# Patient Record
Sex: FEMALE | Race: WHITE | NOT HISPANIC OR LATINO | Employment: OTHER | URBAN - NONMETROPOLITAN AREA
[De-identification: names, ages, dates, MRNs, and addresses within clinical notes are randomized per-mention and may not be internally consistent; named-entity substitution may affect disease eponyms.]

---

## 2018-02-15 ENCOUNTER — OFFICE VISIT CONVERTED (OUTPATIENT)
Dept: FAMILY MEDICINE CLINIC | Facility: CLINIC | Age: 58
End: 2018-02-15
Attending: NURSE PRACTITIONER

## 2018-03-19 ENCOUNTER — OFFICE VISIT CONVERTED (OUTPATIENT)
Dept: ORTHOPEDIC SURGERY | Facility: CLINIC | Age: 58
End: 2018-03-19
Attending: ORTHOPAEDIC SURGERY

## 2018-04-02 ENCOUNTER — CONVERSION ENCOUNTER (OUTPATIENT)
Dept: GENERAL RADIOLOGY | Facility: HOSPITAL | Age: 58
End: 2018-04-02

## 2019-03-04 ENCOUNTER — HOSPITAL ENCOUNTER (OUTPATIENT)
Dept: ULTRASOUND IMAGING | Facility: HOSPITAL | Age: 59
Discharge: HOME | End: 2019-03-04
Attending: SURGERY

## 2019-03-11 ENCOUNTER — OFFICE VISIT CONVERTED (OUTPATIENT)
Dept: FAMILY MEDICINE CLINIC | Facility: CLINIC | Age: 59
End: 2019-03-11
Attending: FAMILY MEDICINE

## 2019-03-26 ENCOUNTER — OFFICE VISIT CONVERTED (OUTPATIENT)
Dept: FAMILY MEDICINE CLINIC | Facility: CLINIC | Age: 59
End: 2019-03-26
Attending: FAMILY MEDICINE

## 2019-03-26 ENCOUNTER — CONVERSION ENCOUNTER (OUTPATIENT)
Dept: FAMILY MEDICINE CLINIC | Facility: CLINIC | Age: 59
End: 2019-03-26

## 2019-04-15 ENCOUNTER — OFFICE VISIT CONVERTED (OUTPATIENT)
Dept: FAMILY MEDICINE CLINIC | Facility: CLINIC | Age: 59
End: 2019-04-15
Attending: FAMILY MEDICINE

## 2019-05-09 ENCOUNTER — CONVERSION ENCOUNTER (OUTPATIENT)
Dept: FAMILY MEDICINE CLINIC | Facility: CLINIC | Age: 59
End: 2019-05-09

## 2019-05-09 ENCOUNTER — OFFICE VISIT CONVERTED (OUTPATIENT)
Dept: FAMILY MEDICINE CLINIC | Facility: CLINIC | Age: 59
End: 2019-05-09
Attending: FAMILY MEDICINE

## 2019-06-04 ENCOUNTER — OFFICE VISIT CONVERTED (OUTPATIENT)
Dept: FAMILY MEDICINE CLINIC | Facility: CLINIC | Age: 59
End: 2019-06-04
Attending: FAMILY MEDICINE

## 2019-06-06 ENCOUNTER — HOSPITAL ENCOUNTER (OUTPATIENT)
Dept: URGENT CARE | Facility: CLINIC | Age: 59
Discharge: HOME | End: 2019-06-06
Attending: PHYSICIAN ASSISTANT

## 2019-06-10 ENCOUNTER — OFFICE VISIT CONVERTED (OUTPATIENT)
Dept: FAMILY MEDICINE CLINIC | Facility: CLINIC | Age: 59
End: 2019-06-10
Attending: FAMILY MEDICINE

## 2019-07-02 ENCOUNTER — OFFICE VISIT CONVERTED (OUTPATIENT)
Dept: FAMILY MEDICINE CLINIC | Facility: CLINIC | Age: 59
End: 2019-07-02
Attending: FAMILY MEDICINE

## 2019-08-05 ENCOUNTER — OFFICE VISIT CONVERTED (OUTPATIENT)
Dept: FAMILY MEDICINE CLINIC | Facility: CLINIC | Age: 59
End: 2019-08-05
Attending: FAMILY MEDICINE

## 2020-02-21 ENCOUNTER — OFFICE VISIT CONVERTED (OUTPATIENT)
Dept: FAMILY MEDICINE CLINIC | Facility: CLINIC | Age: 60
End: 2020-02-21
Attending: FAMILY MEDICINE

## 2020-04-23 ENCOUNTER — TELEMEDICINE CONVERTED (OUTPATIENT)
Dept: FAMILY MEDICINE CLINIC | Facility: CLINIC | Age: 60
End: 2020-04-23
Attending: FAMILY MEDICINE

## 2020-07-25 ENCOUNTER — HOSPITAL ENCOUNTER (OUTPATIENT)
Dept: URGENT CARE | Facility: CLINIC | Age: 60
Discharge: HOME | End: 2020-07-25

## 2020-07-27 LAB — BACTERIA UR CULT: NORMAL

## 2020-11-02 ENCOUNTER — TELEPHONE CONVERTED (OUTPATIENT)
Dept: FAMILY MEDICINE CLINIC | Facility: CLINIC | Age: 60
End: 2020-11-02
Attending: FAMILY MEDICINE

## 2020-11-20 ENCOUNTER — TELEPHONE CONVERTED (OUTPATIENT)
Dept: FAMILY MEDICINE CLINIC | Facility: CLINIC | Age: 60
End: 2020-11-20
Attending: FAMILY MEDICINE

## 2021-03-05 ENCOUNTER — OFFICE VISIT CONVERTED (OUTPATIENT)
Dept: FAMILY MEDICINE CLINIC | Facility: CLINIC | Age: 61
End: 2021-03-05
Attending: FAMILY MEDICINE

## 2021-05-12 NOTE — PROGRESS NOTES
Quick Note      Patient Name: Viviana Hodges   Patient ID: [id number]   Sex: Female   YOB: 1960    Primary Care Provider: Delfin Bhandari DO   Referring Provider: Delfin Bhandari DO    Visit Date: April 23, 2020    Provider: Delfin Bhandari DO   Location: [facility]   Location Address: [address] [place] [address] [address] [place] [address]  [id number]   Location Phone: [phone]          History Of Present Illness  Video Conferencing Visit  Viviana Hodges is a 60 year old /White female who is presenting for evaluation via video conferencing. Verbal consent obtained before beginning visit.   The following staff were present during this visit: Delfin Bhandari DO        Patient is seen via video telehealth for medication refills and discussion.  Was last seen on 2/2020, is on bupropion Zyrtec as well as Flexeril as needed preventive Bystolic irbesartan Vytorin.  Blood pressure last visit was 109/64 and relatively in that range with no dizziness or other.  She had lost 27 pounds or so as well and BMI was approaching 30 goal.  She had weight loss surgery and works for [employer] no concerns today and is needs refills.    Mainly is going to get her Wellbutrin refill because the cardiologist who started her on it many years ago has left and who she sees now would prefer her primary physician continue her on this treatment and manage her depression which is stable.  No chest pain palpitations SI or HI    Last labs were 2/2019 she had Cologuard ordered with no result, and labs were actually 4/2018 CBC normal A1c 5.5 CMP normal and cholesterol as well.    Mammogram performed 4/2018 BI-RADS 2, recommend repeating depression has been negative       Physical Examination  · Constitutional  o Appearance  o : no acute distress, well-nourished  · Head and Face  o Head  o :   § Inspection  § : atraumatic, normocephalic  · Eyes  o Eyes  o : extraocular movements intact, no scleral icterus, no  conjunctival injection  · Ears, Nose, Mouth and Throat  o Ears  o :   § External Ears  § : normal  o Nose  o :   § Intranasal Exam  § : nares patent  o Oral Cavity  o :   § Oral Mucosa  § : moist mucous membranes  · Respiratory  o Respiratory Effort  o : breathing comfortably, symmetric chest rise  · Neurologic  o Mental Status Examination  o :   § Orientation  § : grossly oriented to person, place and time  · Psychiatric  o General  o : normal mood and affect          Assessment  · HLD (hyperlipidemia)     272.4/E78.5  · HTN (hypertension)     401.9/I10  · MDD (major depressive disorder)     296.20/F32.9  · OA (osteoarthritis)     715.90/M19.90         MDD  *stable  renew and continue wellubutrin  send to [facility]  stable on same med since 1995     Hx stents, CAD  Obesity   HTN  *STABLE  continue w/ antiplatelets meds and BP  repeat labs this year w/ physical      f/u 6-12 moths for physical and review chronic conditions, order mammogram if agreeable, last 4/2018            Problems Reconciled  Plan  · Medications  o irbesartan 150 mg oral tablet   SIG: take 1 tablet (150 mg) by oral route once daily   DISP: (0) tablet with 0 refills  Discontinued on 04/23/2020     o Medications have been Reconciled  · Disposition  o Call or Return if symptoms worsen or persist.  o Labs at follow up            Electronically Signed by: Delfin Bhandari DO -Author on April 23, 2020 01:45:23 PM

## 2021-05-13 NOTE — PROGRESS NOTES
Progress Note      Patient Name: Viviana Hodges   Patient ID: [id number]   Sex: Female   YOB: 1960    Primary Care Provider: Delfin Bhandari DO   Referring Provider: Delfin Bhandari DO    Visit Date: November 2, 2020    Provider: Delfin Bhandari DO   Location: [facility]   Location Address: [address] [place] [address]  [id number]   Location Phone: [phone]          Chief Complaint  · sciatic pain      History Of Present Illness  TELEHEALTH TELEPHONE VISIT  Viviana Hodges is a 60 year old /White female who is presenting for evaluation via telehealth telephone visit. Verbal consent obtained before beginning visit.   Provider spent 8 minutes with patient during telehealth visit.   The following staff were present during this visit: Delfin Bhandari DO   Past Medical History/Overview of Patient Symptoms  Viviana Hodges is a 60 year old /White female who presents for evaluation and treatment of:        Patient calls complaining of sciatic pain is flared up happens every so often worse now than before.  Has had improvement with her condition when she taken tramadol before and given the severity of it she states will be okay to try, diclofenac and some of those inflammatory medicines hurts her stomach caused gastritis and has been intolerant of these in the past.  Denies any injury numbness tingling       Past Medical History  Disease Name Date Onset Notes   Bursitis of left shoulder 03/26/2018 --    CAD (coronary artery disease) 04/20/2015 --    Chronic allergic rhinitis --  --    DDD (degenerative disc disease), lumbar 02/22/2016 --    GERD without esophagitis 02/22/2016 --    History of coronary artery stent placement --  --    HLD (hyperlipidemia) --  --    HTN (hypertension) --  --    IBS (irritable bowel syndrome) 02/22/2016 --    MDD (major depressive disorder) --  --    OA (osteoarthritis) --  --    Obesity (BMI 30.0-34.9) --  --    Right nondisplaced  fracture of the proximal fibula 08/16/2017 --    Screening for colon cancer 2015 repeat 5-10 years no polyp   Screening Mammogram 2018 repeat 2020    Seasonal allergies --  --          Past Surgical History  Procedure Name Date Notes   cardiac stents --  --    Cesarian Section 1986 --    Colonoscopy --  --    Eye Implant --  yes   heart surgery --  --    Hysterectomy 2009 --    Lap Band Surgery 2008 --    Tonsillectomy --  --          Medication List  Name Date Started Instructions   aspirin 81 mg oral tablet,chewable  chew 1 tablet (81 mg) by oral route once daily   Avalide 300-12.5 mg oral tablet  take 1 tablet by oral route once daily   Brilinta 90 mg oral tablet  take 1 tablet (90 mg) by oral route 2 times per day for 90 days   bupropion HCl 300 mg oral tablet extended release 24 hr 04/23/2020 take 1 tablet (300 mg) by oral route once daily for 90 days   Bystolic 5 mg oral tablet  take 1 tablet (5 mg) by oral route once daily   cetirizine 10 mg oral tablet 05/04/2020 TAKE 1 TABLET DAILY   cyclobenzaprine 10 mg oral tablet 07/26/2020 TAKE 1 TABLET THREE TIMES A DAY FOR BACK PAIN   estradiol 0.1 mg/24 hr transdermal patch semiweekly 04/16/2020 --    ezetimibe-simvastatin 10-40 mg oral tablet 02/07/2020 --    irbesartan-hydrochlorothiazide 300-12.5 mg oral tablet 04/16/2020 --    nitroglycerin 2.5 mg oral capsule, extended release  take 1 capsule (2.5 mg) by oral route every 12 hours   prednisone 20 mg oral tablet 11/02/2020 take 2 tablets (40 mg) by oral route once daily for 7 days   tramadol 50 mg oral tablet 11/02/2020 take 1 tablet (50 mg) by oral route every 6 hours as needed for 3 days   Vytorin 10-40 10-40 mg oral tablet  take 1 tablet by oral route once daily         Allergy List  Allergen Name Date Reaction Notes   PENICILLINS --  --  --    Shell Fish (shrimp, crayfish, lobster, crab) --  --  --        Allergies Reconciled  Family Medical History  Disease Name Relative/Age Notes   Stroke Sister/   Sister    Heart Disease Father/  Mother/   Father; Mother  Father  grandparents   Diabetes, unspecified type Father/  Mother/   Mother; Father  Mother; Father  grandparents   Family history of certain chronic disabling diseases; arthritis Father/  Mother/   Mother; Father   Osteoporosis Mother/   Mother         Reproductive History  Menstrual   Certainty of LMP Date:    Pregnancy Summary   Total Pregnancies: 1 Full Term: 1 Premature: 0   Ab Induced: 0 Ab Spontaneous: 0 Ectopics: 0   Multiples: 0 Livin         Social History  Finding Status Start/Stop Quantity Notes   Active but no formal exercise --  --/-- --  --    Alcohol Use Current some day --/-- --  occasionally drinks, has been drinking for 31 or more years  occasionally drinks   Denies substance abuse --  --/-- --  --    lives with spouse --  --/-- --  --     --  --/-- --  --    Tobacco Former --/-- --  former smoker   Working --  --/-- --  --          Immunizations  NameDate Admin Mfg Trade Name Lot Number Route Inj VIS Given VIS Publication   Hepatitis [id number] NE HAVRIX-ADULT [id number] IM RD 2018 04/10/2017   Comments: NDC# [id number]. Patient tolerated the shot well. No reaction after a 20 min. wait.   Jsoijrioe87/21/2020 [facility] Fluzone Quadrivalent [id number] IM RD 2020    Comments: Patient tolerated injection well. NDC# 23963-457-43         Review of Systems  · Constitutional  o Denies  o : fever, fatigue, weight loss, weight gain  · HENT  o Denies  o : sinus pain, nasal congestion, nasal discharge, postnasal drip  · Cardiovascular  o Denies  o : lower extremity edema, claudication, chest pressure, palpitations  · Respiratory  o Denies  o : shortness of breath, wheezing, cough, hemoptysis, dyspnea on exertion  · Gastrointestinal  o Denies  o : nausea, vomiting, diarrhea, constipation, abdominal pain  · Musculoskeletal  o Admits  o : muscle pain  o Denies  o : joint pain, joint swelling          Assessment  · Sciatic  pain     724.3/M54.30         Leonardo reviewed  Tramadol and steroid to treat  If no improvement consider physical therapy and further evaluation  Precautions given       Plan  · Orders  o LEONARDO Report (KASPR) - 724.3/M54.30 - 11/02/2020  o ACO-39: Current medications updated and reviewed (1159F, ) - - 11/02/2020  o Physican Telephone evaluation, 5-10 min (24283) - 724.3/M54.30 - 11/02/2020  · Medications  o Medications have been Reconciled  · Instructions  o Chronic conditions reviewed and taken into consideration for today's treatment plan.  o Patient instructed to seek medical attention urgently for new or worsening symptoms.  o Trusted Web sites were provided.  o Bring all medicines with their bottles to each office visit.  o Electronically Identified Patient Education Materials Provided Electronically  · Disposition  o Call or Return if symptoms worsen or persist.            Electronically Signed by: Delfin Bhandari DO -Author on November 2, 2020 12:45:28 PM

## 2021-05-13 NOTE — PROGRESS NOTES
Progress Note      Patient Name: Viviana Hodges   Patient ID: [id number]   Sex: Female   YOB: 1960    Primary Care Provider: Delfin Bhandari DO   Referring Provider: Delfin Bhandari DO    Visit Date: November 20, 2020    Provider: Delfin Bhandari DO   Location: [facility]   Location Address: [address] [place] [address]  [id number]   Location Phone: [phone]          Chief Complaint  · not feeling well      History Of Present Illness  TELEHEALTH TELEPHONE VISIT  Viviana Hodges is a 60 year old /White female who is presenting for evaluation via telehealth telephone visit. Verbal consent obtained before beginning visit.   Provider spent 8 minutes with patient during telehealth visit.   The following staff were present during this visit: Delfin Bhandari DO   Past Medical History/Overview of Patient Symptoms  Viviana Hodges is a 60 year old /White female who presents for evaluation and treatment of:          Patient calls complaining of some runny nose and having symptoms yesterday of getting hot and feeling nauseated while out shopping, has maybe had some chest tightness this morning but overall feels better than yesterday as far as her sinus drainage goes.    No fever no change in smell or taste  has similar runny nose symptoms and sick contacts or anything along those lines.  Symptoms were yesterday no other risk factors recent testing or other    She has PMH significant for depression chronic pain CAD HTN HLD       Past Medical History  Disease Name Date Onset Notes   Bursitis of left shoulder 03/26/2018 --    CAD (coronary artery disease) 04/20/2015 --    Chronic allergic rhinitis --  --    DDD (degenerative disc disease), lumbar 02/22/2016 --    GERD without esophagitis 02/22/2016 --    History of coronary artery stent placement --  --    HLD (hyperlipidemia) --  --    HTN (hypertension) --  --    IBS (irritable bowel syndrome) 02/22/2016 --     MDD (major depressive disorder) --  --    OA (osteoarthritis) --  --    Obesity (BMI 30.0-34.9) --  --    Right nondisplaced fracture of the proximal fibula 08/16/2017 --    Screening for colon cancer 2015 repeat 5-10 years no polyp   Screening Mammogram 2018 repeat 2020    Seasonal allergies --  --          Past Surgical History  Procedure Name Date Notes   cardiac stents --  --    Cesarian Section 1986 --    Colonoscopy --  --    Eye Implant --  yes   heart surgery --  --    Hysterectomy 2009 --    Lap Band Surgery 2008 --    Tonsillectomy --  --          Medication List  Name Date Started Instructions   aspirin 81 mg oral tablet,chewable  chew 1 tablet (81 mg) by oral route once daily   Avalide 300-12.5 mg oral tablet  take 1 tablet by oral route once daily   Brilinta 90 mg oral tablet  take 1 tablet (90 mg) by oral route 2 times per day for 90 days   bupropion HCl 300 mg oral tablet extended release 24 hr 04/23/2020 take 1 tablet (300 mg) by oral route once daily for 90 days   Bystolic 5 mg oral tablet  take 1 tablet (5 mg) by oral route once daily   cetirizine 10 mg oral tablet 05/04/2020 TAKE 1 TABLET DAILY   cyclobenzaprine 10 mg oral tablet 07/26/2020 TAKE 1 TABLET THREE TIMES A DAY FOR BACK PAIN   estradiol 0.1 mg/24 hr transdermal patch semiweekly 04/16/2020 --    ezetimibe-simvastatin 10-40 mg oral tablet 02/07/2020 --    irbesartan-hydrochlorothiazide 300-12.5 mg oral tablet 04/16/2020 --    nitroglycerin 2.5 mg oral capsule, extended release  take 1 capsule (2.5 mg) by oral route every 12 hours   prednisone 20 mg oral tablet 11/02/2020 take 2 tablets (40 mg) by oral route once daily for 7 days   tramadol 50 mg oral tablet 11/02/2020 take 1 tablet (50 mg) by oral route every 6 hours as needed for 3 days   Vytorin 10-40 10-40 mg oral tablet  take 1 tablet by oral route once daily         Allergy List  Allergen Name Date Reaction Notes   PENICILLINS --  --  --    Shell Fish (shrimp, crayfish, lobster,  crab) --  --  --        Allergies Reconciled  Family Medical History  Disease Name Relative/Age Notes   Stroke Sister/   Sister   Heart Disease Father/  Mother/   Father; Mother  Father  grandparents   Diabetes, unspecified type Father/  Mother/   Mother; Father  Mother; Father  grandparents   Family history of certain chronic disabling diseases; arthritis Father/  Mother/   Mother; Father   Osteoporosis Mother/   Mother         Reproductive History  Menstrual   Certainty of LMP Date:    Pregnancy Summary   Total Pregnancies: 1 Full Term: 1 Premature: 0   Ab Induced: 0 Ab Spontaneous: 0 Ectopics: 0   Multiples: 0 Livin         Social History  Finding Status Start/Stop Quantity Notes   Active but no formal exercise --  --/-- --  --    Alcohol Use Current some day --/-- --  occasionally drinks, has been drinking for 31 or more years  occasionally drinks   Denies substance abuse --  --/-- --  --    lives with spouse --  --/-- --  --     --  --/-- --  --    Tobacco Former --/-- --  former smoker   Working --  --/-- --  --          Immunizations  NameDate Admin Mfg Trade Name Lot Number Route Inj VIS Given VIS Publication   Hepatitis [id number] NE HAVRIX-ADULT [id number] IM RD 2018 04/10/2017   Comments: NDC# [id number]. Patient tolerated the shot well. No reaction after a 20 min. wait.   Dgdmxclfj08/21/2020 [facility] Fluzone Quadrivalent [id number] IM RD 2020    Comments: Patient tolerated injection well. NDC# 37529-832-50         Review of Systems  · Constitutional  o Denies  o : fever, fatigue, weight loss, weight gain  · HENT  o Admits  o : nasal congestion, postnasal drip  · Cardiovascular  o Denies  o : lower extremity edema, claudication, chest pressure, palpitations  · Respiratory  o Denies  o : shortness of breath, wheezing, cough, hemoptysis, dyspnea on exertion  · Gastrointestinal  o Denies  o : nausea, vomiting, diarrhea, constipation, abdominal pain  · Integument  o Denies  o : rash,  itching  · Psychiatric  o Denies  o : anxiety, depression          Assessment  · Allergic rhinitis     477.9/J30.9  · URI (upper respiratory infection)     465.9/J06.9         Reviewed symptoms and history of illness with patient and overall low risk as patient states she wears her mask where she goes washes her hands and does distances from others has not been around anybody new no sick contacts  has similar symptoms no loss of smell or taste fatigue fever    Symptoms started yesterday and was hot nauseated when shopping    Advised to have URI or cold we will treat with over-the-counter medicine still maintain distancing factors wash hands wear mask and stay away from family members until well or better if things change or develop such as worsening shortness of breath fatigue or other would recommend coming in and testing       Plan  · Orders  o ACO-39: Current medications updated and reviewed (, 1159F) - 477.9/J30.9, 465.9/J06.9 - 11/20/2020  o Physican Telephone evaluation, 5-10 min (87560) - 477.9/J30.9, 465.9/J06.9 - 11/20/2020  · Medications  o Medications have been Reconciled  · Instructions  o Chronic conditions reviewed and taken into consideration for today's treatment plan.  o Patient instructed to seek medical attention urgently for new or worsening symptoms.  o Trusted Web sites were provided.  o Bring all medicines with their bottles to each office visit.  o Electronically Identified Patient Education Materials Provided Electronically  · Disposition  o Call or Return if symptoms worsen or persist.  o as scheduled            Electronically Signed by: Delfin Bhandari, DO -Author on November 20, 2020 11:36:45 AM

## 2021-05-14 VITALS
SYSTOLIC BLOOD PRESSURE: 110 MMHG | OXYGEN SATURATION: 96 % | HEIGHT: 67 IN | RESPIRATION RATE: 18 BRPM | DIASTOLIC BLOOD PRESSURE: 68 MMHG | TEMPERATURE: 96.2 F | WEIGHT: 198.5 LBS | HEART RATE: 65 BPM | BODY MASS INDEX: 31.16 KG/M2

## 2021-05-14 NOTE — PROGRESS NOTES
Progress Note      Patient Name: Viviana Hodges   Patient ID: [id number]   Sex: Female   YOB: 1960    Primary Care Provider: Delfin Bhandari DO   Referring Provider: Delfin Bhandari DO    Visit Date: March 5, 2021    Provider: Delfin Bhandari DO   Location: [facility]   Location Address: [address] [place] [address]  [id number]   Location Phone: [phone]          Chief Complaint  · Discuss FMLA paperwork   · Requesting rx of Flonase, Cetirizine, and Cyclobenzaprine to [facility].       History Of Present Illness  Viviana Hodges is a 61 year old /White female who presents for evaluation and treatment of:        Patient presents with her  complaining about needing a renewal of her work has been a significant for allergies chronic pain arthritis.  She gets FMLA for her flareups of arthritis that she says can be so bad she can barely get to work due to her long drive.  She takes some medicines for relief it seems to flareup sometimes whether she does everything right or not and unpredicable       Past Medical History  Disease Name Date Onset Notes   Bursitis of left shoulder 03/26/2018 --    CAD (coronary artery disease) 04/20/2015 --    Chronic allergic rhinitis --  --    DDD (degenerative disc disease), lumbar 02/22/2016 --    GERD without esophagitis 02/22/2016 --    History of coronary artery stent placement --  --    HLD (hyperlipidemia) --  --    HTN (hypertension) --  --    IBS (irritable bowel syndrome) 02/22/2016 --    MDD (major depressive disorder) --  --    OA (osteoarthritis) --  --    Obesity (BMI 30.0-34.9) --  --    Right nondisplaced fracture of the proximal fibula 08/16/2017 --    Screening for colon cancer 2015 repeat 5-10 years no polyp   Screening Mammogram 2018 repeat 2020    Seasonal allergies --  --          Past Surgical History  Procedure Name Date Notes   cardiac stents --  --    Cesarian Section 1986 --    Colonoscopy --   --    Eye Implant --  yes   heart surgery --  --    Hysterectomy 2009 --    Lap Band Surgery 2008 --    Tonsillectomy --  --          Medication List  Name Date Started Instructions   aspirin 81 mg oral tablet,chewable  chew 1 tablet (81 mg) by oral route once daily   Avalide 300-12.5 mg oral tablet  take 1 tablet by oral route once daily   Brilinta 90 mg oral tablet  take 1 tablet (90 mg) by oral route 2 times per day for 90 days   bupropion HCl 300 mg oral tablet extended release 24 hr 04/23/2020 take 1 tablet (300 mg) by oral route once daily for 90 days   Bystolic 5 mg oral tablet  take 1 tablet (5 mg) by oral route once daily   cetirizine 10 mg oral tablet 03/05/2021 TAKE 1 TABLET DAILY   cyclobenzaprine 10 mg oral tablet 03/05/2021 TAKE 1 TABLET THREE TIMES A DAY FOR BACK PAIN   estradiol 0.1 mg/24 hr transdermal patch semiweekly 04/16/2020 --    ezetimibe-simvastatin 10-40 mg oral tablet 02/07/2020 --    Flonase Allergy Relief 50 mcg/actuation nasal spray,suspension 03/05/2021 spray 2 sprays (100 mcg) in each nostril by intranasal route once daily as needed   irbesartan-hydrochlorothiazide 300-12.5 mg oral tablet 04/16/2020 --    nitroglycerin 2.5 mg oral capsule, extended release  take 1 capsule (2.5 mg) by oral route every 12 hours   Vytorin 10-40 10-40 mg oral tablet  take 1 tablet by oral route once daily         Allergy List  Allergen Name Date Reaction Notes   PENICILLINS --  --  --    Shell Fish (shrimp, crayfish, lobster, crab) --  --  --          Family Medical History  Disease Name Relative/Age Notes   Stroke Sister/   Sister   Heart Disease Father/  Mother/   Father; Mother  Father  grandparents   Diabetes, unspecified type Father/  Mother/   Mother; Father  Mother; Father  grandparents   Family history of certain chronic disabling diseases; arthritis Father/  Mother/   Mother; Father   Osteoporosis Mother/   Mother         Reproductive History  Menstrual   Certainty of LMP Date: 2009  "  Pregnancy Summary   Total Pregnancies: 1 Full Term: 1 Premature: 0   Ab Induced: 0 Ab Spontaneous: 0 Ectopics: 0   Multiples: 0 Livin         Social History  Finding Status Start/Stop Quantity Notes   Active but no formal exercise --  --/-- --  --    Alcohol Use Current some day --/-- --  occasionally drinks, has been drinking for 31 or more years  occasionally drinks   Denies substance abuse --  --/-- --  --    lives with spouse --  --/-- --  --     --  --/-- --  --    Tobacco Former --/-- --  former smoker   Working --  --/-- --  --          Immunizations  NameDate Admin Mfg Trade Name Lot Number Route Inj VIS Given VIS Publication   Hepatitis [id number] NE HAVRIX-ADULT [id number] IM RD 2018 04/10/2017   Comments: NDC# [id number]. Patient tolerated the shot well. No reaction after a 20 min. wait.   Aytkdeyqk96/21/2020 [facility] Fluzone Quadrivalent [id number] IM RD 2020    Comments: Patient tolerated injection well. NDC# 40217-494-08         Review of Systems  · Constitutional  o Denies  o : fever, fatigue, weight loss, weight gain  · HENT  o Denies  o : sinus pain, nasal congestion, nasal discharge, postnasal drip  · Cardiovascular  o Denies  o : lower extremity edema, claudication, chest pressure, palpitations  · Respiratory  o Denies  o : shortness of breath, wheezing, cough, hemoptysis, dyspnea on exertion  · Gastrointestinal  o Denies  o : nausea, vomiting, diarrhea, constipation, abdominal pain  · Integument  o Denies  o : rash, itching  · Musculoskeletal  o Denies  o : joint pain, joint swelling  · Psychiatric  o Denies  o : anxiety, depression      Vitals  Date Time BP Position Site L\R Cuff Size HR RR TEMP (F) WT  HT  BMI kg/m2 BSA m2 O2 Sat FR L/min FiO2        2021 01:55 /68 Sitting    65 - R 18 96.2 198lbs 8oz 5'  7\" 31.09 2.06 96 %  21%          Physical Examination  · Constitutional  o Appearance  o : no acute distress, well-nourished  · Head and Face  o Head  o : "   § Inspection  § : atraumatic, normocephalic  · Ears, Nose, Mouth and Throat  o Ears  o :   § External Ears  § : normal  o Nose  o :   § Intranasal Exam  § : nares patent  o Oral Cavity  o :   § Oral Mucosa  § : moist mucous membranes  · Respiratory  o Respiratory Effort  o : breathing comfortably, symmetric chest rise  o Auscultation of Lungs  o : clear to asculatation bilaterally, no wheezes, rales, or rhonchii  · Cardiovascular  o Heart  o :   § Auscultation of Heart  § : regular rate and rhythm, no murmurs, rubs, or gallops  o Peripheral Vascular System  o :   § Extremities  § : no edema  · Neurologic  o Mental Status Examination  o :   § Orientation  § : grossly oriented to person, place and time  o Gait and Station  o :   § Gait Screening  § : normal gait  · Psychiatric  o General  o : normal mood and affect          Assessment  · CAD (coronary artery disease)     414.00/I25.10  · DDD (degenerative disc disease), lumbar     722.52/M51.36  · GERD without esophagitis     530.81  · History of coronary artery stent placement     V45.82/Z95.5  · HLD (hyperlipidemia)     272.4/E78.5  · HTN (hypertension)     401.9/I10         History of CAD GERD with a stent placement HLD and HTN  *Stable  Blood pressure today well controlled 110/68 continue with medicines as prescribed Bystolic irbesartan HCTZ Brilinta    Osteoarthritis  Can continue to take Flexeril as needed and then any anti-inflammatories as appropriate for flareups, renew them fill out her paperwork at next today for relief as had trouble with this last year    Recommend annual wellness visit at least once a year or a preventative health to review if she needs a low-dose CT scan done mammogram or colonoscopy     Problems Reconciled  Plan  · Orders  o Physical, Primary Care Panel (CBC, CMP, Lipid, TSH) [facility] (31406, 95526, 18012, 75095) - V45.82/Z95.5, 530.81, 401.9/I10, 272.4/E78.5 - 03/05/2021 - - (Standing Order 1 of 1 - occurring every 0 days)  o ACO-39:  Current medications updated and reviewed (, 1159F) - 722.52/M51.36, V45.82/Z95.5, 530.81, 401.9/I10 - 03/05/2021  · Medications  o Medications have been Reconciled  o Transition of Care or Provider Policy  · Instructions  o Patient was educated/instructed on their diagnosis, treatment and medications prior to discharge from the clinic today.  o Patient instructed to seek medical attention urgently for new or worsening symptoms.  o Trusted Web sites were provided.  o Call the office with any concerns or questions.  o Bring all medicines with their bottles to each office visit.  o Risks, benefits, and alternatives were discussed with the patient. The patient is aware of risks associated with:  o Chronic conditions reviewed and taken into consideration for today's treatment plan.  · Disposition  o Call or Return if symptoms worsen or persist.  o Labs before follow up ordered  o Reviewed chart labs and imaging prior to and during encounter, updated  o Return Visit Request in/on 6 months +/- 7 days ([id number]).            Electronically Signed by: Delfin Bhandari DO -Author on March 14, 2021 04:25:34 PM

## 2021-05-15 VITALS
RESPIRATION RATE: 20 BRPM | WEIGHT: 204.5 LBS | DIASTOLIC BLOOD PRESSURE: 64 MMHG | HEART RATE: 66 BPM | SYSTOLIC BLOOD PRESSURE: 109 MMHG | BODY MASS INDEX: 32.1 KG/M2 | TEMPERATURE: 97 F | HEIGHT: 67 IN | OXYGEN SATURATION: 97 %

## 2021-05-15 VITALS
WEIGHT: 233.31 LBS | BODY MASS INDEX: 36.62 KG/M2 | TEMPERATURE: 98.7 F | DIASTOLIC BLOOD PRESSURE: 77 MMHG | SYSTOLIC BLOOD PRESSURE: 125 MMHG | HEIGHT: 67 IN | RESPIRATION RATE: 20 BRPM | HEART RATE: 80 BPM | OXYGEN SATURATION: 98 %

## 2021-05-15 VITALS
HEART RATE: 74 BPM | HEIGHT: 67 IN | OXYGEN SATURATION: 98 % | TEMPERATURE: 98 F | RESPIRATION RATE: 20 BRPM | BODY MASS INDEX: 36.31 KG/M2 | SYSTOLIC BLOOD PRESSURE: 131 MMHG | DIASTOLIC BLOOD PRESSURE: 81 MMHG | WEIGHT: 231.37 LBS

## 2021-05-15 VITALS
HEART RATE: 80 BPM | SYSTOLIC BLOOD PRESSURE: 125 MMHG | HEIGHT: 67 IN | OXYGEN SATURATION: 98 % | TEMPERATURE: 98.3 F | BODY MASS INDEX: 36.44 KG/M2 | DIASTOLIC BLOOD PRESSURE: 77 MMHG | WEIGHT: 232.19 LBS | RESPIRATION RATE: 20 BRPM

## 2021-05-15 VITALS
RESPIRATION RATE: 20 BRPM | OXYGEN SATURATION: 97 % | HEART RATE: 64 BPM | DIASTOLIC BLOOD PRESSURE: 77 MMHG | BODY MASS INDEX: 36.73 KG/M2 | SYSTOLIC BLOOD PRESSURE: 146 MMHG | HEIGHT: 67 IN | TEMPERATURE: 97.5 F | WEIGHT: 234 LBS

## 2021-05-15 VITALS
HEART RATE: 74 BPM | RESPIRATION RATE: 20 BRPM | DIASTOLIC BLOOD PRESSURE: 84 MMHG | TEMPERATURE: 97.8 F | HEIGHT: 67 IN | OXYGEN SATURATION: 98 % | SYSTOLIC BLOOD PRESSURE: 126 MMHG | WEIGHT: 237.12 LBS | BODY MASS INDEX: 37.22 KG/M2

## 2021-05-15 VITALS
BODY MASS INDEX: 35.24 KG/M2 | HEART RATE: 76 BPM | WEIGHT: 232.5 LBS | TEMPERATURE: 98 F | HEIGHT: 68 IN | OXYGEN SATURATION: 98 % | SYSTOLIC BLOOD PRESSURE: 119 MMHG | RESPIRATION RATE: 20 BRPM | DIASTOLIC BLOOD PRESSURE: 76 MMHG

## 2021-05-15 VITALS
WEIGHT: 236.25 LBS | SYSTOLIC BLOOD PRESSURE: 142 MMHG | TEMPERATURE: 98 F | DIASTOLIC BLOOD PRESSURE: 81 MMHG | HEIGHT: 67 IN | BODY MASS INDEX: 37.08 KG/M2 | RESPIRATION RATE: 20 BRPM | HEART RATE: 80 BPM | OXYGEN SATURATION: 98 %

## 2021-05-15 VITALS
WEIGHT: 234.12 LBS | HEIGHT: 67 IN | DIASTOLIC BLOOD PRESSURE: 90 MMHG | TEMPERATURE: 97.6 F | RESPIRATION RATE: 20 BRPM | OXYGEN SATURATION: 97 % | HEART RATE: 66 BPM | BODY MASS INDEX: 36.74 KG/M2 | SYSTOLIC BLOOD PRESSURE: 138 MMHG

## 2021-05-16 VITALS
HEIGHT: 68 IN | SYSTOLIC BLOOD PRESSURE: 136 MMHG | DIASTOLIC BLOOD PRESSURE: 79 MMHG | WEIGHT: 219 LBS | BODY MASS INDEX: 33.19 KG/M2 | TEMPERATURE: 97.3 F | HEART RATE: 77 BPM | OXYGEN SATURATION: 97 % | RESPIRATION RATE: 20 BRPM

## 2021-05-16 VITALS — WEIGHT: 216.12 LBS | OXYGEN SATURATION: 98 % | HEART RATE: 67 BPM | BODY MASS INDEX: 32.75 KG/M2 | HEIGHT: 68 IN

## 2021-07-31 PROCEDURE — U0003 INFECTIOUS AGENT DETECTION BY NUCLEIC ACID (DNA OR RNA); SEVERE ACUTE RESPIRATORY SYNDROME CORONAVIRUS 2 (SARS-COV-2) (CORONAVIRUS DISEASE [COVID-19]), AMPLIFIED PROBE TECHNIQUE, MAKING USE OF HIGH THROUGHPUT TECHNOLOGIES AS DESCRIBED BY CMS-2020-01-R: HCPCS | Performed by: FAMILY MEDICINE

## 2021-08-01 ENCOUNTER — TELEPHONE (OUTPATIENT)
Dept: URGENT CARE | Facility: CLINIC | Age: 61
End: 2021-08-01

## 2021-08-01 NOTE — TELEPHONE ENCOUNTER
Left VM for patient to call back for result.  Patient has Positive Covid.  Please have provider speak with her when she calls back to answer any any questions.

## 2021-08-01 NOTE — TELEPHONE ENCOUNTER
Reviewed positive Covid result and recommended quarantine length and monitoring for Covid symptoms.

## 2021-08-03 ENCOUNTER — TELEPHONE (OUTPATIENT)
Dept: FAMILY MEDICINE CLINIC | Facility: CLINIC | Age: 61
End: 2021-08-03

## 2021-08-03 NOTE — TELEPHONE ENCOUNTER
Caller: Viviana Hodges    Relationship: Self    Best call back number: [phone]    What form or medical record are you requesting: COPY OF COVID TEST    Who is requesting this form or medical record from you: [employer]     How would you like to receive the form or medical records (pick-up, mail, fax): MAIL  If mail, what is the address: [address] [place] [id number]      Timeframe paperwork needed: AS SOON AS POSSIBLE

## 2021-08-05 ENCOUNTER — TELEPHONE (OUTPATIENT)
Dept: FAMILY MEDICINE CLINIC | Facility: CLINIC | Age: 61
End: 2021-08-05

## 2021-08-05 NOTE — TELEPHONE ENCOUNTER
LM FOR PT INFORMING WE DID NOT PERFORM TEST URGENT CARE DID THEREFORE THEY WOULD NEED TO RELEASE RESULTS.

## 2021-08-05 NOTE — TELEPHONE ENCOUNTER
Caller: Viviana Hodges    Relationship: Self    Best call back number: [phone]     What was the call regarding: PATIENT WAS RETURNING MISSED CALL TO [facility]. WARM TRANSFER TO THE OFFICE FAILED.     Do you require a callback: YES

## 2021-08-12 ENCOUNTER — OFFICE VISIT (OUTPATIENT)
Dept: FAMILY MEDICINE CLINIC | Facility: CLINIC | Age: 61
End: 2021-08-12

## 2021-08-12 DIAGNOSIS — U07.1 PNEUMONIA DUE TO COVID-19 VIRUS: ICD-10-CM

## 2021-08-12 DIAGNOSIS — U07.1 COVID-19 VIRUS INFECTION: Primary | ICD-10-CM

## 2021-08-12 DIAGNOSIS — J12.82 PNEUMONIA DUE TO COVID-19 VIRUS: ICD-10-CM

## 2021-08-12 PROCEDURE — 99213 OFFICE O/P EST LOW 20 MIN: CPT | Performed by: FAMILY MEDICINE

## 2021-08-12 RX ORDER — DEXAMETHASONE 4 MG/1
4 TABLET ORAL 2 TIMES DAILY WITH MEALS
Qty: 14 TABLET | Refills: 0 | Status: SHIPPED | OUTPATIENT
Start: 2021-08-12 | End: 2022-02-28

## 2021-08-12 RX ORDER — ISOSORBIDE MONONITRATE 30 MG/1
30 TABLET, EXTENDED RELEASE ORAL DAILY
COMMUNITY
Start: 2021-08-09

## 2021-08-12 RX ORDER — AZITHROMYCIN 250 MG/1
TABLET, FILM COATED ORAL
Qty: 6 TABLET | Refills: 0 | Status: SHIPPED | OUTPATIENT
Start: 2021-08-12 | End: 2022-02-28

## 2021-08-12 NOTE — PROGRESS NOTES
Chief Complaint  No chief complaint on file.    Subjective          Viviana Hodges presents to [facility]  History of Present Illness     Patient presents complaining of shortness of breath chest congestion cough has been going on in the last few days diagnosed with Covid on 7/30, no fever    Objective   Vital Signs:   There were no vitals taken for this visit.    Physical Exam  Vitals reviewed.   Constitutional:       Appearance: Normal appearance. She is well-developed.   HENT:      Head: Normocephalic and atraumatic.      Right Ear: External ear normal.      Left Ear: External ear normal.      Mouth/Throat:      Pharynx: No oropharyngeal exudate.   Eyes:      Conjunctiva/sclera: Conjunctivae normal.      Pupils: Pupils are equal, round, and reactive to light.   Cardiovascular:      Rate and Rhythm: Normal rate and regular rhythm.      Heart sounds: No murmur heard.   No friction rub. No gallop.    Pulmonary:      Effort: Pulmonary effort is normal.      Breath sounds: Normal breath sounds. No wheezing or rhonchi.   Abdominal:      General: Bowel sounds are normal. There is no distension.      Palpations: Abdomen is soft.      Tenderness: There is no abdominal tenderness.   Skin:     General: Skin is warm and dry.   Neurological:      Mental Status: She is alert and oriented to person, place, and time.      Cranial Nerves: No cranial nerve deficit.   Psychiatric:         Mood and Affect: Mood and affect normal.         Behavior: Behavior normal.         Thought Content: Thought content normal.         Judgment: Judgment normal.        Result Review :   The following data was reviewed by: Delfin Bhandari DO on 08/12/2021:                  Assessment and Plan    Diagnoses and all orders for this visit:    1. COVID-19 virus infection (Primary)  -     dexamethasone (DECADRON) 4 MG tablet; Take 1 tablet by mouth 2 (Two) Times a Day With Meals.  Dispense: 14 tablet; Refill: 0  -      azithromycin (Zithromax Z-Randall) 250 MG tablet; Take 2 tablets by mouth on day 1, then 1 tablet daily on days 2-5  Dispense: 6 tablet; Refill: 0    2. Pneumonia due to COVID-19 virus  -     dexamethasone (DECADRON) 4 MG tablet; Take 1 tablet by mouth 2 (Two) Times a Day With Meals.  Dispense: 14 tablet; Refill: 0  -     azithromycin (Zithromax Z-Randall) 250 MG tablet; Take 2 tablets by mouth on day 1, then 1 tablet daily on days 2-5  Dispense: 6 tablet; Refill: 0    We will treat above dexamethasone azithromycin follow-up with no improvement precautions given monitor oxygen at home        Follow Up   Return if symptoms worsen or fail to improve.  Patient was given instructions and counseling regarding her condition or for health maintenance advice. Please see specific information pulled into the AVS if appropriate.       Answers for HPI/ROS submitted by the patient on 8/12/2021  What is the primary reason for your visit?: Cough

## 2021-11-10 RX ORDER — CYCLOBENZAPRINE HCL 10 MG
TABLET ORAL
Qty: 270 TABLET | Refills: 3 | Status: CANCELLED | OUTPATIENT
Start: 2021-11-10

## 2021-11-11 RX ORDER — CYCLOBENZAPRINE HCL 10 MG
TABLET ORAL
Qty: 270 TABLET | Refills: 3 | Status: SHIPPED | OUTPATIENT
Start: 2021-11-11 | End: 2022-03-18

## 2022-01-03 RX ORDER — CETIRIZINE HYDROCHLORIDE 10 MG/1
TABLET ORAL
Qty: 90 TABLET | Refills: 3 | Status: SHIPPED | OUTPATIENT
Start: 2022-01-03

## 2022-02-14 RX ORDER — FLUTICASONE PROPIONATE 50 MCG
SPRAY, SUSPENSION (ML) NASAL
Qty: 48 G | Refills: 3 | Status: SHIPPED | OUTPATIENT
Start: 2022-02-14 | End: 2022-03-18

## 2022-02-25 RX ORDER — BUPROPION HYDROCHLORIDE 300 MG/1
TABLET ORAL
Qty: 90 TABLET | Refills: 3 | Status: SHIPPED | OUTPATIENT
Start: 2022-02-25 | End: 2022-12-21

## 2022-02-28 ENCOUNTER — HOSPITAL ENCOUNTER (INPATIENT)
Facility: HOSPITAL | Age: 62
LOS: 3 days | Discharge: HOME | End: 2022-03-03
Attending: EMERGENCY MEDICINE | Admitting: INTERNAL MEDICINE

## 2022-02-28 ENCOUNTER — APPOINTMENT (OUTPATIENT)
Dept: CT IMAGING | Facility: HOSPITAL | Age: 62
End: 2022-02-28

## 2022-02-28 DIAGNOSIS — K92.2 GASTROINTESTINAL HEMORRHAGE, UNSPECIFIED GASTROINTESTINAL HEMORRHAGE TYPE: Primary | ICD-10-CM

## 2022-02-28 LAB
ABO GROUP BLD: NORMAL
ABO GROUP BLD: NORMAL
ALBUMIN SERPL-MCNC: 3.7 G/DL (ref 3.5–5.2)
ALBUMIN/GLOB SERPL: 2.2 G/DL
ALP SERPL-CCNC: 63 U/L (ref 39–117)
ALT SERPL W P-5'-P-CCNC: 22 U/L (ref 1–33)
ANION GAP SERPL CALCULATED.3IONS-SCNC: 9.1 MMOL/L (ref 5–15)
AST SERPL-CCNC: 24 U/L (ref 1–32)
BASOPHILS # BLD AUTO: 0.03 10*3/MM3 (ref 0–0.2)
BASOPHILS # BLD AUTO: 0.04 10*3/MM3 (ref 0–0.2)
BASOPHILS # BLD AUTO: 0.04 10*3/MM3 (ref 0–0.2)
BASOPHILS NFR BLD AUTO: 0.2 % (ref 0–1.5)
BASOPHILS NFR BLD AUTO: 0.3 % (ref 0–1.5)
BASOPHILS NFR BLD AUTO: 0.4 % (ref 0–1.5)
BILIRUB SERPL-MCNC: 0.2 MG/DL (ref 0–1.2)
BILIRUB UR QL STRIP: NEGATIVE
BLD GP AB SCN SERPL QL: NEGATIVE
BUN SERPL-MCNC: 27 MG/DL (ref 8–23)
BUN/CREAT SERPL: 40.3 (ref 7–25)
CALCIUM SPEC-SCNC: 8.2 MG/DL (ref 8.6–10.5)
CHLORIDE SERPL-SCNC: 106 MMOL/L (ref 98–107)
CLARITY UR: CLEAR
CO2 SERPL-SCNC: 23.9 MMOL/L (ref 22–29)
COLOR UR: YELLOW
CREAT SERPL-MCNC: 0.67 MG/DL (ref 0.57–1)
DEPRECATED RDW RBC AUTO: 43.9 FL (ref 37–54)
DEPRECATED RDW RBC AUTO: 44.2 FL (ref 37–54)
DEPRECATED RDW RBC AUTO: 44.7 FL (ref 37–54)
EGFRCR SERPLBLD CKD-EPI 2021: 100.2 ML/MIN/1.73
EOSINOPHIL # BLD AUTO: 0.02 10*3/MM3 (ref 0–0.4)
EOSINOPHIL # BLD AUTO: 0.03 10*3/MM3 (ref 0–0.4)
EOSINOPHIL # BLD AUTO: 0.15 10*3/MM3 (ref 0–0.4)
EOSINOPHIL NFR BLD AUTO: 0.2 % (ref 0.3–6.2)
EOSINOPHIL NFR BLD AUTO: 0.3 % (ref 0.3–6.2)
EOSINOPHIL NFR BLD AUTO: 1.6 % (ref 0.3–6.2)
ERYTHROCYTE [DISTWIDTH] IN BLOOD BY AUTOMATED COUNT: 12.8 % (ref 12.3–15.4)
ERYTHROCYTE [DISTWIDTH] IN BLOOD BY AUTOMATED COUNT: 12.9 % (ref 12.3–15.4)
ERYTHROCYTE [DISTWIDTH] IN BLOOD BY AUTOMATED COUNT: 13.2 % (ref 12.3–15.4)
GLOBULIN UR ELPH-MCNC: 1.7 GM/DL
GLUCOSE SERPL-MCNC: 137 MG/DL (ref 65–99)
GLUCOSE UR STRIP-MCNC: NEGATIVE MG/DL
HCT VFR BLD AUTO: 19.2 % (ref 34–46.6)
HCT VFR BLD AUTO: 20.6 % (ref 34–46.6)
HCT VFR BLD AUTO: 23.1 % (ref 34–46.6)
HEMOCCULT STL QL IA: POSITIVE
HGB BLD-MCNC: 6.3 G/DL (ref 12–15.9)
HGB BLD-MCNC: 6.7 G/DL (ref 12–15.9)
HGB BLD-MCNC: 7.5 G/DL (ref 12–15.9)
HGB UR QL STRIP.AUTO: NEGATIVE
HOLD SPECIMEN: NORMAL
HOLD SPECIMEN: NORMAL
IMM GRANULOCYTES # BLD AUTO: 0.04 10*3/MM3 (ref 0–0.05)
IMM GRANULOCYTES # BLD AUTO: 0.05 10*3/MM3 (ref 0–0.05)
IMM GRANULOCYTES # BLD AUTO: 0.05 10*3/MM3 (ref 0–0.05)
IMM GRANULOCYTES NFR BLD AUTO: 0.3 % (ref 0–0.5)
IMM GRANULOCYTES NFR BLD AUTO: 0.4 % (ref 0–0.5)
IMM GRANULOCYTES NFR BLD AUTO: 0.5 % (ref 0–0.5)
KETONES UR QL STRIP: NEGATIVE
LEUKOCYTE ESTERASE UR QL STRIP.AUTO: NEGATIVE
LYMPHOCYTES # BLD AUTO: 1.93 10*3/MM3 (ref 0.7–3.1)
LYMPHOCYTES # BLD AUTO: 2.84 10*3/MM3 (ref 0.7–3.1)
LYMPHOCYTES # BLD AUTO: 3.15 10*3/MM3 (ref 0.7–3.1)
LYMPHOCYTES NFR BLD AUTO: 15.4 % (ref 19.6–45.3)
LYMPHOCYTES NFR BLD AUTO: 27.2 % (ref 19.6–45.3)
LYMPHOCYTES NFR BLD AUTO: 30.9 % (ref 19.6–45.3)
MCH RBC QN AUTO: 30.6 PG (ref 26.6–33)
MCH RBC QN AUTO: 30.7 PG (ref 26.6–33)
MCH RBC QN AUTO: 31 PG (ref 26.6–33)
MCHC RBC AUTO-ENTMCNC: 32.5 G/DL (ref 31.5–35.7)
MCHC RBC AUTO-ENTMCNC: 32.5 G/DL (ref 31.5–35.7)
MCHC RBC AUTO-ENTMCNC: 32.8 G/DL (ref 31.5–35.7)
MCV RBC AUTO: 94.1 FL (ref 79–97)
MCV RBC AUTO: 94.6 FL (ref 79–97)
MCV RBC AUTO: 94.7 FL (ref 79–97)
MONOCYTES # BLD AUTO: 0.43 10*3/MM3 (ref 0.1–0.9)
MONOCYTES # BLD AUTO: 0.56 10*3/MM3 (ref 0.1–0.9)
MONOCYTES # BLD AUTO: 0.99 10*3/MM3 (ref 0.1–0.9)
MONOCYTES NFR BLD AUTO: 3.4 % (ref 5–12)
MONOCYTES NFR BLD AUTO: 6.1 % (ref 5–12)
MONOCYTES NFR BLD AUTO: 8.6 % (ref 5–12)
NEUTROPHILS NFR BLD AUTO: 10.05 10*3/MM3 (ref 1.7–7)
NEUTROPHILS NFR BLD AUTO: 5.54 10*3/MM3 (ref 1.7–7)
NEUTROPHILS NFR BLD AUTO: 60.5 % (ref 42.7–76)
NEUTROPHILS NFR BLD AUTO: 63.2 % (ref 42.7–76)
NEUTROPHILS NFR BLD AUTO: 7.31 10*3/MM3 (ref 1.7–7)
NEUTROPHILS NFR BLD AUTO: 80.5 % (ref 42.7–76)
NITRITE UR QL STRIP: NEGATIVE
NRBC BLD AUTO-RTO: 0 /100 WBC (ref 0–0.2)
PH UR STRIP.AUTO: 6.5 [PH] (ref 5–8)
PLATELET # BLD AUTO: 238 10*3/MM3 (ref 140–450)
PLATELET # BLD AUTO: 240 10*3/MM3 (ref 140–450)
PLATELET # BLD AUTO: 267 10*3/MM3 (ref 140–450)
PMV BLD AUTO: 10.2 FL (ref 6–12)
PMV BLD AUTO: 10.3 FL (ref 6–12)
PMV BLD AUTO: 10.6 FL (ref 6–12)
POTASSIUM SERPL-SCNC: 4.3 MMOL/L (ref 3.5–5.2)
PROT SERPL-MCNC: 5.4 G/DL (ref 6–8.5)
PROT UR QL STRIP: NEGATIVE
RBC # BLD AUTO: 2.03 10*6/MM3 (ref 3.77–5.28)
RBC # BLD AUTO: 2.19 10*6/MM3 (ref 3.77–5.28)
RBC # BLD AUTO: 2.44 10*6/MM3 (ref 3.77–5.28)
RH BLD: POSITIVE
RH BLD: POSITIVE
SODIUM SERPL-SCNC: 139 MMOL/L (ref 136–145)
SP GR UR STRIP: >1.03 (ref 1–1.03)
T&S EXPIRATION DATE: NORMAL
UROBILINOGEN UR QL STRIP: ABNORMAL
WBC NRBC COR # BLD: 11.57 10*3/MM3 (ref 3.4–10.8)
WBC NRBC COR # BLD: 12.5 10*3/MM3 (ref 3.4–10.8)
WBC NRBC COR # BLD: 9.18 10*3/MM3 (ref 3.4–10.8)
WHOLE BLOOD HOLD SPECIMEN: NORMAL
WHOLE BLOOD HOLD SPECIMEN: NORMAL

## 2022-02-28 PROCEDURE — 81003 URINALYSIS AUTO W/O SCOPE: CPT | Performed by: EMERGENCY MEDICINE

## 2022-02-28 PROCEDURE — 99223 1ST HOSP IP/OBS HIGH 75: CPT | Performed by: INTERNAL MEDICINE

## 2022-02-28 PROCEDURE — 86901 BLOOD TYPING SEROLOGIC RH(D): CPT

## 2022-02-28 PROCEDURE — 80053 COMPREHEN METABOLIC PANEL: CPT | Performed by: EMERGENCY MEDICINE

## 2022-02-28 PROCEDURE — 86900 BLOOD TYPING SEROLOGIC ABO: CPT

## 2022-02-28 PROCEDURE — 86923 COMPATIBILITY TEST ELECTRIC: CPT

## 2022-02-28 PROCEDURE — 74177 CT ABD & PELVIS W/CONTRAST: CPT

## 2022-02-28 PROCEDURE — 86901 BLOOD TYPING SEROLOGIC RH(D): CPT | Performed by: EMERGENCY MEDICINE

## 2022-02-28 PROCEDURE — 99283 EMERGENCY DEPT VISIT LOW MDM: CPT

## 2022-02-28 PROCEDURE — 86850 RBC ANTIBODY SCREEN: CPT | Performed by: EMERGENCY MEDICINE

## 2022-02-28 PROCEDURE — 85025 COMPLETE CBC W/AUTO DIFF WBC: CPT | Performed by: INTERNAL MEDICINE

## 2022-02-28 PROCEDURE — 36415 COLL VENOUS BLD VENIPUNCTURE: CPT

## 2022-02-28 PROCEDURE — 82274 ASSAY TEST FOR BLOOD FECAL: CPT | Performed by: EMERGENCY MEDICINE

## 2022-02-28 PROCEDURE — 85025 COMPLETE CBC W/AUTO DIFF WBC: CPT

## 2022-02-28 PROCEDURE — 86900 BLOOD TYPING SEROLOGIC ABO: CPT | Performed by: EMERGENCY MEDICINE

## 2022-02-28 PROCEDURE — 0 IOPAMIDOL PER 1 ML: Performed by: EMERGENCY MEDICINE

## 2022-02-28 PROCEDURE — 36430 TRANSFUSION BLD/BLD COMPNT: CPT

## 2022-02-28 PROCEDURE — P9016 RBC LEUKOCYTES REDUCED: HCPCS

## 2022-02-28 RX ORDER — PANTOPRAZOLE SODIUM 40 MG/10ML
40 INJECTION, POWDER, LYOPHILIZED, FOR SOLUTION INTRAVENOUS
Status: DISCONTINUED | OUTPATIENT
Start: 2022-03-01 | End: 2022-02-28 | Stop reason: ALTCHOICE

## 2022-02-28 RX ORDER — ACETAMINOPHEN 325 MG/1
650 TABLET ORAL EVERY 4 HOURS PRN
Status: DISCONTINUED | OUTPATIENT
Start: 2022-02-28 | End: 2022-03-03 | Stop reason: HOSPADM

## 2022-02-28 RX ORDER — SODIUM CHLORIDE 0.9 % (FLUSH) 0.9 %
10 SYRINGE (ML) INJECTION EVERY 12 HOURS SCHEDULED
Status: DISCONTINUED | OUTPATIENT
Start: 2022-02-28 | End: 2022-03-01

## 2022-02-28 RX ORDER — ESTRADIOL 0.1 MG/D
1 FILM, EXTENDED RELEASE TRANSDERMAL 2 TIMES WEEKLY
COMMUNITY

## 2022-02-28 RX ORDER — SODIUM CHLORIDE 9 MG/ML
100 INJECTION, SOLUTION INTRAVENOUS CONTINUOUS
Status: DISCONTINUED | OUTPATIENT
Start: 2022-02-28 | End: 2022-03-01

## 2022-02-28 RX ORDER — NITROGLYCERIN 0.4 MG/1
0.4 TABLET SUBLINGUAL
COMMUNITY

## 2022-02-28 RX ORDER — SODIUM CHLORIDE 0.9 % (FLUSH) 0.9 %
10 SYRINGE (ML) INJECTION AS NEEDED
Status: DISCONTINUED | OUTPATIENT
Start: 2022-02-28 | End: 2022-03-03 | Stop reason: HOSPADM

## 2022-02-28 RX ADMIN — PANTOPRAZOLE SODIUM 8 MG/HR: 40 INJECTION, POWDER, LYOPHILIZED, FOR SOLUTION INTRAVENOUS at 21:55

## 2022-02-28 RX ADMIN — PANTOPRAZOLE SODIUM 8 MG/HR: 40 INJECTION, POWDER, LYOPHILIZED, FOR SOLUTION INTRAVENOUS at 16:53

## 2022-02-28 RX ADMIN — SODIUM CHLORIDE 100 ML/HR: 9 INJECTION, SOLUTION INTRAVENOUS at 16:34

## 2022-02-28 RX ADMIN — Medication 10 ML: at 21:55

## 2022-02-28 RX ADMIN — IOPAMIDOL 100 ML: 755 INJECTION, SOLUTION INTRAVENOUS at 12:19

## 2022-03-01 ENCOUNTER — ANESTHESIA EVENT (OUTPATIENT)
Dept: GASTROENTEROLOGY | Facility: HOSPITAL | Age: 62
End: 2022-03-01

## 2022-03-01 ENCOUNTER — ANESTHESIA (OUTPATIENT)
Dept: GASTROENTEROLOGY | Facility: HOSPITAL | Age: 62
End: 2022-03-01

## 2022-03-01 LAB
ALBUMIN SERPL-MCNC: 3 G/DL (ref 3.5–5.2)
ALBUMIN/GLOB SERPL: 1.9 G/DL
ALP SERPL-CCNC: 51 U/L (ref 39–117)
ALT SERPL W P-5'-P-CCNC: 17 U/L (ref 1–33)
ANION GAP SERPL CALCULATED.3IONS-SCNC: 7.2 MMOL/L (ref 5–15)
AST SERPL-CCNC: 18 U/L (ref 1–32)
B-HCG UR QL: NEGATIVE
BASOPHILS # BLD AUTO: 0.02 10*3/MM3 (ref 0–0.2)
BASOPHILS # BLD AUTO: 0.03 10*3/MM3 (ref 0–0.2)
BASOPHILS # BLD AUTO: 0.03 10*3/MM3 (ref 0–0.2)
BASOPHILS # BLD AUTO: 0.04 10*3/MM3 (ref 0–0.2)
BASOPHILS NFR BLD AUTO: 0.3 % (ref 0–1.5)
BASOPHILS NFR BLD AUTO: 0.4 % (ref 0–1.5)
BILIRUB SERPL-MCNC: 0.2 MG/DL (ref 0–1.2)
BUN SERPL-MCNC: 14 MG/DL (ref 8–23)
BUN/CREAT SERPL: 25 (ref 7–25)
CALCIUM SPEC-SCNC: 7.9 MG/DL (ref 8.6–10.5)
CHLORIDE SERPL-SCNC: 111 MMOL/L (ref 98–107)
CO2 SERPL-SCNC: 21.8 MMOL/L (ref 22–29)
CREAT SERPL-MCNC: 0.56 MG/DL (ref 0.57–1)
DEPRECATED RDW RBC AUTO: 45.4 FL (ref 37–54)
DEPRECATED RDW RBC AUTO: 46.2 FL (ref 37–54)
DEPRECATED RDW RBC AUTO: 47.6 FL (ref 37–54)
DEPRECATED RDW RBC AUTO: 48.2 FL (ref 37–54)
EGFRCR SERPLBLD CKD-EPI 2021: 104.6 ML/MIN/1.73
EOSINOPHIL # BLD AUTO: 0.11 10*3/MM3 (ref 0–0.4)
EOSINOPHIL # BLD AUTO: 0.13 10*3/MM3 (ref 0–0.4)
EOSINOPHIL # BLD AUTO: 0.14 10*3/MM3 (ref 0–0.4)
EOSINOPHIL # BLD AUTO: 0.23 10*3/MM3 (ref 0–0.4)
EOSINOPHIL NFR BLD AUTO: 1.2 % (ref 0.3–6.2)
EOSINOPHIL NFR BLD AUTO: 2 % (ref 0.3–6.2)
EOSINOPHIL NFR BLD AUTO: 2 % (ref 0.3–6.2)
EOSINOPHIL NFR BLD AUTO: 3 % (ref 0.3–6.2)
ERYTHROCYTE [DISTWIDTH] IN BLOOD BY AUTOMATED COUNT: 13.4 % (ref 12.3–15.4)
ERYTHROCYTE [DISTWIDTH] IN BLOOD BY AUTOMATED COUNT: 13.9 % (ref 12.3–15.4)
ERYTHROCYTE [DISTWIDTH] IN BLOOD BY AUTOMATED COUNT: 14.1 % (ref 12.3–15.4)
ERYTHROCYTE [DISTWIDTH] IN BLOOD BY AUTOMATED COUNT: 14.3 % (ref 12.3–15.4)
GLOBULIN UR ELPH-MCNC: 1.6 GM/DL
GLUCOSE SERPL-MCNC: 100 MG/DL (ref 65–99)
HCT VFR BLD AUTO: 21.1 % (ref 34–46.6)
HCT VFR BLD AUTO: 21.7 % (ref 34–46.6)
HCT VFR BLD AUTO: 21.8 % (ref 34–46.6)
HCT VFR BLD AUTO: 22.1 % (ref 34–46.6)
HGB BLD-MCNC: 7 G/DL (ref 12–15.9)
HGB BLD-MCNC: 7.2 G/DL (ref 12–15.9)
HGB BLD-MCNC: 7.3 G/DL (ref 12–15.9)
HGB BLD-MCNC: 7.4 G/DL (ref 12–15.9)
IMM GRANULOCYTES # BLD AUTO: 0.02 10*3/MM3 (ref 0–0.05)
IMM GRANULOCYTES # BLD AUTO: 0.03 10*3/MM3 (ref 0–0.05)
IMM GRANULOCYTES NFR BLD AUTO: 0.3 % (ref 0–0.5)
IMM GRANULOCYTES NFR BLD AUTO: 0.3 % (ref 0–0.5)
IMM GRANULOCYTES NFR BLD AUTO: 0.4 % (ref 0–0.5)
IMM GRANULOCYTES NFR BLD AUTO: 0.5 % (ref 0–0.5)
LYMPHOCYTES # BLD AUTO: 2.32 10*3/MM3 (ref 0.7–3.1)
LYMPHOCYTES # BLD AUTO: 2.46 10*3/MM3 (ref 0.7–3.1)
LYMPHOCYTES # BLD AUTO: 2.94 10*3/MM3 (ref 0.7–3.1)
LYMPHOCYTES # BLD AUTO: 3.19 10*3/MM3 (ref 0.7–3.1)
LYMPHOCYTES NFR BLD AUTO: 35.1 % (ref 19.6–45.3)
LYMPHOCYTES NFR BLD AUTO: 35.1 % (ref 19.6–45.3)
LYMPHOCYTES NFR BLD AUTO: 35.3 % (ref 19.6–45.3)
LYMPHOCYTES NFR BLD AUTO: 38.4 % (ref 19.6–45.3)
MAGNESIUM SERPL-MCNC: 2 MG/DL (ref 1.6–2.4)
MCH RBC QN AUTO: 31 PG (ref 26.6–33)
MCH RBC QN AUTO: 31.3 PG (ref 26.6–33)
MCH RBC QN AUTO: 31.7 PG (ref 26.6–33)
MCH RBC QN AUTO: 31.8 PG (ref 26.6–33)
MCHC RBC AUTO-ENTMCNC: 33 G/DL (ref 31.5–35.7)
MCHC RBC AUTO-ENTMCNC: 33.2 G/DL (ref 31.5–35.7)
MCHC RBC AUTO-ENTMCNC: 33.5 G/DL (ref 31.5–35.7)
MCHC RBC AUTO-ENTMCNC: 33.6 G/DL (ref 31.5–35.7)
MCV RBC AUTO: 93.4 FL (ref 79–97)
MCV RBC AUTO: 94.3 FL (ref 79–97)
MCV RBC AUTO: 94.8 FL (ref 79–97)
MCV RBC AUTO: 94.8 FL (ref 79–97)
MONOCYTES # BLD AUTO: 0.25 10*3/MM3 (ref 0.1–0.9)
MONOCYTES # BLD AUTO: 0.49 10*3/MM3 (ref 0.1–0.9)
MONOCYTES # BLD AUTO: 0.54 10*3/MM3 (ref 0.1–0.9)
MONOCYTES # BLD AUTO: 0.6 10*3/MM3 (ref 0.1–0.9)
MONOCYTES NFR BLD AUTO: 3.6 % (ref 5–12)
MONOCYTES NFR BLD AUTO: 6.6 % (ref 5–12)
MONOCYTES NFR BLD AUTO: 7 % (ref 5–12)
MONOCYTES NFR BLD AUTO: 7.4 % (ref 5–12)
NEUTROPHILS NFR BLD AUTO: 3.62 10*3/MM3 (ref 1.7–7)
NEUTROPHILS NFR BLD AUTO: 3.89 10*3/MM3 (ref 1.7–7)
NEUTROPHILS NFR BLD AUTO: 4.11 10*3/MM3 (ref 1.7–7)
NEUTROPHILS NFR BLD AUTO: 5.07 10*3/MM3 (ref 1.7–7)
NEUTROPHILS NFR BLD AUTO: 50.8 % (ref 42.7–76)
NEUTROPHILS NFR BLD AUTO: 54.7 % (ref 42.7–76)
NEUTROPHILS NFR BLD AUTO: 56.2 % (ref 42.7–76)
NEUTROPHILS NFR BLD AUTO: 58.6 % (ref 42.7–76)
NRBC BLD AUTO-RTO: 0 /100 WBC (ref 0–0.2)
PLATELET # BLD AUTO: 179 10*3/MM3 (ref 140–450)
PLATELET # BLD AUTO: 189 10*3/MM3 (ref 140–450)
PLATELET # BLD AUTO: 203 10*3/MM3 (ref 140–450)
PLATELET # BLD AUTO: 214 10*3/MM3 (ref 140–450)
PMV BLD AUTO: 10.1 FL (ref 6–12)
PMV BLD AUTO: 10.3 FL (ref 6–12)
PMV BLD AUTO: 10.7 FL (ref 6–12)
PMV BLD AUTO: 10.9 FL (ref 6–12)
POTASSIUM SERPL-SCNC: 4 MMOL/L (ref 3.5–5.2)
PROT SERPL-MCNC: 4.6 G/DL (ref 6–8.5)
RBC # BLD AUTO: 2.26 10*6/MM3 (ref 3.77–5.28)
RBC # BLD AUTO: 2.3 10*6/MM3 (ref 3.77–5.28)
RBC # BLD AUTO: 2.3 10*6/MM3 (ref 3.77–5.28)
RBC # BLD AUTO: 2.33 10*6/MM3 (ref 3.77–5.28)
SODIUM SERPL-SCNC: 140 MMOL/L (ref 136–145)
WBC NRBC COR # BLD: 6.61 10*3/MM3 (ref 3.4–10.8)
WBC NRBC COR # BLD: 7.01 10*3/MM3 (ref 3.4–10.8)
WBC NRBC COR # BLD: 7.66 10*3/MM3 (ref 3.4–10.8)
WBC NRBC COR # BLD: 9.04 10*3/MM3 (ref 3.4–10.8)

## 2022-03-01 PROCEDURE — 25010000002 PROPOFOL 10 MG/ML EMULSION: Performed by: NURSE ANESTHETIST, CERTIFIED REGISTERED

## 2022-03-01 PROCEDURE — 80053 COMPREHEN METABOLIC PANEL: CPT | Performed by: INTERNAL MEDICINE

## 2022-03-01 PROCEDURE — 85025 COMPLETE CBC W/AUTO DIFF WBC: CPT | Performed by: INTERNAL MEDICINE

## 2022-03-01 PROCEDURE — 81025 URINE PREGNANCY TEST: CPT | Performed by: INTERNAL MEDICINE

## 2022-03-01 PROCEDURE — 88305 TISSUE EXAM BY PATHOLOGIST: CPT | Performed by: INTERNAL MEDICINE

## 2022-03-01 PROCEDURE — 0DB68ZX EXCISION OF STOMACH, VIA NATURAL OR ARTIFICIAL OPENING ENDOSCOPIC, DIAGNOSTIC: ICD-10-PCS | Performed by: INTERNAL MEDICINE

## 2022-03-01 PROCEDURE — 99233 SBSQ HOSP IP/OBS HIGH 50: CPT | Performed by: INTERNAL MEDICINE

## 2022-03-01 PROCEDURE — 43239 EGD BIOPSY SINGLE/MULTIPLE: CPT | Performed by: INTERNAL MEDICINE

## 2022-03-01 PROCEDURE — 83735 ASSAY OF MAGNESIUM: CPT | Performed by: INTERNAL MEDICINE

## 2022-03-01 PROCEDURE — 88342 IMHCHEM/IMCYTCHM 1ST ANTB: CPT | Performed by: INTERNAL MEDICINE

## 2022-03-01 RX ORDER — PETROLATUM,WHITE
1 OINTMENT IN PACKET (GRAM) TOPICAL AS NEEDED
Status: DISCONTINUED | OUTPATIENT
Start: 2022-03-01 | End: 2022-03-03 | Stop reason: HOSPADM

## 2022-03-01 RX ORDER — CYCLOBENZAPRINE HCL 10 MG
10 TABLET ORAL 3 TIMES DAILY PRN
Status: DISCONTINUED | OUTPATIENT
Start: 2022-03-01 | End: 2022-03-03 | Stop reason: HOSPADM

## 2022-03-01 RX ORDER — PROPOFOL 10 MG/ML
VIAL (ML) INTRAVENOUS AS NEEDED
Status: DISCONTINUED | OUTPATIENT
Start: 2022-03-01 | End: 2022-03-01 | Stop reason: SURG

## 2022-03-01 RX ORDER — MAGNESIUM CARB/ALUMINUM HYDROX 105-160MG
296 TABLET,CHEWABLE ORAL ONCE
Status: COMPLETED | OUTPATIENT
Start: 2022-03-02 | End: 2022-03-02

## 2022-03-01 RX ORDER — FLUTICASONE PROPIONATE 50 MCG
2 SPRAY, SUSPENSION (ML) NASAL DAILY
Status: DISCONTINUED | OUTPATIENT
Start: 2022-03-01 | End: 2022-03-01

## 2022-03-01 RX ORDER — CETIRIZINE HYDROCHLORIDE 10 MG/1
10 TABLET ORAL DAILY
Status: DISCONTINUED | OUTPATIENT
Start: 2022-03-01 | End: 2022-03-01

## 2022-03-01 RX ORDER — FLUTICASONE PROPIONATE 50 MCG
2 SPRAY, SUSPENSION (ML) NASAL DAILY PRN
Status: DISCONTINUED | OUTPATIENT
Start: 2022-03-01 | End: 2022-03-03 | Stop reason: HOSPADM

## 2022-03-01 RX ORDER — LIDOCAINE HYDROCHLORIDE 20 MG/ML
INJECTION, SOLUTION INFILTRATION; PERINEURAL AS NEEDED
Status: DISCONTINUED | OUTPATIENT
Start: 2022-03-01 | End: 2022-03-01 | Stop reason: SURG

## 2022-03-01 RX ORDER — BUPROPION HYDROCHLORIDE 150 MG/1
300 TABLET ORAL DAILY
Status: DISCONTINUED | OUTPATIENT
Start: 2022-03-01 | End: 2022-03-03 | Stop reason: HOSPADM

## 2022-03-01 RX ORDER — PHENYLEPHRINE HCL IN 0.9% NACL 1 MG/10 ML
SYRINGE (ML) INTRAVENOUS AS NEEDED
Status: DISCONTINUED | OUTPATIENT
Start: 2022-03-01 | End: 2022-03-01 | Stop reason: SURG

## 2022-03-01 RX ORDER — CETIRIZINE HYDROCHLORIDE 10 MG/1
10 TABLET ORAL DAILY PRN
Status: DISCONTINUED | OUTPATIENT
Start: 2022-03-01 | End: 2022-03-03 | Stop reason: HOSPADM

## 2022-03-01 RX ORDER — SODIUM CHLORIDE, SODIUM LACTATE, POTASSIUM CHLORIDE, CALCIUM CHLORIDE 600; 310; 30; 20 MG/100ML; MG/100ML; MG/100ML; MG/100ML
30 INJECTION, SOLUTION INTRAVENOUS CONTINUOUS
Status: DISCONTINUED | OUTPATIENT
Start: 2022-03-01 | End: 2022-03-01

## 2022-03-01 RX ORDER — MAGNESIUM CARB/ALUMINUM HYDROX 105-160MG
296 TABLET,CHEWABLE ORAL ONCE
Status: COMPLETED | OUTPATIENT
Start: 2022-03-01 | End: 2022-03-01

## 2022-03-01 RX ADMIN — SODIUM CHLORIDE, POTASSIUM CHLORIDE, SODIUM LACTATE AND CALCIUM CHLORIDE: 600; 310; 30; 20 INJECTION, SOLUTION INTRAVENOUS at 10:18

## 2022-03-01 RX ADMIN — BUPROPION HYDROCHLORIDE 300 MG: 150 TABLET, EXTENDED RELEASE ORAL at 12:02

## 2022-03-01 RX ADMIN — PANTOPRAZOLE SODIUM 8 MG/HR: 40 INJECTION, POWDER, LYOPHILIZED, FOR SOLUTION INTRAVENOUS at 19:47

## 2022-03-01 RX ADMIN — Medication 100 MCG: at 10:30

## 2022-03-01 RX ADMIN — PANTOPRAZOLE SODIUM 8 MG/HR: 40 INJECTION, POWDER, LYOPHILIZED, FOR SOLUTION INTRAVENOUS at 12:02

## 2022-03-01 RX ADMIN — PROPOFOL 50 MG: 10 INJECTION, EMULSION INTRAVENOUS at 10:21

## 2022-03-01 RX ADMIN — PROPOFOL 200 MCG/KG/MIN: 10 INJECTION, EMULSION INTRAVENOUS at 10:21

## 2022-03-01 RX ADMIN — PANTOPRAZOLE SODIUM 8 MG/HR: 40 INJECTION, POWDER, LYOPHILIZED, FOR SOLUTION INTRAVENOUS at 02:54

## 2022-03-01 RX ADMIN — MAGNESIUM CITRATE 296 ML: 1.75 LIQUID ORAL at 20:58

## 2022-03-01 RX ADMIN — SODIUM CHLORIDE 100 ML/HR: 9 INJECTION, SOLUTION INTRAVENOUS at 12:02

## 2022-03-01 RX ADMIN — LIDOCAINE HYDROCHLORIDE 100 MG: 20 INJECTION, SOLUTION INFILTRATION; PERINEURAL at 10:21

## 2022-03-01 RX ADMIN — SODIUM CHLORIDE 100 ML/HR: 9 INJECTION, SOLUTION INTRAVENOUS at 01:43

## 2022-03-01 NOTE — ANESTHESIA POSTPROCEDURE EVALUATION
Patient: Viviana Hodges    Procedure Summary     Date: 03/01/22 Room / Location: [facility] ENDOSCOPY 4 / [facility] ENDOSCOPY    Anesthesia Start: 1018 Anesthesia Stop: 1037    Procedure: ESOPHAGOGASTRODUODENOSCOPY WITH BIOPSY (N/A ) Diagnosis:       Gastrointestinal hemorrhage, unspecified gastrointestinal hemorrhage type      (Gastrointestinal hemorrhage, unspecified gastrointestinal hemorrhage type [K92.2])    Surgeons: Santana Cabrera MD Provider: Mesfin Aparicio MD    Anesthesia Type: general ASA Status: 3          Anesthesia Type: general    Vitals  Vitals Value Taken Time   BP 97/49 03/01/22 1047   Temp 36.5 °C (97.7 °F) 03/01/22 1037   Pulse 69 03/01/22 1055   Resp 17 03/01/22 1050   SpO2 100 % 03/01/22 1055   Vitals shown include unvalidated device data.        Post Anesthesia Care and Evaluation    Patient location during evaluation: bedside  Patient participation: complete - patient participated  Level of consciousness: awake  Pain score: 0  Pain management: adequate  Airway patency: patent  Anesthetic complications: No anesthetic complications  PONV Status: none  Cardiovascular status: acceptable and stable  Respiratory status: acceptable and room air  Hydration status: acceptable    Comments: An Anesthesiologist personally participated in the most demanding procedures (including induction and emergence if applicable) in the anesthesia plan, monitored the course of anesthesia administration at frequent intervals and remained physically present and available for immediate diagnosis and treatment of emergencies.

## 2022-03-01 NOTE — CASE MANAGEMENT/SOCIAL WORK
Discharge Planning Assessment   Magdiel     Patient Name: Viviana Hodges  MRN: [id number]  Today's Date: 3/1/2022    Admit Date: 2/28/2022     Discharge Needs Assessment     Row Name 03/01/22 1331       Living Environment    Lives With spouse    Name(s) of Who Lives With Patient Jason    Current Living Arrangements home/apartment/condo    Primary Care Provided by self    Provides Primary Care For no one    Family Caregiver if Needed spouse    Family Caregiver Names Jason    Quality of Family Relationships helpful; involved; supportive    Able to Return to Prior Arrangements yes       Resource/Environmental Concerns    Resource/Environmental Concerns none       Transition Planning    Patient/Family Anticipates Transition to home with family    Patient/Family Anticipated Services at Transition none    Transportation Anticipated family or friend will provide       Discharge Needs Assessment    Readmission Within the Last 30 Days no previous admission in last 30 days    Equipment Currently Used at Home none    Concerns to be Addressed denies needs/concerns at this time    Anticipated Changes Related to Illness none    Equipment Needed After Discharge none               Discharge Plan     Row Name 03/01/22 5072       Plan    Plan Patient sitting in bed with spouse at bedside.  Reports works fulltime and provides own IADL's.  Good family support.  Verified demographics, phone, PCP and pharmacy.  Patient plans to return home with no needs at this time    Patient/Family in Agreement with Plan yes              Continued Care and Services - Admitted Since 2/28/2022    Coordination has not been started for this encounter.          Demographic Summary     Row Name 03/01/22 1328       General Information    Admission Type inpatient    Arrived From emergency department    Preferred Language English     Used During This Interaction no       Contact Information    Permission Granted to Share Info With  family/designee    Contact Information Comments Jason, spouse               Functional Status     Row Name 03/01/22 4346       Functional Status    Usual Activity Tolerance excellent    Current Activity Tolerance good       Functional Status, IADL    Medications independent    Meal Preparation independent    Housekeeping independent    Laundry independent    Shopping independent       Mental Status    General Appearance WDL WDL       Mental Status Summary    Recent Changes in Mental Status/Cognitive Functioning no changes       Employment/    Employment Status employed full-time    Current or Previous Occupation desk job               Psychosocial    No documentation.                Abuse/Neglect    No documentation.                Legal    No documentation.                Substance Abuse    No documentation.                Patient Forms    No documentation.                   Jennifer Gillette RN

## 2022-03-01 NOTE — PLAN OF CARE
Goal Outcome Evaluation PATIENT VITALS STABLE, TRANSFUSED ONE UNIT OF PRBC  PER ORDER ,NO REACTION NOTED . PT HAD X1 TARRY MODERATE STOOL,DENIES SHORTNESS OF AIR /DIZZINESS. CONTINUE PLAN OF CARE.

## 2022-03-01 NOTE — PROGRESS NOTES
[facility]   Hospitalist Progress Note  Date: 3/1/2022  Patient Name: Viviana Hodges  : 1960  MRN: [id number]  Date of admission: 2022      Subjective   Subjective     Chief Complaint: Melena    Summary: 62 y.o. female past medical history of coronary artery disease of stenting, GERD, dyslipidemia, and obesity status post gastric bypass who presents with 4 to 5 days of melena and weakness     Patient had gastric bypass about 4 years ago she states.  She is done relatively well since that time.  Notes that she is feeling kind of poor about 8 days ago with some diarrhea.  Then on Friday of last week the patient developed melena and some extreme weakness.  Continued to have melena over the last several days and as result came to the ER for further evaluation.  She does have occasional chest pain with activity.  Not sure what her baseline hemoglobin is.  Had no fevers.  No chills.  No bright red blood per rectum.  Result of all she came to the ER for further evaluation.     In the emergency department the patient's vital signs are as follows: Her temperature is 98, heart rate is 104, blood pressure 100/66, 98% on room air.  CMP shows a creatinine 1.67 and a glucose of 137.  CBC shows a white blood cell count 9.8 and a Hemoglobin of 7.5 with no baseline.  CT the abdomen pelvis showed no cause of bleeding.  Gastroenterology saw the patient in the ER and stated that they would do an EGD tomorrow to assess for melena.  Patient will be admitted for acute blood loss anemia.     Interval Followup: No acute events overnight.  She did not have any further melena that she noticed since admission.  She did require PRBC transfusion after hemoglobin dropped on admission.  She states she does feel little better after the blood transfusion.     Review of Systems   All systems reviewed and negative unless otherwise stated under interval follow-up    Objective   Objective     Vitals:   Temp:  [97.7 °F (36.5  °C)-98.4 °F (36.9 °C)] 98.1 °F (36.7 °C)  Heart Rate:  [56-84] 64  Resp:  [13-20] 16  BP: ()/() 95/54  Physical Exam    Constitutional: Awake, alert, no acute distress   Eyes: Pupils equal, sclerae anicteric, no conjunctival injection   HENT: NCAT, mucous membranes moist   Neck: Supple, no thyromegaly, no lymphadenopathy, trachea midline   Respiratory: Clear to auscultation bilaterally, nonlabored respirations    Cardiovascular: RRR, no murmurs, rubs, or gallops, palpable pedal pulses bilaterally   Gastrointestinal: Positive bowel sounds, soft, nontender, nondistended   Musculoskeletal: No bilateral ankle edema, no clubbing or cyanosis to extremities   Psychiatric: Appropriate affect, cooperative   Neurologic: Oriented x 3, strength symmetric in all extremities, Cranial Nerves grossly intact to confrontation, speech clear   Skin: No rashes     Result Review    Result Review:  I have personally reviewed the results from the time of this admission to 3/1/2022 17:41 EST and agree with these findings:  [x]  Laboratory sodium 140, potassium 4.0, creatinine 0.56, calcium 7.9, WBC 7, hemoglobin 7.4, platelets 214  []  Microbiology  []  Radiology  [x]  EKG/Telemetry telemetry reviewed showed normal sinus rhythm  []  Cardiology/Vascular   []  Pathology  []  Old records  []  Other:    Assessment/Plan   Assessment / Plan     Assessment/Plan:  Melena concern for upper GI bleed  Acute blood loss anemia with hemoglobin of 7.5  Coronary artery disease with previous stenting on Brilinta  Hypertension  Dyslipidemia  GERD     Continue to monitor in the hospital for management of the above  Gastroenterology consulted, appreciate assistance  Underwent EGD without any evidence of active bleeding  Continue Protonix drip  Plan for colonoscopy tomorrow  Bowel prep tonight, n.p.o. after midnight  Hold Brilinta and aspirin until deemed safe per GI  DC IV fluids  Continue appropriate home medications  Trend renal function and  electrolytes with a.m. BMP  Trend Hgb and WBC with a.m. CBC     Discussed plan with RN, gastroenterology    DVT prophylaxis:  Mechanical DVT prophylaxis orders are present.    CODE STATUS:   Level Of Support Discussed With: Patient  Code Status (Patient has no pulse and is not breathing): CPR (Attempt to Resuscitate)  Medical Interventions (Patient has pulse or is breathing): Full Support        Electronically signed by Sandeep Ponce MD, 03/01/22, 5:41 PM EST.

## 2022-03-01 NOTE — DISCHARGE INSTRUCTIONS
Monitored Anesthesia Care  Anesthesia refers to techniques, procedures, and medicines that help a person stay safe and comfortable during a medical or dental procedure. Monitored anesthesia care, or sedation, is one type of anesthesia. Your anesthesia specialist may recommend sedation if you will be having a procedure that does not require you to be unconscious. You may have this procedure for:  · Cataract surgery.  · A dental procedure.  · A biopsy.  · A colonoscopy.  During the procedure, you may receive a medicine to help you relax (sedative). There are three levels of sedation:  · Mild sedation. At this level, you may feel awake and relaxed. You will be able to follow directions.  · Moderate sedation. At this level, you will be sleepy. You may not remember the procedure.  · Deep sedation. At this level, you will be asleep. You will not remember the procedure.  The more medicine you are given, the deeper your level of sedation will be. Depending on how you respond to the procedure, the anesthesia specialist may change your level of sedation or the type of anesthesia to fit your needs. An anesthesia specialist will monitor you closely during the procedure.  Tell a health care provider about:  · Any allergies you have.  · All medicines you are taking, including vitamins, herbs, eye drops, creams, and over-the-counter medicines.  · Any problems you or family members have had with anesthetic medicines.  · Any blood disorders you have.  · Any surgeries you have had.  · Any medical conditions you have, such as sleep apnea.  · Whether you are pregnant or may be pregnant.  · Whether you use cigarettes, alcohol, or drugs.  · Any use of steroids, whether by mouth or as a cream.  What are the risks?  Generally, this is a safe procedure. However, problems may occur, including:  · Getting too much medicine (oversedation).  · Nausea.  · Allergic reaction to medicines.  · Trouble breathing. If this happens, a breathing tube may  be used to help with breathing. It will be removed when you are awake and breathing on your own.  · Heart trouble.  · Lung trouble.  · Confusion that gets better with time (emergence delirium).  What happens before the procedure?  Staying hydrated  Follow instructions from your health care provider about hydration, which may include:  · Up to 2 hours before the procedure - you may continue to drink clear liquids, such as water, clear fruit juice, black coffee, and plain tea.  Eating and drinking restrictions  Follow instructions from your health care provider about eating and drinking, which may include:  · 8 hours before the procedure - stop eating heavy meals or foods, such as meat, fried foods, or fatty foods.  · 6 hours before the procedure - stop eating light meals or foods, such as toast or cereal.  · 6 hours before the procedure - stop drinking milk or drinks that contain milk.  · 2 hours before the procedure - stop drinking clear liquids.  Medicines  Ask your health care provider about:  · Changing or stopping your regular medicines. This is especially important if you are taking diabetes medicines or blood thinners.  · Taking medicines such as aspirin and ibuprofen. These medicines can thin your blood. Do not take these medicines unless your health care provider tells you to take them.  · Taking over-the-counter medicines, vitamins, herbs, and supplements.  Tests and exams  · You will have a physical exam.  · You may have blood tests done to show:  ? How well your kidneys and liver are working.  ? How well your blood can clot.  General instructions  · Plan to have a responsible adult take you home from the hospital or clinic.  · If you will be going home right after the procedure, plan to have a responsible adult care for you for the time you are told. This is important.  What happens during the procedure?    · Your blood pressure, heart rate, breathing, level of pain, and overall condition will be  monitored.  · An IV will be inserted into one of your veins.  · You will be given medicines as needed to keep you comfortable during the procedure. This may mean changing the level of sedation.  ? Depending on your age or the procedure, the sedative may be given:  § As a pill that you will swallow or as a pill that is inserted into the rectum.  § As an injection into the vein or muscle.  § As a spray through the nose.  · The procedure will be performed.  · Your breathing, heart rate, and blood pressure will be monitored during the procedure.  · When the procedure is over, the medicine will be stopped.  The procedure may vary among health care providers and hospitals.  What happens after the procedure?  · Your blood pressure, heart rate, breathing rate, and blood oxygen level will be monitored until you leave the hospital or clinic.  · You may feel sleepy, clumsy, or nauseous.  · You may feel forgetful about what happened after the procedure.  · You may vomit.  · You may continue to get IV fluids.  · Do not drive or operate machinery until your health care provider says that it is safe.  Summary  · Monitored anesthesia care is used to keep a patient comfortable during short procedures.  · Tell your health care provider about any allergies or health conditions you have and about all the medicines you are taking.  · Before the procedure, follow instructions about when to stop eating and drinking and about changing or stopping any medicines.  · Your blood pressure, heart rate, breathing rate, and blood oxygen level will be monitored until you leave the hospital or clinic.  · Plan to have a responsible adult take you home from the hospital or clinic.  This information is not intended to replace advice given to you by your health care provider. Make sure you discuss any questions you have with your health care provider.  Document Revised: 04/23/2021 Document Reviewed: 11/19/2020  Elsevier Patient Education © 2021 [employer]  Upper Endoscopy, Adult, Care After  This sheet gives you information about how to care for yourself after your procedure. Your health care provider may also give you more specific instructions. If you have problems or questions, contact your health care provider.  What can I expect after the procedure?  After the procedure, it is common to have:  · A sore throat.  · Mild stomach pain or discomfort.  · Bloating.  · Nausea.  Follow these instructions at home:    · Follow instructions from your health care provider about what to eat or drink after your procedure.  · Return to your normal activities as told by your health care provider. Ask your health care provider what activities are safe for you.  · Take over-the-counter and prescription medicines only as told by your health care provider.  · If you were given a sedative during the procedure, it can affect you for several hours. Do not drive or operate machinery until your health care provider says that it is safe.  · Keep all follow-up visits as told by your health care provider. This is important.  Contact a health care provider if you have:  · A sore throat that lasts longer than one day.  · Trouble swallowing.  Get help right away if:  · You vomit blood or your vomit looks like coffee grounds.  · You have:  ? A fever.  ? Bloody, black, or tarry stools.  ? A severe sore throat or you cannot swallow.  ? Difficulty breathing.  ? Severe pain in your chest or abdomen.  Summary  · After the procedure, it is common to have a sore throat, mild stomach discomfort, bloating, and nausea.  · If you were given a sedative during the procedure, it can affect you for several hours. Do not drive or operate machinery until your health care provider says that it is safe.  · Follow instructions from your health care provider about what to eat or drink after your procedure.  · Return to your normal activities as told by your health care provider.  This information is not  intended to replace advice given to you by your health care provider. Make sure you discuss any questions you have with your health care provider.  Document Revised: 12/15/2020 Document Reviewed: 05/20/2019  Elsevier Patient Education © 2021 [employer]

## 2022-03-02 ENCOUNTER — ANESTHESIA EVENT (OUTPATIENT)
Dept: GASTROENTEROLOGY | Facility: HOSPITAL | Age: 62
End: 2022-03-02

## 2022-03-02 ENCOUNTER — ANESTHESIA (OUTPATIENT)
Dept: GASTROENTEROLOGY | Facility: HOSPITAL | Age: 62
End: 2022-03-02

## 2022-03-02 LAB
BASOPHILS # BLD AUTO: 0.03 10*3/MM3 (ref 0–0.2)
BASOPHILS # BLD AUTO: 0.03 10*3/MM3 (ref 0–0.2)
BASOPHILS NFR BLD AUTO: 0.4 % (ref 0–1.5)
BASOPHILS NFR BLD AUTO: 0.4 % (ref 0–1.5)
BH BB BLOOD EXPIRATION DATE: NORMAL
BH BB BLOOD TYPE BARCODE: 5100
BH BB DISPENSE STATUS: NORMAL
BH BB PRODUCT CODE: NORMAL
BH BB UNIT NUMBER: NORMAL
CROSSMATCH INTERPRETATION: NORMAL
DEPRECATED RDW RBC AUTO: 47.2 FL (ref 37–54)
DEPRECATED RDW RBC AUTO: 48.1 FL (ref 37–54)
EOSINOPHIL # BLD AUTO: 0.32 10*3/MM3 (ref 0–0.4)
EOSINOPHIL # BLD AUTO: 0.32 10*3/MM3 (ref 0–0.4)
EOSINOPHIL NFR BLD AUTO: 3.8 % (ref 0.3–6.2)
EOSINOPHIL NFR BLD AUTO: 3.9 % (ref 0.3–6.2)
ERYTHROCYTE [DISTWIDTH] IN BLOOD BY AUTOMATED COUNT: 14.1 % (ref 12.3–15.4)
ERYTHROCYTE [DISTWIDTH] IN BLOOD BY AUTOMATED COUNT: 14.3 % (ref 12.3–15.4)
HCT VFR BLD AUTO: 23.3 % (ref 34–46.6)
HCT VFR BLD AUTO: 24.3 % (ref 34–46.6)
HGB BLD-MCNC: 7.7 G/DL (ref 12–15.9)
HGB BLD-MCNC: 7.9 G/DL (ref 12–15.9)
IMM GRANULOCYTES # BLD AUTO: 0.03 10*3/MM3 (ref 0–0.05)
IMM GRANULOCYTES # BLD AUTO: 0.04 10*3/MM3 (ref 0–0.05)
IMM GRANULOCYTES NFR BLD AUTO: 0.4 % (ref 0–0.5)
IMM GRANULOCYTES NFR BLD AUTO: 0.5 % (ref 0–0.5)
LYMPHOCYTES # BLD AUTO: 2.29 10*3/MM3 (ref 0.7–3.1)
LYMPHOCYTES # BLD AUTO: 2.98 10*3/MM3 (ref 0.7–3.1)
LYMPHOCYTES NFR BLD AUTO: 27.7 % (ref 19.6–45.3)
LYMPHOCYTES NFR BLD AUTO: 35.5 % (ref 19.6–45.3)
MAGNESIUM SERPL-MCNC: 2.5 MG/DL (ref 1.6–2.4)
MCH RBC QN AUTO: 31.1 PG (ref 26.6–33)
MCH RBC QN AUTO: 31.2 PG (ref 26.6–33)
MCHC RBC AUTO-ENTMCNC: 32.5 G/DL (ref 31.5–35.7)
MCHC RBC AUTO-ENTMCNC: 33 G/DL (ref 31.5–35.7)
MCV RBC AUTO: 94.3 FL (ref 79–97)
MCV RBC AUTO: 95.7 FL (ref 79–97)
MONOCYTES # BLD AUTO: 0.55 10*3/MM3 (ref 0.1–0.9)
MONOCYTES # BLD AUTO: 0.72 10*3/MM3 (ref 0.1–0.9)
MONOCYTES NFR BLD AUTO: 6.6 % (ref 5–12)
MONOCYTES NFR BLD AUTO: 8.7 % (ref 5–12)
NEUTROPHILS NFR BLD AUTO: 4.47 10*3/MM3 (ref 1.7–7)
NEUTROPHILS NFR BLD AUTO: 4.88 10*3/MM3 (ref 1.7–7)
NEUTROPHILS NFR BLD AUTO: 53.2 % (ref 42.7–76)
NEUTROPHILS NFR BLD AUTO: 58.9 % (ref 42.7–76)
NRBC BLD AUTO-RTO: 0 /100 WBC (ref 0–0.2)
NRBC BLD AUTO-RTO: 0 /100 WBC (ref 0–0.2)
PHOSPHATE SERPL-MCNC: 3.2 MG/DL (ref 2.5–4.5)
PLATELET # BLD AUTO: 234 10*3/MM3 (ref 140–450)
PLATELET # BLD AUTO: 238 10*3/MM3 (ref 140–450)
PMV BLD AUTO: 10.5 FL (ref 6–12)
PMV BLD AUTO: 9.9 FL (ref 6–12)
RBC # BLD AUTO: 2.47 10*6/MM3 (ref 3.77–5.28)
RBC # BLD AUTO: 2.54 10*6/MM3 (ref 3.77–5.28)
UNIT  ABO: NORMAL
UNIT  RH: NORMAL
WBC NRBC COR # BLD: 8.27 10*3/MM3 (ref 3.4–10.8)
WBC NRBC COR # BLD: 8.39 10*3/MM3 (ref 3.4–10.8)

## 2022-03-02 PROCEDURE — 85025 COMPLETE CBC W/AUTO DIFF WBC: CPT | Performed by: INTERNAL MEDICINE

## 2022-03-02 PROCEDURE — 45378 DIAGNOSTIC COLONOSCOPY: CPT | Performed by: INTERNAL MEDICINE

## 2022-03-02 PROCEDURE — 93005 ELECTROCARDIOGRAM TRACING: CPT | Performed by: STUDENT IN AN ORGANIZED HEALTH CARE EDUCATION/TRAINING PROGRAM

## 2022-03-02 PROCEDURE — 84100 ASSAY OF PHOSPHORUS: CPT | Performed by: INTERNAL MEDICINE

## 2022-03-02 PROCEDURE — 99232 SBSQ HOSP IP/OBS MODERATE 35: CPT | Performed by: INTERNAL MEDICINE

## 2022-03-02 PROCEDURE — 83735 ASSAY OF MAGNESIUM: CPT | Performed by: INTERNAL MEDICINE

## 2022-03-02 PROCEDURE — 0DJD8ZZ INSPECTION OF LOWER INTESTINAL TRACT, VIA NATURAL OR ARTIFICIAL OPENING ENDOSCOPIC: ICD-10-PCS | Performed by: INTERNAL MEDICINE

## 2022-03-02 PROCEDURE — 25010000002 PROPOFOL 10 MG/ML EMULSION

## 2022-03-02 PROCEDURE — 93010 ELECTROCARDIOGRAM REPORT: CPT | Performed by: SPECIALIST

## 2022-03-02 RX ORDER — ASPIRIN 81 MG/1
81 TABLET, CHEWABLE ORAL DAILY
Status: DISCONTINUED | OUTPATIENT
Start: 2022-03-02 | End: 2022-03-03 | Stop reason: HOSPADM

## 2022-03-02 RX ORDER — PROPOFOL 10 MG/ML
VIAL (ML) INTRAVENOUS AS NEEDED
Status: DISCONTINUED | OUTPATIENT
Start: 2022-03-02 | End: 2022-03-02 | Stop reason: SURG

## 2022-03-02 RX ORDER — PANTOPRAZOLE SODIUM 40 MG/1
40 TABLET, DELAYED RELEASE ORAL
Status: DISCONTINUED | OUTPATIENT
Start: 2022-03-03 | End: 2022-03-03 | Stop reason: HOSPADM

## 2022-03-02 RX ORDER — SODIUM CHLORIDE, SODIUM LACTATE, POTASSIUM CHLORIDE, CALCIUM CHLORIDE 600; 310; 30; 20 MG/100ML; MG/100ML; MG/100ML; MG/100ML
30 INJECTION, SOLUTION INTRAVENOUS CONTINUOUS
Status: DISCONTINUED | OUTPATIENT
Start: 2022-03-02 | End: 2022-03-02

## 2022-03-02 RX ORDER — MAGNESIUM CARB/ALUMINUM HYDROX 105-160MG
296 TABLET,CHEWABLE ORAL ONCE
Status: COMPLETED | OUTPATIENT
Start: 2022-03-02 | End: 2022-03-02

## 2022-03-02 RX ORDER — LIDOCAINE HYDROCHLORIDE 20 MG/ML
INJECTION, SOLUTION INFILTRATION; PERINEURAL AS NEEDED
Status: DISCONTINUED | OUTPATIENT
Start: 2022-03-02 | End: 2022-03-02 | Stop reason: SURG

## 2022-03-02 RX ADMIN — LIDOCAINE HYDROCHLORIDE 60 MG: 20 INJECTION, SOLUTION INFILTRATION; PERINEURAL at 14:09

## 2022-03-02 RX ADMIN — ACETAMINOPHEN 650 MG: 325 TABLET ORAL at 05:33

## 2022-03-02 RX ADMIN — TICAGRELOR 90 MG: 90 TABLET ORAL at 20:38

## 2022-03-02 RX ADMIN — MAGNESIUM CITRATE 296 ML: 1.75 LIQUID ORAL at 09:10

## 2022-03-02 RX ADMIN — PANTOPRAZOLE SODIUM 8 MG/HR: 40 INJECTION, POWDER, LYOPHILIZED, FOR SOLUTION INTRAVENOUS at 05:33

## 2022-03-02 RX ADMIN — LIDOCAINE HYDROCHLORIDE 40 MG: 20 INJECTION, SOLUTION INFILTRATION; PERINEURAL at 14:28

## 2022-03-02 RX ADMIN — PANTOPRAZOLE SODIUM 8 MG/HR: 40 INJECTION, POWDER, LYOPHILIZED, FOR SOLUTION INTRAVENOUS at 00:54

## 2022-03-02 RX ADMIN — PROPOFOL 200 MCG/KG/MIN: 10 INJECTION, EMULSION INTRAVENOUS at 14:09

## 2022-03-02 RX ADMIN — ASPIRIN 81 MG CHEWABLE TABLET 81 MG: 81 TABLET CHEWABLE at 17:36

## 2022-03-02 RX ADMIN — PANTOPRAZOLE SODIUM 8 MG/HR: 40 INJECTION, POWDER, LYOPHILIZED, FOR SOLUTION INTRAVENOUS at 10:40

## 2022-03-02 RX ADMIN — PROPOFOL 50 MG: 10 INJECTION, EMULSION INTRAVENOUS at 14:09

## 2022-03-02 RX ADMIN — MAGNESIUM CITRATE 296 ML: 1.75 LIQUID ORAL at 05:33

## 2022-03-02 RX ADMIN — SODIUM CHLORIDE, POTASSIUM CHLORIDE, SODIUM LACTATE AND CALCIUM CHLORIDE: 600; 310; 30; 20 INJECTION, SOLUTION INTRAVENOUS at 14:05

## 2022-03-02 RX ADMIN — WHITE PETROLATUM 1 APPLICATION: 1 JELLY TOPICAL at 02:47

## 2022-03-02 NOTE — PLAN OF CARE
Problem: Fall Injury Risk  Goal: Absence of Fall and Fall-Related Injury  Outcome: Ongoing, Progressing  Intervention: Promote Injury-Free Environment  Recent Flowsheet Documentation  Taken 3/2/2022 0800 by Carley Almazan RN  Safety Promotion/Fall Prevention: safety round/check completed   Goal Outcome Evaluation:              Outcome Summary: Pt completed bowel prep for colonoscopy today, pt back from procedure, awake and alert. Advancing to regular diet per MD.

## 2022-03-02 NOTE — ANESTHESIA POSTPROCEDURE EVALUATION
Patient: Viviana Hodges    Procedure Summary     Date: 03/02/22 Room / Location: [facility] ENDOSCOPY 4 / [facility] ENDOSCOPY    Anesthesia Start: 1405 Anesthesia Stop: 1440    Procedure: COLONOSCOPY (N/A ) Diagnosis:       Gastrointestinal hemorrhage, unspecified gastrointestinal hemorrhage type      (Gastrointestinal hemorrhage, unspecified gastrointestinal hemorrhage type [K92.2])    Surgeons: Santana Cabrera MD Provider: Marti Garces DO    Anesthesia Type: general ASA Status: 3 - Emergent          Anesthesia Type: general    Vitals  Vitals Value Taken Time   /78 03/02/22 1455   Temp     Pulse 66 03/02/22 1455   Resp 18 03/02/22 1455   SpO2 99 % 03/02/22 1455           Post Anesthesia Care and Evaluation    Patient location during evaluation: bedside  Patient participation: complete - patient participated  Level of consciousness: awake  Pain management: adequate  Airway patency: patent  Anesthetic complications: No anesthetic complications  PONV Status: none  Cardiovascular status: acceptable and stable  Respiratory status: acceptable  Hydration status: acceptable    Comments: An Anesthesiologist personally participated in the most demanding procedures (including induction and emergence if applicable) in the anesthesia plan, monitored the course of anesthesia administration at frequent intervals and remained physically present and available for immediate diagnosis and treatment of emergencies.

## 2022-03-02 NOTE — PROGRESS NOTES
[facility]   Hospitalist Progress Note  Date: 3/2/2022  Patient Name: Viviana Hodges  : 1960  MRN: [id number]  Date of admission: 2022      Subjective   Subjective     Chief Complaint: Melena    Summary: 62 y.o. female past medical history of coronary artery disease of stenting, GERD, dyslipidemia, and obesity status post gastric bypass who presents with 4 to 5 days of melena and weakness     Patient had gastric bypass about 4 years ago she states.  She is done relatively well since that time.  Notes that she is feeling kind of poor about 8 days ago with some diarrhea.  Then on Friday of last week the patient developed melena and some extreme weakness.  Continued to have melena over the last several days and as result came to the ER for further evaluation.  She does have occasional chest pain with activity.  Not sure what her baseline hemoglobin is.  Had no fevers.  No chills.  No bright red blood per rectum.  Result of all she came to the ER for further evaluation.     In the emergency department the patient's vital signs are as follows: Her temperature is 98, heart rate is 104, blood pressure 100/66, 98% on room air.  CMP shows a creatinine 1.67 and a glucose of 137.  CBC shows a white blood cell count 9.8 and a Hemoglobin of 7.5 with no baseline.  CT the abdomen pelvis showed no cause of bleeding.  Gastroenterology saw the patient in the ER and stated that they would do an EGD tomorrow to assess for melena.  Patient will be admitted for acute blood loss anemia.     Interval Followup: Patient had multiple dark to black watery bowel movements after her bowel prep.  Denies any abdominal pain or cramping.  States her energy levels are improved and she denies weakness.  Otherwise no new complaints.    Review of Systems   All systems reviewed and negative unless otherwise stated under interval follow-up    Objective   Objective     Vitals:   Temp:  [97.5 °F (36.4 °C)-98.4 °F (36.9 °C)] 97.5 °F  (36.4 °C)  Heart Rate:  [63-75] 63  Resp:  [15-20] 16  BP: (104-130)/(49-78) 130/59  Physical Exam    Constitutional: Awake, alert, no acute distress, pleasant   Eyes: Pupils equal, sclerae anicteric, no conjunctival injection   HENT: NCAT, mucous membranes moist   Neck: Supple, no thyromegaly, no lymphadenopathy, trachea midline   Respiratory: Clear to auscultation bilaterally, nonlabored respirations    Cardiovascular: RRR, no murmurs, rubs, or gallops   Gastrointestinal: Positive bowel sounds, soft, nontender, nondistended   Musculoskeletal: No bilateral ankle edema, no clubbing or cyanosis to extremities   Psychiatric: Appropriate affect, cooperative   Neurologic: Oriented x 3, strength symmetric in all extremities, Cranial Nerves grossly intact to confrontation, speech clear   Skin: No rashes     Result Review    Result Review:  I have personally reviewed the results from the time of this admission to 3/2/2022 16:02 EST and agree with these findings:  [x]  Laboratory WBC 8, hemoglobin 7.9, platelets 234  []  Microbiology  []  Radiology  [x]  EKG/Telemetry telemetry reviewed showed normal sinus rhythm  []  Cardiology/Vascular   []  Pathology  []  Old records  []  Other:    Assessment/Plan   Assessment / Plan     Assessment/Plan:  Melena concern for upper GI bleed  Acute blood loss anemia with hemoglobin of 7.5  Coronary artery disease with previous stenting on Brilinta  Hypertension  Dyslipidemia  GERD     Continue to monitor in the hospital for management of the above  Gastroenterology consulted, appreciate assistance  Underwent EGD without any evidence of active bleeding  Underwent colonoscopy today that did not show any evidence of active bleeding  Will discontinue Protonix drip and transition to p.o. Protonix daily  Will discuss with GI when DAPT can be restarted safely  DC IV fluids  Continue appropriate home medications  Trend Hgb and WBC with a.m. CBC     Discussed plan with RN, gastroenterology    DVT  prophylaxis:  Mechanical DVT prophylaxis orders are present.    CODE STATUS:   Level Of Support Discussed With: Patient  Code Status (Patient has no pulse and is not breathing): CPR (Attempt to Resuscitate)  Medical Interventions (Patient has pulse or is breathing): Full Support

## 2022-03-02 NOTE — ANESTHESIA PREPROCEDURE EVALUATION
Anesthesia Evaluation     Patient summary reviewed and Nursing notes reviewed   no history of anesthetic complications:  NPO Solid Status: > 8 hours  NPO Liquid Status: > 2 hours           Airway   Mallampati: II  TM distance: >3 FB  No difficulty expected  Dental - normal exam     Pulmonary - normal exam   (+) pneumonia (COVID) resolved ,   Cardiovascular - normal exam    ECG reviewed    (+) hypertension, CAD, cardiac stents hyperlipidemia,       Neuro/Psych  (+) psychiatric history Anxiety and Depression,    GI/Hepatic/Renal/Endo    (+) obesity, morbid obesity, GERD, GI bleeding ,     Musculoskeletal     (+) back pain,   Abdominal  - normal exam   Substance History - negative use     OB/GYN negative ob/gyn ROS         Other   arthritis,      ROS/Med Hx Other: Admitted for likely GI bleed with melena. EGD yesterday did not reveal any source of bleeding. Was on Brilinta. Most recent Hgb 7.9. denies emesis                       Anesthesia Plan    ASA 3 - emergent     general   (Total IV Anesthesia    Patient understands anesthesia not responsible for dental damage.  )  intravenous induction     Anesthetic plan, all risks, benefits, and alternatives have been provided, discussed and informed consent has been obtained with: patient.    Plan discussed with CRNA.        CODE STATUS:    Level Of Support Discussed With: Patient  Code Status (Patient has no pulse and is not breathing): CPR (Attempt to Resuscitate)  Medical Interventions (Patient has pulse or is breathing): Full Support

## 2022-03-02 NOTE — SIGNIFICANT NOTE
03/01/22 1335   Coping/Psychosocial   Observed Emotional State calm; cooperative   Verbalized Emotional State hopelessness   Trust Relationship/Rapport empathic listening provided   Family/Support Persons spouse   Involvement in Care interacting with patient   Additional Documentation Spiritual Care (Group)   Spiritual Care   Use of Spiritual Resources non-Christian use of spiritual care   Spiritual Care Source  initiative   Spiritual Care Follow-Up follow-up, none required as presently assessed   Response to Spiritual Care receptive of support   Spiritual Care Interventions supportive conversation provided   Spiritual Care Visit Type initial   Receptivity to Spiritual Care visit welcomed     
96

## 2022-03-02 NOTE — PAYOR COMM NOTE
"Jean Pierre Hodges (62 y.o. Female)             Date of Birth Social Security Number Address Home Phone MRN    1960  [address] [facility] [phone] [id number]    Holiness Marital Status             Unknown        Admission Date Admission Type Admitting Provider Attending Provider Department, Room/Bed    2/28/22 Emergency Ramiro Bender MD Klimchak, Nicholas, MD [facility] 5TH FLOOR SURGICAL TELEMETRY UNIT, 522/1    Discharge Date Discharge Disposition Discharge Destination                         Attending Provider: Sandeep Sheppard MD    Allergies: Shellfish Allergy, Shellfish-derived Products, Penicillins    Isolation: None   Infection: None   Code Status: CPR   Advance Care Planning Activity    Ht: 172.7 cm (68\")   Wt: 88.3 kg (194 lb 10.7 oz)    Admission Cmt: None   Principal Problem: None                Active Insurance as of 2/28/2022     Primary Coverage     Payor Plan Insurance Group Employer/Plan Group    ANTHEM BLUE CROSS [facility] [employer] BLUE [facility] PPO [id number]     Payor Plan Address Payor Plan Phone Number Payor Plan Fax Number Effective Dates    [address] [phone]  1/1/2018 - None Entered    [place] [address]       Subscriber Name Subscriber Birth Date Member ID       JEAN PIERRE HODGES 1960 [id number]                 Emergency Contacts      (Rel.) Home Phone Work Phone Mobile Phone    VARGAS HODGES (Spouse) -- -- [phone]        icd 10 code-k92.2             Pastora Orlando      Case Management   Payor Comm Note      Signed   Date of Service:  02/28/22 1516   Creation Time:  02/28/22 1516              Signed        Summary: INITIAL CLINICALS         Show:Clear all  [x]Manual[x]Template[]Copied    Added by:  [x]Pastora Orlando      []Alisa for details    Jean Pierre Hodges (62 y.o. Female)                           Date of Birth Social Security Number Address Home Phone MRN     1960 " " [id number] [facility] [address] [phone] [id number]     Jainism Marital Status                    Unknown            Admission Date Admission Type Admitting Provider Attending Provider Department, Room/Bed     22 Emergency Ramiro Bender MD Walton, Ross, MD [facility] EMERGENCY ROOM, 15/15     Discharge Date Discharge Disposition Discharge Destination                                       Attending Provider: Dre Marie MD    Allergies: Shellfish Allergy, Shellfish-derived Products, Penicillins    Isolation: None   Infection: None   Code Status: CPR   Advance Care Planning Activity    Ht: 172.7 cm (68\")   Wt: 88.3 kg (194 lb 10.7 oz)    Admission Cmt: None   Principal Problem: None                       Active Insurance as of 2022              Primary Coverage              Payor Plan Insurance Group Employer/Plan Group      ANTHEM BLUE CROSS [employer] BLUE [employer]OSS [employer] PPO [id number]                Payor Plan Address Payor Plan Phone Number Payor Plan Fax Number Effective Dates      [address] [phone]   2018 - None Entered      [place] [address]                    Subscriber Name Subscriber Birth Date Member ID           JEAN PIERRE DAI 1960 [id number]                               Emergency Contacts       (Rel.) Home Phone Work Phone Mobile Phone     VARGAS DAI (Spouse) -- -- [phone]                Veloz: NPI [id number] Tax ID [id number]          History & Physical           Ramiro Bender MD at 22 [address] [place] [address] HISTORY AND PHYSICAL  Date: 2022   Patient Name: Jean Pierre Dai  : 1960  MRN: [id number]  Primary Care Physician:  Delfin Bhandari DO  Date of admission: 2022     Subjective   Subjective      Chief Complaint: Melena     HPI:     Jean Pierre Dai is a 62 y.o. female past medical history of coronary artery disease of stenting, GERD, " dyslipidemia, and obesity status post gastric bypass who presents with 4 to 5 days of melena and weakness     Patient had gastric bypass about 4 years ago she states.  She is done relatively well since that time.  Notes that she is feeling kind of poor about 8 days ago with some diarrhea.  Then on Friday of last week the patient developed melena and some extreme weakness.  Continued to have melena over the last several days and as result came to the ER for further evaluation.  She does have occasional chest pain with activity.  Not sure what her baseline hemoglobin is.  Had no fevers.  No chills.  No bright red blood per rectum.  Result of all she came to the ER for further evaluation.     In the emergency department the patient's vital signs are as follows: Her temperature is 98, heart rate is 104, blood pressure 100/66, 98% on room air.  CMP shows a creatinine 1.67 and a glucose of 137.  CBC shows a white blood cell count 9.8 and a Hemoglobin of 7.5 with no baseline.  CT the abdomen pelvis showed no cause of bleeding.  Gastroenterology saw the patient in the ER and stated that they would do an EGD tomorrow to assess for melena.  Patient will be admitted for acute blood loss anemia.     All systems reviewed abnormalities noted above     Personal History      Past Medical History:     Anxiety/depression  Coronary artery disease status post stenting  GERD  Hyperlipidemia  Hypertension  Irritable bowel syndrome  Obesity status post gastric bypass     Past Surgical History:   section  Colonoscopy  Left heart cath  Hysterectomy  Laparoscopic gastric banding in   Tonsillectomy     Family History:   Osteoarthritis, diabetes, and heart disease in her family     Social History:   Quit smoking a 15 years ago  Social drinker     Home Medications:  aspirin, buPROPion XL, cetirizine, cyclobenzaprine, estradiol, ezetimibe-simvastatin, fluticasone, irbesartan-hydrochlorothiazide, isosorbide mononitrate, nebivolol,  nitroglycerin, omeprazole, and ticagrelor     Allergies:        Allergies   Allergen Reactions   • Shellfish Allergy Anaphylaxis   • Shellfish-Derived Products Anaphylaxis       Throat swelling   • Penicillins Unknown - High Severity       As baby                   Objective   Objective      Vitals:   Temp:  [98 °F (36.7 °C)] 98 °F (36.7 °C)  Heart Rate:  [104] 104  Resp:  [18] 18  BP: (100)/(66) 100/66     Physical Exam                Constitutional: Awake, alert, no acute distress          Eyes: Pupils equal, sclerae anicteric, no conjunctival injection          HENT: NCAT, mucous membranes moist          Neck: Supple, no thyromegaly, no lymphadenopathy, trachea midline          Respiratory: Clear to auscultation bilaterally, nonlabored respirations           Cardiovascular: RRR, no murmurs, rubs, or gallops, palpable pedal pulses bilaterally          Gastrointestinal: Mild abdominal pain.  No rebounding.  No guarding.          Musculoskeletal: No bilateral ankle edema, no clubbing or cyanosis to extremities          Psychiatric: Appropriate affect, cooperative          Neurologic: Oriented x 3, strength symmetric in all extremities, Cranial Nerves grossly intact to confrontation, speech clear          Skin: No rashes      Result Review    Result Review:  I have personally reviewed the results from the time of this admission to 2/28/2022 13:56 EST and agree with these findings:  Hemoglobin 7.5  CT scan shows no abnormal findings that would contribute to blood loss        Assessment/Plan   Assessment / Plan      Assessment/Plan:   Melena concern for upper GI bleed  Acute blood loss anemia with hemoglobin of 7.5  Coronary artery disease with previous stenting on Brilinta  Hypertension  Dyslipidemia  GERD     Plan:  --Admit to hospital service  --Continue Protonix drip  --Consult gastroenterology  --Hold Brilinta and aspirin (intervention was 4 years ago so this should be safe)  --Every 6 hour hemoglobin  --2 large  IV  --Transfuse for less than 7  --Normal saline 100 mils per hour  --Blood pressure medication  --Clear liquid diet  --N.p.o. at midnight     DVT prophylaxis:  SCDs     CODE STATUS:     Full code     Admission Status:  I believe this patient meets admission status.     Electronically signed by Ramiro Bender MD, 02/28/22, 1:56 PM EST.                 Electronically signed by Ramiro Bender MD at 02/28/22 1443                 Emergency Department Notes           Dre Marie MD at 02/28/22 1135             Subjective   Patient presents complaining of abdominal pain and dark stools for the last 8 days.  She is presently describes the pain is mild she describes it as gradual in onset and without exacerbating or alleviating factors.  Pain is somewhat migratory in nature.  She has had some intermittent vomiting is been no fever.  She does have a history of gastric bypass dating back to 2018.              Review of Systems   Constitutional: Negative for chills and fever.   HENT: Negative for congestion, ear pain and sore throat.    Eyes: Negative for pain.   Respiratory: Negative for cough, chest tightness and shortness of breath.    Cardiovascular: Negative for chest pain.   Gastrointestinal: Positive for abdominal pain, diarrhea and vomiting. Negative for nausea.   Genitourinary: Negative for flank pain and hematuria.   Musculoskeletal: Negative for joint swelling.   Skin: Negative for pallor.   Neurological: Negative for seizures and headaches.   All other systems reviewed and are negative.             Past Medical History:   Diagnosis Date   • Angina at rest (HCC)     • Anxiety     • Bursitis of left shoulder 03/26/2018   • CAD (coronary artery disease) 04/20/2015   • Chronic allergic rhinitis     • DDD (degenerative disc disease), lumbar 02/22/2016   • GERD (gastroesophageal reflux disease) 02/22/2016   • History of coronary artery stent placement     • HLD (hyperlipidemia)     • HTN (hypertension)     • IBS (irritable  bowel syndrome) 2016   • MDD (major depressive disorder)     • Nondisplaced fracture of proximal end of right fibula 2017   • OA (osteoarthritis)     • Obesity (BMI 30-39.9)     • Seasonal allergies                 Allergies   Allergen Reactions   • Shellfish Allergy Anaphylaxis   • Shellfish-Derived Products Anaphylaxis       Throat swelling   • Penicillins Unknown - High Severity       As baby                   Past Surgical History:   Procedure Laterality Date   • CARDIAC SURGERY       •  SECTION      • COLONOSCOPY       • CORONARY ANGIOPLASTY WITH STENT PLACEMENT       • EYE SURGERY         Implant    • HYSTERECTOMY      • LAPAROSCOPIC GASTRIC BANDING      • TONSILLECTOMY                   Family History   Problem Relation Age of Onset   • Heart disease Mother     • Diabetes Mother           Unspecified   • Arthritis Mother     • Osteoporosis Mother     • Heart disease Father     • Diabetes Father           Unspecified   • Arthritis Father     • Stroke Sister     • Heart disease Other     • Diabetes Other           Social History            Socioeconomic History   • Marital status:    Tobacco Use   • Smoking status: Former Smoker   • Smokeless tobacco: Never Used   • Tobacco comment: QUIT 15 YEARS AGO   Vaping Use   • Vaping Use: Never used   Substance and Sexual Activity   • Alcohol use: Yes       Comment: Occ; has been drinking for 31 or more years   • Drug use: Never               Objective   Physical Exam  Constitutional:       Appearance: Normal appearance.   HENT:      Head: Normocephalic and atraumatic.      Nose: Nose normal.      Mouth/Throat:      Mouth: Mucous membranes are moist.   Eyes:      Extraocular Movements: Extraocular movements intact.      Conjunctiva/sclera: Conjunctivae normal.      Pupils: Pupils are equal, round, and reactive to light.   Cardiovascular:      Rate and Rhythm: Normal rate and regular rhythm.      Pulses: Normal pulses.      Heart  sounds: Normal heart sounds.   Pulmonary:      Effort: Pulmonary effort is normal.      Breath sounds: Normal breath sounds. No wheezing.   Abdominal:      Palpations: Abdomen is soft.      Tenderness: There is no abdominal tenderness.   Musculoskeletal:         General: Normal range of motion.      Cervical back: Normal range of motion and neck supple.      Right lower leg: No edema.      Left lower leg: No edema.   Skin:     General: Skin is warm and dry.      Capillary Refill: Capillary refill takes less than 2 seconds.      Findings: No rash.   Neurological:      General: No focal deficit present.      Mental Status: She is alert and oriented to person, place, and time. Mental status is at baseline.      Cranial Nerves: No cranial nerve deficit.      Sensory: No sensory deficit.      Motor: No weakness.   Psychiatric:         Mood and Affect: Mood normal.         Behavior: Behavior normal.            Procedures              ED Course                                                       MDM  Number of Diagnoses or Management Options  Diagnosis management comments: Patient presents complaining of abdominal discomfort, weakness and dark stools.  Old records reviewed she said prior visits related to cardiac issues.  Differential considerations today include GI bleed from diverticulosis or gastric bleeding.  Hemoglobin 7.5 chemistries unremarkable except for a BUN of 27.  CT scan of the abdomen pelvis is read by radiology and reviewed by me does not demonstrate obvious source of bleeding.  ED course GI consultation is obtained Protonix is ordered patient is admitted to medicine service following medicine consultation.  She is hemodynamically stable with type and screen ordered.        Amount and/or Complexity of Data Reviewed  Clinical lab tests: reviewed  Tests in the radiology section of CPT®: reviewed     Risk of Complications, Morbidity, and/or Mortality  Presenting problems: high  Management options:  low     Patient Progress  Patient progress: stable        Final diagnoses:   Gastrointestinal hemorrhage, unspecified gastrointestinal hemorrhage type         ED Disposition         ED Disposition      ED Disposition Condition Comment     Decision to Admit                 No follow-up provider specified.         Medication List       No changes were made to your prescriptions during this visit.            Dre Marie MD  02/28/22 1400        Electronically signed by Dre Marie MD at 02/28/22 1400                      Facility-Administered Medications as of 2/28/2022   Medication Dose Route Frequency Provider Last Rate Last Admin   • acetaminophen (TYLENOL) tablet 650 mg  650 mg Oral Q4H PRN Ramiro Bender MD       • [COMPLETED] iopamidol (ISOVUE-370) 76 % injection 100 mL  100 mL Intravenous Once in imaging Dre aMrie MD   100 mL at 02/28/22 1219   • pantoprazole (PROTONIX) 40 mg in 100mL NS IVPB  8 mg/hr Intravenous Continuous Ramiro Bender MD       • sodium chloride 0.9 % flush 10 mL  10 mL Intravenous PRN Dre Marie MD       • sodium chloride 0.9 % flush 10 mL  10 mL Intravenous Q12H Ramiro Bender MD       • sodium chloride 0.9 % flush 10 mL  10 mL Intravenous PRN Ramiro Bender MD       • sodium chloride 0.9 % infusion  100 mL/hr Intravenous Continuous Ramiro Bender MD          Physician Progress Notes (last 24 hours)  Notes from 02/27/22 1517 through 02/28/22 1517   No notes of this type exist for this encounter.                    Consult Notes (last 24 hours)           Santana Cabrera MD at 02/28/22 1459             Consult Orders      1. Inpatient Gastroenterology Consult [[id number]] ordered by Ramiro Bender MD at 02/28/22 1429                    Chief Complaint  Abdominal Pain (Starting Friday), Rectal Bleeding, and Weakness - Generalized     History of Present Illness  Viviana Hodges is a 62 y.o. female who has history of gastric bypass surgery about 4 years ago.  She also have history of  coronary artery disease degenerative joint disease, GERD, hypertension, depression, osteoarthritis, obesity presents to [facility] EMERGENCY ROOM on referral from No ref. provider found for a gastroenterology evaluation of melena.  She has been having black tarry stools going on for 3 to 4 days she is slightly nauseated but no abdominal pain there is no history of nausea or vomiting.           Labs Result Review Imaging          Past Medical History:   Diagnosis Date   • Angina at rest (HCC)     • Anxiety     • Bursitis of left shoulder 2018   • CAD (coronary artery disease) 2015   • Chronic allergic rhinitis     • DDD (degenerative disc disease), lumbar 2016   • GERD (gastroesophageal reflux disease) 2016   • History of coronary artery stent placement     • HLD (hyperlipidemia)     • HTN (hypertension)     • IBS (irritable bowel syndrome) 2016   • MDD (major depressive disorder)     • Nondisplaced fracture of proximal end of right fibula 2017   • OA (osteoarthritis)     • Obesity (BMI 30-39.9)     • Seasonal allergies                 Past Surgical History:   Procedure Laterality Date   • CARDIAC SURGERY       •  SECTION      • COLONOSCOPY       • CORONARY ANGIOPLASTY WITH STENT PLACEMENT       • EYE SURGERY         Implant    • HYSTERECTOMY      • LAPAROSCOPIC GASTRIC BANDING      • TONSILLECTOMY                Current Facility-Administered Medications:   •  acetaminophen (TYLENOL) tablet 650 mg, 650 mg, Oral, Q4H PRN, Ramiro Bender MD  •  pantoprazole (PROTONIX) 40 mg in 100mL NS IVPB, 8 mg/hr, Intravenous, Continuous, Ramiro Bender MD  •  sodium chloride 0.9 % flush 10 mL, 10 mL, Intravenous, PRN, Dre Marie MD  •  sodium chloride 0.9 % flush 10 mL, 10 mL, Intravenous, Q12H, Ramiro Bender MD  •  sodium chloride 0.9 % flush 10 mL, 10 mL, Intravenous, PRN, Ramiro Bender MD  •  sodium chloride 0.9 % infusion, 100 mL/hr, Intravenous, Continuous, Napoleon  MD Ramiro     Current Outpatient Medications:   •  aspirin 81 MG chewable tablet, Chew 81 mg Daily., Disp: , Rfl:   •  buPROPion XL (WELLBUTRIN XL) 300 MG 24 hr tablet, TAKE 1 TABLET BY MOUTH  DAILY, Disp: 90 tablet, Rfl: 3  •  cetirizine (zyrTEC) 10 MG tablet, TAKE 1 TABLET DAILY, Disp: 90 tablet, Rfl: 3  •  estradiol (VIVELLE-DOT) 0.1 MG/24HR patch, Place 1 patch on the skin as directed by provider 2 (Two) Times a Week., Disp: , Rfl:   •  ezetimibe-simvastatin (VYTORIN) 10-40 MG per tablet, Take 1 tablet by mouth Every Night., Disp: , Rfl:   •  irbesartan-hydrochlorothiazide (AVALIDE) 300-12.5 MG tablet, TAKE 1 TABLET DAILY (NEED APPOINTMENT), Disp: , Rfl:   •  isosorbide mononitrate (IMDUR) 30 MG 24 hr tablet, Take 30 mg by mouth Daily., Disp: , Rfl:   •  nebivolol (Bystolic) 5 MG tablet, Take 5 mg by mouth Daily., Disp: , Rfl:   •  ticagrelor (Brilinta) 90 MG tablet tablet, Take 90 mg by mouth 2 (Two) Times a Day., Disp: , Rfl:   •  cyclobenzaprine (FLEXERIL) 10 MG tablet, TAKE 1 TABLET THREE TIMES A DAY FOR BACK PAIN (Patient taking differently: Take 10 mg by mouth 3 (Three) Times a Day As Needed.), Disp: 270 tablet, Rfl: 3  •  fluticasone (FLONASE) 50 MCG/ACT nasal spray, USE 2 SPRAYS IN EACH NOSTRIL ONCE DAILY AS NEEDED (Patient taking differently: 2 sprays into the nostril(s) as directed by provider Daily.), Disp: 48 g, Rfl: 3  •  nitroglycerin (NITROSTAT) 0.4 MG SL tablet, Place 0.4 mg under the tongue Every 5 (Five) Minutes As Needed for Chest Pain. Take no more than 3 doses in 15 minutes., Disp: , Rfl:             Allergies   Allergen Reactions   • Shellfish Allergy Anaphylaxis   • Shellfish-Derived Products Anaphylaxis       Throat swelling   • Penicillins Unknown - High Severity       As baby                   Family History   Problem Relation Age of Onset   • Heart disease Mother     • Diabetes Mother           Unspecified   • Arthritis Mother     • Osteoporosis Mother     • Heart disease Father     •  "Diabetes Father           Unspecified   • Arthritis Father     • Stroke Sister     • Heart disease Other     • Diabetes Other           Social History          Social History Narrative   • Not on file      Social history neck smoking, alcohol use, drugs  Immunization:       Immunization History   Administered Date(s) Administered   • Hepatitis A 04/27/2018   • Influenza, Unspecified 02/21/2020         Objective      Review of Systems 10 system review is negative except was mentioned HPI     Vital Signs:   /66 (BP Location: Right arm, Patient Position: Sitting)   Pulse 104   Temp 98 °F (36.7 °C) (Oral)   Resp 18   Ht 172.7 cm (68\")   Wt 88.3 kg (194 lb 10.7 oz)   SpO2 98%   BMI 29.60 kg/m²        Physical Exam  Constitutional:       General: She is awake. She is not in acute distress.     Appearance: Normal appearance. She is well-developed and well-groomed.   HENT:      Head: Normocephalic and atraumatic.      Mouth/Throat:      Mouth: Mucous membranes are moist.      Comments: Dorothy dental hygiene is good  Eyes:      General: Lids are normal.      Conjunctiva/sclera: Conjunctivae normal.      Pupils: Pupils are equal, round, and reactive to light.   Neck:      Thyroid: No thyroid mass.      Trachea: Trachea normal.   Cardiovascular:      Rate and Rhythm: Normal rate and regular rhythm.      Heart sounds: Normal heart sounds.   Pulmonary:      Effort: Pulmonary effort is normal.      Breath sounds: Normal breath sounds and air entry.   Abdominal:      General: Abdomen is flat. Bowel sounds are normal. There is no distension.      Palpations: Abdomen is soft. There is no mass.      Tenderness: There is no abdominal tenderness. There is no guarding.   Musculoskeletal:      Cervical back: Neck supple.      Right lower leg: No edema.      Left lower leg: No edema.   Skin:     General: Skin is warm and moist.      Coloration: Skin is not cyanotic, jaundiced or pale.      Findings: No rash.      Nails: There is " no clubbing.   Neurological:      Mental Status: She is alert and oriented to person, place, and time.   Psychiatric:         Attention and Perception: Attention normal.         Mood and Affect: Mood and affect normal.         Speech: Speech normal.            Labs:       Results from last 7 days   Lab Units 22  1104   WBC 10*3/mm3 9.18   HEMOGLOBIN g/dL 7.5*   HEMATOCRIT % 23.1*   PLATELETS 10*3/mm3 267           Results from last 7 days   Lab Units 22  1104   SODIUM mmol/L 139   POTASSIUM mmol/L 4.3   CHLORIDE mmol/L 106   CO2 mmol/L 23.9   BUN mg/dL 27*   CREATININE mg/dL 0.67   CALCIUM mg/dL 8.2*   BILIRUBIN mg/dL 0.2   ALK PHOS U/L 63   ALT (SGPT) U/L 22   AST (SGOT) U/L 24   GLUCOSE mg/dL 137*              Assessment & Plan:  Active Problems:    Gastrointestinal hemorrhage     Anemia secondary to acute GI blood loss     Plan upper endoscopy risk-benefit nature indication ratable procedure been discussed with patient  Patient was given instructions and counseling regarding her condition or for health maintenance advice. Please see specific information pulled into the AVS if appropriate.          Signed:  Edilberto Cabrera MD  22  14:59 EST           Electronically signed by Santana Cabrera MD at 22 1500                                                                      Physician Progress Notes (last 24 hours)      Sandeep Sheppard MD at 22 1741           [facility]  Date: 3/1/2022  Patient Name: Viviana Hodges  : 1960  MRN: [id number]  Date of admission: 2022      Subjective   Subjective     Chief Complaint: Melena    Summary: 62 y.o. female past medical history of coronary artery disease of stenting, GERD, dyslipidemia, and obesity status post gastric bypass who presents with 4 to 5 days of melena and weakness     Patient had gastric bypass about 4 years ago she states.  She is done relatively well since that time.   Notes that she is feeling kind of poor about 8 days ago with some diarrhea.  Then on Friday of last week the patient developed melena and some extreme weakness.  Continued to have melena over the last several days and as result came to the ER for further evaluation.  She does have occasional chest pain with activity.  Not sure what her baseline hemoglobin is.  Had no fevers.  No chills.  No bright red blood per rectum.  Result of all she came to the ER for further evaluation.     In the emergency department the patient's vital signs are as follows: Her temperature is 98, heart rate is 104, blood pressure 100/66, 98% on room air.  CMP shows a creatinine 1.67 and a glucose of 137.  CBC shows a white blood cell count 9.8 and a Hemoglobin of 7.5 with no baseline.  CT the abdomen pelvis showed no cause of bleeding.  Gastroenterology saw the patient in the ER and stated that they would do an EGD tomorrow to assess for melena.  Patient will be admitted for acute blood loss anemia.     Interval Followup: No acute events overnight.  She did not have any further melena that she noticed since admission.  She did require PRBC transfusion after hemoglobin dropped on admission.  She states she does feel little better after the blood transfusion.     Review of Systems   All systems reviewed and negative unless otherwise stated under interval follow-up    Objective   Objective     Vitals:   Temp:  [97.7 °F (36.5 °C)-98.4 °F (36.9 °C)] 98.1 °F (36.7 °C)  Heart Rate:  [56-84] 64  Resp:  [13-20] 16  BP: ()/() 95/54  Physical Exam    Constitutional: Awake, alert, no acute distress   Eyes: Pupils equal, sclerae anicteric, no conjunctival injection   HENT: NCAT, mucous membranes moist   Neck: Supple, no thyromegaly, no lymphadenopathy, trachea midline   Respiratory: Clear to auscultation bilaterally, nonlabored respirations    Cardiovascular: RRR, no murmurs, rubs, or gallops, palpable pedal pulses  bilaterally   Gastrointestinal: Positive bowel sounds, soft, nontender, nondistended   Musculoskeletal: No bilateral ankle edema, no clubbing or cyanosis to extremities   Psychiatric: Appropriate affect, cooperative   Neurologic: Oriented x 3, strength symmetric in all extremities, Cranial Nerves grossly intact to confrontation, speech clear   Skin: No rashes     Result Review    Result Review:  I have personally reviewed the results from the time of this admission to 3/1/2022 17:41 EST and agree with these findings:  [x]  Laboratory sodium 140, potassium 4.0, creatinine 0.56, calcium 7.9, WBC 7, hemoglobin 7.4, platelets 214  []  Microbiology  []  Radiology  [x]  EKG/Telemetry telemetry reviewed showed normal sinus rhythm  []  Cardiology/Vascular   []  Pathology  []  Old records  []  Other:    Assessment/Plan   Assessment / Plan     Assessment/Plan:  Melena concern for upper GI bleed  Acute blood loss anemia with hemoglobin of 7.5  Coronary artery disease with previous stenting on Brilinta  Hypertension  Dyslipidemia  GERD     Continue to monitor in the hospital for management of the above  Gastroenterology consulted, appreciate assistance  Underwent EGD without any evidence of active bleeding  Continue Protonix drip  Plan for colonoscopy tomorrow  Bowel prep tonight, n.p.o. after midnight  Hold Brilinta and aspirin until deemed safe per GI  DC IV fluids  Continue appropriate home medications  Trend renal function and electrolytes with a.m. BMP  Trend Hgb and WBC with a.m. CBC     Discussed plan with RN, gastroenterology    DVT prophylaxis:  Mechanical DVT prophylaxis orders are present.    CODE STATUS:   Level Of Support Discussed With: Patient  Code Status (Patient has no pulse and is not breathing): CPR (Attempt to Resuscitate)  Medical Interventions (Patient has pulse or is breathing): Full Support        Electronically signed by Sandeep Ponce MD, 03/01/22, 5:41 PM EST.             Electronically signed by  Sandeep Sheppard MD at 03/01/22 7786        CONTACT   Tete SANTIAGO  UTILIZATION REVIEW    [facility]  [address] [address]  TAX ID [id number]  NP  [phone]       [phone]   [phone]

## 2022-03-02 NOTE — PLAN OF CARE
Goal Outcome Evaluation:  Plan of Care Reviewed With: patient        Progress: no change  Outcome Summary: Drinking mag cirate for bowel prep for colonoscopy today due to not being able to tolerate golytely. BM's remain dark at this time. Hgb and VS stable.    Kayla Campbell, RN

## 2022-03-03 ENCOUNTER — READMISSION MANAGEMENT (OUTPATIENT)
Dept: CALL CENTER | Facility: HOSPITAL | Age: 62
End: 2022-03-03

## 2022-03-03 VITALS
BODY MASS INDEX: 29.5 KG/M2 | HEART RATE: 66 BPM | RESPIRATION RATE: 16 BRPM | DIASTOLIC BLOOD PRESSURE: 68 MMHG | OXYGEN SATURATION: 98 % | TEMPERATURE: 98.6 F | SYSTOLIC BLOOD PRESSURE: 121 MMHG | WEIGHT: 194.67 LBS | HEIGHT: 68 IN

## 2022-03-03 LAB
BASOPHILS # BLD AUTO: 0.02 10*3/MM3 (ref 0–0.2)
BASOPHILS NFR BLD AUTO: 0.3 % (ref 0–1.5)
CYTO UR: NORMAL
DEPRECATED RDW RBC AUTO: 49.1 FL (ref 37–54)
EOSINOPHIL # BLD AUTO: 0.31 10*3/MM3 (ref 0–0.4)
EOSINOPHIL NFR BLD AUTO: 5.2 % (ref 0.3–6.2)
ERYTHROCYTE [DISTWIDTH] IN BLOOD BY AUTOMATED COUNT: 14.4 % (ref 12.3–15.4)
HCT VFR BLD AUTO: 22.5 % (ref 34–46.6)
HCT VFR BLD AUTO: 23.7 % (ref 34–46.6)
HGB BLD-MCNC: 7.4 G/DL (ref 12–15.9)
HGB BLD-MCNC: 7.8 G/DL (ref 12–15.9)
IMM GRANULOCYTES # BLD AUTO: 0.02 10*3/MM3 (ref 0–0.05)
IMM GRANULOCYTES NFR BLD AUTO: 0.3 % (ref 0–0.5)
LAB AP CASE REPORT: NORMAL
LAB AP CLINICAL INFORMATION: NORMAL
LAB AP SPECIAL STAINS: NORMAL
LYMPHOCYTES # BLD AUTO: 1.76 10*3/MM3 (ref 0.7–3.1)
LYMPHOCYTES NFR BLD AUTO: 29.4 % (ref 19.6–45.3)
MCH RBC QN AUTO: 31.9 PG (ref 26.6–33)
MCHC RBC AUTO-ENTMCNC: 32.9 G/DL (ref 31.5–35.7)
MCV RBC AUTO: 97 FL (ref 79–97)
MONOCYTES # BLD AUTO: 0.52 10*3/MM3 (ref 0.1–0.9)
MONOCYTES NFR BLD AUTO: 8.7 % (ref 5–12)
NEUTROPHILS NFR BLD AUTO: 3.36 10*3/MM3 (ref 1.7–7)
NEUTROPHILS NFR BLD AUTO: 56.1 % (ref 42.7–76)
NRBC BLD AUTO-RTO: 0 /100 WBC (ref 0–0.2)
PATH REPORT.FINAL DX SPEC: NORMAL
PATH REPORT.GROSS SPEC: NORMAL
PLATELET # BLD AUTO: 231 10*3/MM3 (ref 140–450)
PMV BLD AUTO: 10.4 FL (ref 6–12)
RBC # BLD AUTO: 2.32 10*6/MM3 (ref 3.77–5.28)
WBC NRBC COR # BLD: 5.99 10*3/MM3 (ref 3.4–10.8)

## 2022-03-03 PROCEDURE — 85018 HEMOGLOBIN: CPT | Performed by: INTERNAL MEDICINE

## 2022-03-03 PROCEDURE — 99239 HOSP IP/OBS DSCHRG MGMT >30: CPT | Performed by: INTERNAL MEDICINE

## 2022-03-03 PROCEDURE — 85025 COMPLETE CBC W/AUTO DIFF WBC: CPT | Performed by: INTERNAL MEDICINE

## 2022-03-03 PROCEDURE — 85014 HEMATOCRIT: CPT | Performed by: INTERNAL MEDICINE

## 2022-03-03 RX ORDER — PANTOPRAZOLE SODIUM 40 MG/1
40 TABLET, DELAYED RELEASE ORAL
Qty: 30 TABLET | Refills: 0 | Status: SHIPPED | OUTPATIENT
Start: 2022-03-04 | End: 2022-03-07 | Stop reason: DRUGHIGH

## 2022-03-03 RX ADMIN — BUPROPION HYDROCHLORIDE 300 MG: 150 TABLET, EXTENDED RELEASE ORAL at 08:19

## 2022-03-03 RX ADMIN — PANTOPRAZOLE SODIUM 40 MG: 40 TABLET, DELAYED RELEASE ORAL at 06:04

## 2022-03-03 RX ADMIN — TICAGRELOR 90 MG: 90 TABLET ORAL at 08:19

## 2022-03-03 RX ADMIN — ASPIRIN 81 MG CHEWABLE TABLET 81 MG: 81 TABLET CHEWABLE at 08:19

## 2022-03-03 NOTE — DISCHARGE SUMMARY
[facility]         HOSPITALIST  DISCHARGE SUMMARY    Patient Name: Viviana Hodges  : 1960  MRN: [id number]    Date of Admission: 2022  Date of Discharge: 3/3/2022  Primary Care Physician: Delfin Bhandari DO    Consults     Date and Time Order Name Status Description    2022  2:29 PM Inpatient Gastroenterology Consult Completed     2022  1:49 PM Hospitalist (on-call MD unless specified)      2022 12:54 PM Gastroenterology (on-call MD unless specified)            Active and Resolved Hospital Problems:  Active Hospital Problems    Diagnosis POA   • Gastrointestinal hemorrhage [K92.2] Yes      Resolved Hospital Problems   No resolved problems to display.   Melena concern for upper GI bleed  Acute blood loss anemia with hemoglobin of 7.5  Unidentified cause of GI bleed, possible unseen ulcer versus angiectasia not visualized  Coronary artery disease with previous stenting on Brilinta and aspirin for life  Hypertension  Dyslipidemia  GERD    Hospital Course     Hospital Course:  Viviana Hodges is a 62 y.o. female past medical history of coronary artery disease of stenting, GERD, dyslipidemia, and obesity status post gastric bypass who presents with 4 to 5 days of melena and weakness     Patient had gastric bypass about 4 years ago she states.  She is done relatively well since that time.  Notes that she is feeling kind of poor about 8 days ago with some diarrhea.  Then on Friday of last week the patient developed melena and some extreme weakness.  Continued to have melena over the last several days and as result came to the ER for further evaluation.  In the ER, she was found to have active melena with a hemoglobin of 7.5 with no prior baseline.  She was admitted for further care, started on a PPI drip.  Hemoglobin did drop below 7 requiring 1 unit PRBCs.  Gastroenterology was consulted and performed an EGD which was unrevealing for source of bleeding.  She  underwent a colonoscopy that also did not show evidence of active bleeding.  Fortunately, her hemoglobin stabilized after her initial transfusion.  DAPT was restarted during her hospital stay and her hemoglobin remained stable with no further bleeding.  Is possible she had an angiectasia stop bleeding versus an unseen ulcer in the setting of gastric bypass.  For that reason, GI does recommend that she follow-up with her bariatric surgeon for possible visualization of her surgery should she continue to have issues with bleeding.  She was discharged home in stable condition on 3/3/2022.  Recommend follow-up with PCP in 1 week, recommend recheck CBC at that time.  Recommend follow-up with GI and bariatric surgery within 1 month.    DISCHARGE Follow Up Recommendations for labs and diagnostics: CBC in 1 week      Day of Discharge     Vital Signs:  Temp:  [97.5 °F (36.4 °C)-98.6 °F (37 °C)] 98.6 °F (37 °C)  Heart Rate:  [59-93] 66  Resp:  [15-18] 16  BP: (106-130)/(59-78) 121/68  Physical Exam:   Gen: NAD, WDWN  ENT: PERRL, EOMI   CV: RRR no MRG  Pulm: CTAB, no w/r/r  GI: Abd soft, NTND, +bs  Neuro: Moving all extremities spontaneously, CN II-XII grossly intact   Psych: A&O*3, normal mood and affect  Skin: No lesions or rashes noted      Discharge Details        Discharge Medications      New Medications      Instructions Start Date   pantoprazole 40 MG EC tablet  Commonly known as: PROTONIX   40 mg, Oral, Every Early Morning   Start Date: March 4, 2022        Changes to Medications      Instructions Start Date   cyclobenzaprine 10 MG tablet  Commonly known as: FLEXERIL  What changed: See the new instructions.   TAKE 1 TABLET THREE TIMES A DAY FOR BACK PAIN      fluticasone 50 MCG/ACT nasal spray  Commonly known as: FLONASE  What changed: See the new instructions.   USE 2 SPRAYS IN EACH NOSTRIL ONCE DAILY AS NEEDED         Continue These Medications      Instructions Start Date   aspirin 81 MG chewable tablet   81 mg,  Oral, Daily      Brilinta 90 MG tablet tablet  Generic drug: ticagrelor   90 mg, Oral, 2 Times Daily      buPROPion  MG 24 hr tablet  Commonly known as: WELLBUTRIN XL   TAKE 1 TABLET BY MOUTH  DAILY      Bystolic 5 MG tablet  Generic drug: nebivolol   5 mg, Oral, Daily      cetirizine 10 MG tablet  Commonly known as: zyrTEC   TAKE 1 TABLET DAILY      estradiol 0.1 MG/24HR patch  Commonly known as: VIVELLE-DOT   1 patch, Transdermal, 2 Times Weekly      ezetimibe-simvastatin 10-40 MG per tablet  Commonly known as: VYTORIN   1 tablet, Oral, Nightly      irbesartan-hydrochlorothiazide 300-12.5 MG tablet  Commonly known as: AVALIDE   TAKE 1 TABLET DAILY (NEED APPOINTMENT)      isosorbide mononitrate 30 MG 24 hr tablet  Commonly known as: IMDUR   30 mg, Oral, Daily      nitroglycerin 0.4 MG SL tablet  Commonly known as: NITROSTAT   0.4 mg, Sublingual, Every 5 Minutes PRN, Take no more than 3 doses in 15 minutes.              Allergies   Allergen Reactions   • Shellfish Allergy Anaphylaxis   • Shellfish-Derived Products Anaphylaxis     Throat swelling   • Penicillins Unknown - High Severity     As baby          Discharge Disposition:  Home or Self Care    Diet:  Hospital:  Diet Order   Procedures   • Diet Regular       Discharge Activity:   Activity Instructions     Activity as Tolerated            CODE STATUS:  Code Status and Medical Interventions:   Ordered at: 02/28/22 1420     Level Of Support Discussed With:    Patient     Code Status (Patient has no pulse and is not breathing):    CPR (Attempt to Resuscitate)     Medical Interventions (Patient has pulse or is breathing):    Full Support         No future appointments.    Additional Instructions for the Follow-ups that You Need to Schedule     Discharge Follow-up with PCP   As directed       Currently Documented PCP:    Delfin Bhandari DO    PCP Phone Number:    [phone]     Follow Up Details: 3-5 days         Discharge Follow-up with Specified Provider:  Bariatric surgeon; 3 Weeks   As directed      To: Bariatric surgeon    Follow Up: 3 Weeks         Discharge Follow-up with Specified Provider: Gastroenterology; 1 Month   As directed      To: Gastroenterology    Follow Up: 1 Month               Pertinent  and/or Most Recent Results     PROCEDURES:   EGD and colonoscopy    LAB RESULTS:      Lab 03/03/22  1134 03/03/22  0434 03/02/22  0817 03/02/22  0247 03/01/22 2011 03/01/22  1425 03/01/22  1425   WBC  --  5.99 8.27 8.39 7.66  --  7.01   HEMOGLOBIN 7.8* 7.4* 7.9* 7.7* 7.3*   < > 7.4*   HEMATOCRIT 23.7* 22.5* 24.3* 23.3* 21.7*   < > 22.1*   PLATELETS  --  231 234 238 203  --  214   NEUTROS ABS  --  3.36 4.88 4.47 3.89  --  4.11   IMMATURE GRANS (ABS)  --  0.02 0.03 0.04 0.03  --  0.02   LYMPHS ABS  --  1.76 2.29 2.98 2.94  --  2.46   MONOS ABS  --  0.52 0.72 0.55 0.54  --  0.25   EOS ABS  --  0.31 0.32 0.32 0.23  --  0.14   MCV  --  97.0 95.7 94.3 94.3  --  94.8    < > = values in this interval not displayed.         Lab 03/02/22  0247 03/01/22  1056 03/01/22  0230 02/28/22  1104   SODIUM  --  140  --  139   POTASSIUM  --  4.0  --  4.3   CHLORIDE  --  111*  --  106   CO2  --  21.8*  --  23.9   ANION GAP  --  7.2  --  9.1   BUN  --  14  --  27*   CREATININE  --  0.56*  --  0.67   EGFR  --  104.6  --  100.2   GLUCOSE  --  100*  --  137*   CALCIUM  --  7.9*  --  8.2*   MAGNESIUM 2.5*  --  2.0  --    PHOSPHORUS 3.2  --   --   --          Lab 03/01/22  1056 02/28/22  1104   TOTAL PROTEIN 4.6* 5.4*   ALBUMIN 3.00* 3.70   GLOBULIN 1.6 1.7   ALT (SGPT) 17 22   AST (SGOT) 18 24   BILIRUBIN 0.2 0.2   ALK PHOS 51 63                 Lab 02/28/22  1444 02/28/22  1104 02/28/22  1104   ABO TYPING O   < > O   RH TYPING Positive   < > Positive   ANTIBODY SCREEN  --   --  Negative    < > = values in this interval not displayed.         Brief Urine Lab Results  (Last result in the past 365 days)      Color   Clarity   Blood   Leuk Est   Nitrite   Protein   CREAT   Urine HCG         03/01/22 1314               Negative           Microbiology Results (last 10 days)     ** No results found for the last 240 hours. **                 CT Abdomen Pelvis With Contrast    Result Date: 2/28/2022  Narrative: PROCEDURE: CT ABDOMEN PELVIS W CONTRAST  COMPARISON: [facility], CT, ABD/PELVIS STONE PROTOCOL W/O, 2/22/2010, 12:30.  INDICATIONS: abd pain with blood in stool x 8 days  PROTOCOL:   Standard imaging protocol performed    RADIATION:   DLP: 938.6mGy*cm   Automated exposure control was utilized to minimize radiation dose. CONTRAST: 100cc Isovue 370 I.V.  TECHNIQUE: Axial images of the abdomen and pelvis with intravenous contrast.  ABDOMEN:  The lung bases are clear.  The gastric lap band has been removed.  There has been interval gastric bypass procedure.  There are no inflammatory changes around the gallbladder.  The liver, spleen, pancreas, adrenal glands and right kidney are normal.  1.2 cm simple cyst in the lower pole of the left kidney.  PELVIS:  No evidence of bowel obstruction, perforation or abscess.  Prior hysterectomy.  The appendix and terminal ileum are normal.  No CT evidence of significant colitis.  Colonic diverticulosis is present without evidence of acute diverticulitis.  The abdominal aorta has a normal caliber.  Atherosclerotic disease is present.  No acute osseous abnormalities are identified.  IMPRESSION:  No acute findings.  The source of the GI bleeding is not identified on this examination.  GABINO STEIN MD       Electronically Signed and Approved By: GABINO STEIN MD on 2/28/2022 at 12:42                       Time spent on Discharge including face to face service:  33 minutes    Electronically signed by Sandeep Ponce MD, 03/03/22, 12:42 PM EST.

## 2022-03-04 ENCOUNTER — TRANSITIONAL CARE MANAGEMENT TELEPHONE ENCOUNTER (OUTPATIENT)
Dept: CALL CENTER | Facility: HOSPITAL | Age: 62
End: 2022-03-04

## 2022-03-04 NOTE — OUTREACH NOTE
Prep Survey      Responses   [facility] patient discharged from? [facility]   Is LACE score < 7 ? Yes   Emergency Room discharge w/ pulse ox? No   Eligibility TCM   Hospital [facility]   Date of Admission 02/28/22   Date of Discharge 03/03/22   Discharge Disposition Home or Self Care   Discharge diagnosis Gastrointestinal hemorrhage   Does the patient have one of the following disease processes/diagnoses(primary or secondary)? Other   Does the patient have Home health ordered? No   Is there a DME ordered? No   Prep survey completed? Yes          Becky Martins RN

## 2022-03-04 NOTE — OUTREACH NOTE
Call Center TCM Note      Responses   [facility] patient discharged from? [facility]   Does the patient have one of the following disease processes/diagnoses(primary or secondary)? Other   TCM attempt successful? Yes   Call start time [id number]   Call end time 0900   Discharge diagnosis Gastrointestinal hemorrhage   Medication alerts for this patient BRILINTA--bleeding precautions advised   Meds reviewed with patient/caregiver? Yes   Is the patient having any side effects they believe may be caused by any medication additions or changes? No   Is the patient taking all medications as directed (includes completed medication regime)? Yes   Comments regarding appointments Dr. Davis 4/7/22@0915am, reminded of need to make bariatric f/u appt   Does the patient have a primary care provider?  Yes   Does the patient have an appointment with their PCP within 7 days of discharge? Yes   Comments regarding PCP Hospital PCP FOLLOW UP APPOINTMENT IS 3/7/22@1115am   Has the patient kept scheduled appointments due by today? N/A   Has home health visited the patient within 72 hours of discharge? N/A   Psychosocial issues? No   Did the patient receive a copy of their discharge instructions? Yes   Nursing interventions Reviewed instructions with patient   What is the patient's perception of their health status since discharge? Improving  [Pt reports no further evidence of bleeding, she denies SOA and fatigue but endorses  expected weakness from stay in hospital and being in bed for a few days..  Understands to monitor for any concerns and notify MD. ]   Is the patient/caregiver able to teach back signs and symptoms related to disease process for when to call PCP? Yes   Is the patient/caregiver able to teach back signs and symptoms related to disease process for when to call 911? Yes   TCM call completed? Yes          Ellie Desouza RN    3/4/2022, 09:08 EST

## 2022-03-07 ENCOUNTER — LAB (OUTPATIENT)
Dept: LAB | Facility: HOSPITAL | Age: 62
End: 2022-03-07

## 2022-03-07 ENCOUNTER — OFFICE VISIT (OUTPATIENT)
Dept: FAMILY MEDICINE CLINIC | Facility: CLINIC | Age: 62
End: 2022-03-07

## 2022-03-07 VITALS
OXYGEN SATURATION: 99 % | WEIGHT: 195 LBS | HEIGHT: 68 IN | RESPIRATION RATE: 18 BRPM | TEMPERATURE: 97.5 F | BODY MASS INDEX: 29.55 KG/M2 | SYSTOLIC BLOOD PRESSURE: 130 MMHG | DIASTOLIC BLOOD PRESSURE: 71 MMHG | HEART RATE: 64 BPM

## 2022-03-07 DIAGNOSIS — I10 PRIMARY HYPERTENSION: Chronic | ICD-10-CM

## 2022-03-07 DIAGNOSIS — I25.10 ATHEROSCLEROSIS OF NATIVE CORONARY ARTERY OF NATIVE HEART WITHOUT ANGINA PECTORIS: Chronic | ICD-10-CM

## 2022-03-07 DIAGNOSIS — K25.9 STRESS ULCER OF STOMACH: ICD-10-CM

## 2022-03-07 DIAGNOSIS — F32.5 MAJOR DEPRESSIVE DISORDER WITH SINGLE EPISODE, IN FULL REMISSION: ICD-10-CM

## 2022-03-07 DIAGNOSIS — K92.2 GASTROINTESTINAL HEMORRHAGE, UNSPECIFIED GASTROINTESTINAL HEMORRHAGE TYPE: ICD-10-CM

## 2022-03-07 DIAGNOSIS — D50.0 IRON DEFICIENCY ANEMIA DUE TO CHRONIC BLOOD LOSS: ICD-10-CM

## 2022-03-07 DIAGNOSIS — E78.2 MIXED HYPERLIPIDEMIA: Chronic | ICD-10-CM

## 2022-03-07 DIAGNOSIS — J30.9 CHRONIC ALLERGIC RHINITIS: ICD-10-CM

## 2022-03-07 DIAGNOSIS — Z12.31 ENCOUNTER FOR SCREENING MAMMOGRAM FOR MALIGNANT NEOPLASM OF BREAST: ICD-10-CM

## 2022-03-07 DIAGNOSIS — R73.03 PREDIABETES: ICD-10-CM

## 2022-03-07 DIAGNOSIS — K21.9 GERD WITHOUT ESOPHAGITIS: Chronic | ICD-10-CM

## 2022-03-07 DIAGNOSIS — Z12.31 ENCOUNTER FOR SCREENING MAMMOGRAM FOR MALIGNANT NEOPLASM OF BREAST: Primary | ICD-10-CM

## 2022-03-07 PROBLEM — Z98.61 CORONARY ANGIOPLASTY STATUS: Status: ACTIVE | Noted: 2017-03-14

## 2022-03-07 PROBLEM — F32.9 MDD (MAJOR DEPRESSIVE DISORDER): Chronic | Status: ACTIVE | Noted: 2022-03-07

## 2022-03-07 PROBLEM — M19.90 OA (OSTEOARTHRITIS): Status: ACTIVE | Noted: 2022-03-07

## 2022-03-07 PROBLEM — F32.9 MDD (MAJOR DEPRESSIVE DISORDER): Status: ACTIVE | Noted: 2022-03-07

## 2022-03-07 PROBLEM — Z98.61 CORONARY ANGIOPLASTY STATUS: Chronic | Status: ACTIVE | Noted: 2017-03-14

## 2022-03-07 PROBLEM — E78.5 HLD (HYPERLIPIDEMIA): Status: ACTIVE | Noted: 2022-03-07

## 2022-03-07 LAB
ANION GAP SERPL CALCULATED.3IONS-SCNC: 9.1 MMOL/L (ref 5–15)
BUN SERPL-MCNC: 10 MG/DL (ref 8–23)
BUN/CREAT SERPL: 13.5 (ref 7–25)
CALCIUM SPEC-SCNC: 8.9 MG/DL (ref 8.6–10.5)
CHLORIDE SERPL-SCNC: 106 MMOL/L (ref 98–107)
CHOLEST SERPL-MCNC: 124 MG/DL (ref 0–200)
CO2 SERPL-SCNC: 23.9 MMOL/L (ref 22–29)
CREAT SERPL-MCNC: 0.74 MG/DL (ref 0.57–1)
DEPRECATED RDW RBC AUTO: 43.5 FL (ref 37–54)
EGFRCR SERPLBLD CKD-EPI 2021: 92.8 ML/MIN/1.73
ERYTHROCYTE [DISTWIDTH] IN BLOOD BY AUTOMATED COUNT: 13.1 % (ref 12.3–15.4)
GLUCOSE SERPL-MCNC: 90 MG/DL (ref 65–99)
HBA1C MFR BLD: 4.8 % (ref 4.8–5.6)
HCT VFR BLD AUTO: 25.4 % (ref 34–46.6)
HDLC SERPL-MCNC: 49 MG/DL (ref 40–60)
HGB BLD-MCNC: 8.2 G/DL (ref 12–15.9)
IRON 24H UR-MRATE: 24 MCG/DL (ref 37–145)
IRON SATN MFR SERPL: 6 % (ref 20–50)
LDLC SERPL CALC-MCNC: 60 MG/DL (ref 0–100)
LDLC/HDLC SERPL: 1.24 {RATIO}
MCH RBC QN AUTO: 30 PG (ref 26.6–33)
MCHC RBC AUTO-ENTMCNC: 32.3 G/DL (ref 31.5–35.7)
MCV RBC AUTO: 93 FL (ref 79–97)
PLATELET # BLD AUTO: 399 10*3/MM3 (ref 140–450)
PMV BLD AUTO: 10.5 FL (ref 6–12)
POTASSIUM SERPL-SCNC: 4.7 MMOL/L (ref 3.5–5.2)
RBC # BLD AUTO: 2.73 10*6/MM3 (ref 3.77–5.28)
SODIUM SERPL-SCNC: 139 MMOL/L (ref 136–145)
TIBC SERPL-MCNC: 422 MCG/DL (ref 298–536)
TRANSFERRIN SERPL-MCNC: 283 MG/DL (ref 200–360)
TRIGL SERPL-MCNC: 71 MG/DL (ref 0–150)
VLDLC SERPL-MCNC: 15 MG/DL (ref 5–40)
WBC NRBC COR # BLD: 6.65 10*3/MM3 (ref 3.4–10.8)

## 2022-03-07 PROCEDURE — 80061 LIPID PANEL: CPT

## 2022-03-07 PROCEDURE — 83036 HEMOGLOBIN GLYCOSYLATED A1C: CPT

## 2022-03-07 PROCEDURE — 84466 ASSAY OF TRANSFERRIN: CPT

## 2022-03-07 PROCEDURE — 80048 BASIC METABOLIC PNL TOTAL CA: CPT

## 2022-03-07 PROCEDURE — 83540 ASSAY OF IRON: CPT

## 2022-03-07 PROCEDURE — 36415 COLL VENOUS BLD VENIPUNCTURE: CPT

## 2022-03-07 PROCEDURE — 99495 TRANSJ CARE MGMT MOD F2F 14D: CPT | Performed by: FAMILY MEDICINE

## 2022-03-07 PROCEDURE — 85027 COMPLETE CBC AUTOMATED: CPT

## 2022-03-07 RX ORDER — OMEPRAZOLE 40 MG/1
40 CAPSULE, DELAYED RELEASE ORAL 2 TIMES DAILY
Qty: 60 CAPSULE | Refills: 2 | Status: SHIPPED | OUTPATIENT
Start: 2022-03-07 | End: 2022-09-15 | Stop reason: HOSPADM

## 2022-03-07 NOTE — PROGRESS NOTES
Transitional Care Follow Up Visit  Subjective     Viviana Hodges is a 62 y.o. female who presents for a transitional care management visit.    Within 48 business hours after discharge our office contacted her via telephone to coordinate her care and needs.      I reviewed and discussed the details of that call along with the discharge summary, hospital problems, inpatient lab results, inpatient diagnostic studies, and consultation reports with Viviana.     Current outpatient and discharge medications have been reconciled for the patient.  Reviewed by: Delfin Bhandari DO      Date of TCM Phone Call 3/3/2022   [facility]   Date of Admission 2/28/2022   Date of Discharge 3/3/2022   Discharge Disposition Home or Self Care     Risk for Readmission (LACE) Score: 6 (3/3/2022  6:01 AM)      History of Present Illness   Patient was admitted from 2/28 and 3/3 for GI hemorrhage also for detailed below     Was recommended to follow-up on labs, recheck blood counts, she still admits fatigue but she wants to go back to work next week if able, has paperwork for her recent time away and denies any bleeding, dark stools or abdominal pain;.     She states the protonix she thinks is giving her a bad headache so she would like to change   to a different PPI if able like prilosec, has been on before.     She was advised to follow up with her gastric bypass surgeon additionally on her history of surgery which could be related to her recent bleeding issue.    Course During Hospital Stay:    Viviana Hodges is a 62 y.o. female past medical history of coronary artery disease of stenting, GERD, dyslipidemia, and obesity status post gastric bypass who presents with 4 to 5 days of melena and weakness     Patient had gastric bypass about 4 years ago she states.  She is done relatively well since that time.  Notes that she is feeling kind of poor about 8 days ago with some diarrhea.  Then on Friday of last week the patient  "developed melena and some extreme weakness.  Continued to have melena over the last several days and as result came to the ER for further evaluation.  In the ER, she was found to have active melena with a hemoglobin of 7.5 with no prior baseline.  She was admitted for further care, started on a PPI drip.  Hemoglobin did drop below 7 requiring 1 unit PRBCs.  Gastroenterology was consulted and performed an EGD which was unrevealing for source of bleeding.  She underwent a colonoscopy that also did not show evidence of active bleeding.  Fortunately, her hemoglobin stabilized after her initial transfusion.  DAPT was restarted during her hospital stay and her hemoglobin remained stable with no further bleeding.  Is possible she had an angiectasia stop bleeding versus an unseen ulcer in the setting of gastric bypass.  For that reason, GI does recommend that she follow-up with her bariatric surgeon for possible visualization of her surgery should she continue to have issues with bleeding.  She was discharged home in stable condition on 3/3/2022.  Recommend follow-up with PCP in 1 week, recommend recheck CBC at that time.  Recommend follow-up with GI and bariatric surgery within 1 month.     The following portions of the patient's history were reviewed and updated as appropriate: allergies, current medications, past family history, past medical history, past social history, past surgical history and problem list.    /71   Pulse 64   Temp 97.5 °F (36.4 °C)   Resp 18   Ht 172.7 cm (68\")   Wt 88.5 kg (195 lb)   SpO2 99%   Breastfeeding No   BMI 29.65 kg/m²      Blood Pressures during visit    03/07/22 1119   BP: 130/71       Objective   Physical Exam  Vitals reviewed.   Constitutional:       Appearance: Normal appearance. She is well-developed.      Comments: fatigue   HENT:      Head: Normocephalic and atraumatic.      Right Ear: External ear normal.      Left Ear: External ear normal.      Mouth/Throat:      " Pharynx: No oropharyngeal exudate.   Eyes:      Conjunctiva/sclera: Conjunctivae normal.      Pupils: Pupils are equal, round, and reactive to light.   Cardiovascular:      Rate and Rhythm: Normal rate.   Pulmonary:      Effort: Pulmonary effort is normal.   Abdominal:      General: Abdomen is flat. There is no distension.      Palpations: Abdomen is soft.   Skin:     General: Skin is warm and dry.   Neurological:      General: No focal deficit present.      Mental Status: She is alert and oriented to person, place, and time.   Psychiatric:         Mood and Affect: Mood and affect normal.         Behavior: Behavior normal.         Thought Content: Thought content normal.         Judgment: Judgment normal.               Recent Results (from the past 4032 hour(s))   Comprehensive Metabolic Panel    Collection Time: 02/28/22 11:04 AM    Specimen: Blood   Result Value Ref Range    Glucose 137 (H) 65 - 99 mg/dL    BUN 27 (H) 8 - 23 mg/dL    Creatinine 0.67 0.57 - 1.00 mg/dL    Sodium 139 136 - 145 mmol/L    Potassium 4.3 3.5 - 5.2 mmol/L    Chloride 106 98 - 107 mmol/L    CO2 23.9 22.0 - 29.0 mmol/L    Calcium 8.2 (L) 8.6 - 10.5 mg/dL    Total Protein 5.4 (L) 6.0 - 8.5 g/dL    Albumin 3.70 3.50 - 5.20 g/dL    ALT (SGPT) 22 1 - 33 U/L    AST (SGOT) 24 1 - 32 U/L    Alkaline Phosphatase 63 39 - 117 U/L    Total Bilirubin 0.2 0.0 - 1.2 mg/dL    Globulin 1.7 gm/dL    A/G Ratio 2.2 g/dL    BUN/Creatinine Ratio 40.3 (H) 7.0 - 25.0    Anion Gap 9.1 5.0 - 15.0 mmol/L    eGFR 100.2 >60.0 mL/min/1.73   Type & Screen    Collection Time: 02/28/22 11:04 AM    Specimen: Blood   Result Value Ref Range    ABO Type O     RH type Positive     Antibody Screen Negative     T&S Expiration Date 3/7/2022 11:59:00 PM    Green Top (Gel)    Collection Time: 02/28/22 11:04 AM   Result Value Ref Range    Extra Tube Hold for add-ons.    Lavender Top    Collection Time: 02/28/22 11:04 AM   Result Value Ref Range    Extra Tube hold for add-on    Gold  Top - SST    Collection Time: 02/28/22 11:04 AM   Result Value Ref Range    Extra Tube Hold for add-ons.    Light Blue Top    Collection Time: 02/28/22 11:04 AM   Result Value Ref Range    Extra Tube hold for add-on    CBC Auto Differential    Collection Time: 02/28/22 11:04 AM    Specimen: Blood   Result Value Ref Range    WBC 9.18 3.40 - 10.80 10*3/mm3    RBC 2.44 (L) 3.77 - 5.28 10*6/mm3    Hemoglobin 7.5 (L) 12.0 - 15.9 g/dL    Hematocrit 23.1 (L) 34.0 - 46.6 %    MCV 94.7 79.0 - 97.0 fL    MCH 30.7 26.6 - 33.0 pg    MCHC 32.5 31.5 - 35.7 g/dL    RDW 12.8 12.3 - 15.4 %    RDW-SD 44.2 37.0 - 54.0 fl    MPV 10.2 6.0 - 12.0 fL    Platelets 267 140 - 450 10*3/mm3    Neutrophil % 60.5 42.7 - 76.0 %    Lymphocyte % 30.9 19.6 - 45.3 %    Monocyte % 6.1 5.0 - 12.0 %    Eosinophil % 1.6 0.3 - 6.2 %    Basophil % 0.4 0.0 - 1.5 %    Immature Grans % 0.5 0.0 - 0.5 %    Neutrophils, Absolute 5.54 1.70 - 7.00 10*3/mm3    Lymphocytes, Absolute 2.84 0.70 - 3.10 10*3/mm3    Monocytes, Absolute 0.56 0.10 - 0.90 10*3/mm3    Eosinophils, Absolute 0.15 0.00 - 0.40 10*3/mm3    Basophils, Absolute 0.04 0.00 - 0.20 10*3/mm3    Immature Grans, Absolute 0.05 0.00 - 0.05 10*3/mm3    nRBC 0.0 0.0 - 0.2 /100 WBC   ABO RH Specimen Verification    Collection Time: 02/28/22  2:44 PM    Specimen: Arm, Right; Blood   Result Value Ref Range    ABO Type O     RH type Positive    CBC Auto Differential    Collection Time: 02/28/22  2:44 PM    Specimen: Arm, Right; Blood   Result Value Ref Range    WBC 12.50 (H) 3.40 - 10.80 10*3/mm3    RBC 2.19 (L) 3.77 - 5.28 10*6/mm3    Hemoglobin 6.7 (C) 12.0 - 15.9 g/dL    Hematocrit 20.6 (C) 34.0 - 46.6 %    MCV 94.1 79.0 - 97.0 fL    MCH 30.6 26.6 - 33.0 pg    MCHC 32.5 31.5 - 35.7 g/dL    RDW 12.9 12.3 - 15.4 %    RDW-SD 43.9 37.0 - 54.0 fl    MPV 10.6 6.0 - 12.0 fL    Platelets 240 140 - 450 10*3/mm3    Neutrophil % 80.5 (H) 42.7 - 76.0 %    Lymphocyte % 15.4 (L) 19.6 - 45.3 %    Monocyte % 3.4 (L) 5.0 -  12.0 %    Eosinophil % 0.2 (L) 0.3 - 6.2 %    Basophil % 0.2 0.0 - 1.5 %    Immature Grans % 0.3 0.0 - 0.5 %    Neutrophils, Absolute 10.05 (H) 1.70 - 7.00 10*3/mm3    Lymphocytes, Absolute 1.93 0.70 - 3.10 10*3/mm3    Monocytes, Absolute 0.43 0.10 - 0.90 10*3/mm3    Eosinophils, Absolute 0.02 0.00 - 0.40 10*3/mm3    Basophils, Absolute 0.03 0.00 - 0.20 10*3/mm3    Immature Grans, Absolute 0.04 0.00 - 0.05 10*3/mm3    nRBC 0.0 0.0 - 0.2 /100 WBC   Urinalysis With Culture If Indicated - Urine, Clean Catch    Collection Time: 02/28/22  2:45 PM    Specimen: Urine, Clean Catch   Result Value Ref Range    Color, UA Yellow Yellow, Straw    Appearance, UA Clear Clear    pH, UA 6.5 5.0 - 8.0    Specific Gravity, UA >1.030 (H) 1.005 - 1.030    Glucose, UA Negative Negative    Ketones, UA Negative Negative    Bilirubin, UA Negative Negative    Blood, UA Negative Negative    Protein, UA Negative Negative    Leuk Esterase, UA Negative Negative    Nitrite, UA Negative Negative    Urobilinogen, UA 0.2 E.U./dL 0.2 - 1.0 E.U./dL   Occult Blood, Fecal By Immunoassay - Stool, Per Rectum    Collection Time: 02/28/22  3:43 PM    Specimen: Per Rectum; Stool   Result Value Ref Range    Occult Blood, Fecal by Immunoassay Positive (A) Negative   CBC Auto Differential    Collection Time: 02/28/22  8:32 PM    Specimen: Arm, Right; Blood   Result Value Ref Range    WBC 11.57 (H) 3.40 - 10.80 10*3/mm3    RBC 2.03 (L) 3.77 - 5.28 10*6/mm3    Hemoglobin 6.3 (C) 12.0 - 15.9 g/dL    Hematocrit 19.2 (C) 34.0 - 46.6 %    MCV 94.6 79.0 - 97.0 fL    MCH 31.0 26.6 - 33.0 pg    MCHC 32.8 31.5 - 35.7 g/dL    RDW 13.2 12.3 - 15.4 %    RDW-SD 44.7 37.0 - 54.0 fl    MPV 10.3 6.0 - 12.0 fL    Platelets 238 140 - 450 10*3/mm3    Neutrophil % 63.2 42.7 - 76.0 %    Lymphocyte % 27.2 19.6 - 45.3 %    Monocyte % 8.6 5.0 - 12.0 %    Eosinophil % 0.3 0.3 - 6.2 %    Basophil % 0.3 0.0 - 1.5 %    Immature Grans % 0.4 0.0 - 0.5 %    Neutrophils, Absolute 7.31 (H) 1.70  - 7.00 10*3/mm3    Lymphocytes, Absolute 3.15 (H) 0.70 - 3.10 10*3/mm3    Monocytes, Absolute 0.99 (H) 0.10 - 0.90 10*3/mm3    Eosinophils, Absolute 0.03 0.00 - 0.40 10*3/mm3    Basophils, Absolute 0.04 0.00 - 0.20 10*3/mm3    Immature Grans, Absolute 0.05 0.00 - 0.05 10*3/mm3    nRBC 0.0 0.0 - 0.2 /100 WBC   CBC Auto Differential    Collection Time: 03/01/22  2:30 AM    Specimen: Blood   Result Value Ref Range    WBC 9.04 3.40 - 10.80 10*3/mm3    RBC 2.30 (L) 3.77 - 5.28 10*6/mm3    Hemoglobin 7.2 (L) 12.0 - 15.9 g/dL    Hematocrit 21.8 (L) 34.0 - 46.6 %    MCV 94.8 79.0 - 97.0 fL    MCH 31.3 26.6 - 33.0 pg    MCHC 33.0 31.5 - 35.7 g/dL    RDW 13.4 12.3 - 15.4 %    RDW-SD 45.4 37.0 - 54.0 fl    MPV 10.9 6.0 - 12.0 fL    Platelets 179 140 - 450 10*3/mm3    Neutrophil % 56.2 42.7 - 76.0 %    Lymphocyte % 35.3 19.6 - 45.3 %    Monocyte % 6.6 5.0 - 12.0 %    Eosinophil % 1.2 0.3 - 6.2 %    Basophil % 0.4 0.0 - 1.5 %    Immature Grans % 0.3 0.0 - 0.5 %    Neutrophils, Absolute 5.07 1.70 - 7.00 10*3/mm3    Lymphocytes, Absolute 3.19 (H) 0.70 - 3.10 10*3/mm3    Monocytes, Absolute 0.60 0.10 - 0.90 10*3/mm3    Eosinophils, Absolute 0.11 0.00 - 0.40 10*3/mm3    Basophils, Absolute 0.04 0.00 - 0.20 10*3/mm3    Immature Grans, Absolute 0.03 0.00 - 0.05 10*3/mm3    nRBC 0.0 0.0 - 0.2 /100 WBC   Magnesium    Collection Time: 03/01/22  2:30 AM    Specimen: Blood   Result Value Ref Range    Magnesium 2.0 1.6 - 2.4 mg/dL   Tissue Pathology Exam    Collection Time: 03/01/22 10:30 AM    Specimen: Gastric, Body; Tissue   Result Value Ref Range    Case Report       Surgical Pathology Report                         Case: [id number]                                  Authorizing Provider:  Santana Cabrera MD    Collected:           03/01/2022 10:30 AM          Ordering Location:     [facility] Received:            03/01/2022 11:38 AM                                 SUITES                                                                        Pathologist:           Allan French MD                                                            Specimen:    Gastric, Body, GASTRIC ANASTAMOSIS BIOPSY                                                  Clinical Information      Final Diagnosis       Stomach, anastomosis, biopsy:    - Gastric antrum mucosa with chronic active gastritis and focal intestinal metaplasia    - Negative for Helicobacter pylori on immunohistochemical stain    - Negative for dysplasia and malignancy          Gross Description       Gastric anastomosis biopsy: Multiple soft tissue fragments filtered and measuring 0.5 cm in greatest aggregate dimension.      Special Stains       An immunohistochemical stain for Helicobacter pylori was performed to aid in its detection since it was not readily identified on H&E stain. The control stains appropriately.        Microscopic Description     Comprehensive Metabolic Panel    Collection Time: 03/01/22 10:56 AM    Specimen: Arm, Right; Blood   Result Value Ref Range    Glucose 100 (H) 65 - 99 mg/dL    BUN 14 8 - 23 mg/dL    Creatinine 0.56 (L) 0.57 - 1.00 mg/dL    Sodium 140 136 - 145 mmol/L    Potassium 4.0 3.5 - 5.2 mmol/L    Chloride 111 (H) 98 - 107 mmol/L    CO2 21.8 (L) 22.0 - 29.0 mmol/L    Calcium 7.9 (L) 8.6 - 10.5 mg/dL    Total Protein 4.6 (L) 6.0 - 8.5 g/dL    Albumin 3.00 (L) 3.50 - 5.20 g/dL    ALT (SGPT) 17 1 - 33 U/L    AST (SGOT) 18 1 - 32 U/L    Alkaline Phosphatase 51 39 - 117 U/L    Total Bilirubin 0.2 0.0 - 1.2 mg/dL    Globulin 1.6 gm/dL    A/G Ratio 1.9 g/dL    BUN/Creatinine Ratio 25.0 7.0 - 25.0    Anion Gap 7.2 5.0 - 15.0 mmol/L    eGFR 104.6 >60.0 mL/min/1.73   CBC Auto Differential    Collection Time: 03/01/22 10:56 AM    Specimen: Arm, Right; Blood   Result Value Ref Range    WBC 6.61 3.40 - 10.80 10*3/mm3    RBC 2.26 (L) 3.77 - 5.28 10*6/mm3    Hemoglobin 7.0 (L) 12.0 - 15.9 g/dL    Hematocrit 21.1 (L) 34.0 - 46.6 %    MCV 93.4 79.0 - 97.0 fL    MCH  31.0 26.6 - 33.0 pg    MCHC 33.2 31.5 - 35.7 g/dL    RDW 13.9 12.3 - 15.4 %    RDW-SD 46.2 37.0 - 54.0 fl    MPV 10.3 6.0 - 12.0 fL    Platelets 189 140 - 450 10*3/mm3    Neutrophil % 54.7 42.7 - 76.0 %    Lymphocyte % 35.1 19.6 - 45.3 %    Monocyte % 7.4 5.0 - 12.0 %    Eosinophil % 2.0 0.3 - 6.2 %    Basophil % 0.3 0.0 - 1.5 %    Immature Grans % 0.5 0.0 - 0.5 %    Neutrophils, Absolute 3.62 1.70 - 7.00 10*3/mm3    Lymphocytes, Absolute 2.32 0.70 - 3.10 10*3/mm3    Monocytes, Absolute 0.49 0.10 - 0.90 10*3/mm3    Eosinophils, Absolute 0.13 0.00 - 0.40 10*3/mm3    Basophils, Absolute 0.02 0.00 - 0.20 10*3/mm3    Immature Grans, Absolute 0.03 0.00 - 0.05 10*3/mm3    nRBC 0.0 0.0 - 0.2 /100 WBC   Pregnancy, Urine - Urine, Clean Catch    Collection Time: 03/01/22  1:14 PM    Specimen: Urine, Clean Catch   Result Value Ref Range    HCG, Urine QL Negative Negative   CBC Auto Differential    Collection Time: 03/01/22  2:25 PM    Specimen: Blood   Result Value Ref Range    WBC 7.01 3.40 - 10.80 10*3/mm3    RBC 2.33 (L) 3.77 - 5.28 10*6/mm3    Hemoglobin 7.4 (L) 12.0 - 15.9 g/dL    Hematocrit 22.1 (L) 34.0 - 46.6 %    MCV 94.8 79.0 - 97.0 fL    MCH 31.8 26.6 - 33.0 pg    MCHC 33.5 31.5 - 35.7 g/dL    RDW 14.1 12.3 - 15.4 %    RDW-SD 47.6 37.0 - 54.0 fl    MPV 10.7 6.0 - 12.0 fL    Platelets 214 140 - 450 10*3/mm3    Neutrophil % 58.6 42.7 - 76.0 %    Lymphocyte % 35.1 19.6 - 45.3 %    Monocyte % 3.6 (L) 5.0 - 12.0 %    Eosinophil % 2.0 0.3 - 6.2 %    Basophil % 0.4 0.0 - 1.5 %    Immature Grans % 0.3 0.0 - 0.5 %    Neutrophils, Absolute 4.11 1.70 - 7.00 10*3/mm3    Lymphocytes, Absolute 2.46 0.70 - 3.10 10*3/mm3    Monocytes, Absolute 0.25 0.10 - 0.90 10*3/mm3    Eosinophils, Absolute 0.14 0.00 - 0.40 10*3/mm3    Basophils, Absolute 0.03 0.00 - 0.20 10*3/mm3    Immature Grans, Absolute 0.02 0.00 - 0.05 10*3/mm3    nRBC 0.0 0.0 - 0.2 /100 WBC   CBC Auto Differential    Collection Time: 03/01/22  8:11 PM    Specimen: Blood    Result Value Ref Range    WBC 7.66 3.40 - 10.80 10*3/mm3    RBC 2.30 (L) 3.77 - 5.28 10*6/mm3    Hemoglobin 7.3 (L) 12.0 - 15.9 g/dL    Hematocrit 21.7 (L) 34.0 - 46.6 %    MCV 94.3 79.0 - 97.0 fL    MCH 31.7 26.6 - 33.0 pg    MCHC 33.6 31.5 - 35.7 g/dL    RDW 14.3 12.3 - 15.4 %    RDW-SD 48.2 37.0 - 54.0 fl    MPV 10.1 6.0 - 12.0 fL    Platelets 203 140 - 450 10*3/mm3    Neutrophil % 50.8 42.7 - 76.0 %    Lymphocyte % 38.4 19.6 - 45.3 %    Monocyte % 7.0 5.0 - 12.0 %    Eosinophil % 3.0 0.3 - 6.2 %    Basophil % 0.4 0.0 - 1.5 %    Immature Grans % 0.4 0.0 - 0.5 %    Neutrophils, Absolute 3.89 1.70 - 7.00 10*3/mm3    Lymphocytes, Absolute 2.94 0.70 - 3.10 10*3/mm3    Monocytes, Absolute 0.54 0.10 - 0.90 10*3/mm3    Eosinophils, Absolute 0.23 0.00 - 0.40 10*3/mm3    Basophils, Absolute 0.03 0.00 - 0.20 10*3/mm3    Immature Grans, Absolute 0.03 0.00 - 0.05 10*3/mm3    nRBC 0.0 0.0 - 0.2 /100 WBC   Prepare RBC, 1 Units    Collection Time: 03/02/22  2:15 AM   Result Value Ref Range    Product Code F1096S95     Unit Number [id number]     UNIT  ABO O     UNIT  RH POS     Crossmatch Interpretation Compatible     Dispense Status PT     Blood Expiration Date [id number]     Blood Type Barcode 5100    CBC Auto Differential    Collection Time: 03/02/22  2:47 AM    Specimen: Blood   Result Value Ref Range    WBC 8.39 3.40 - 10.80 10*3/mm3    RBC 2.47 (L) 3.77 - 5.28 10*6/mm3    Hemoglobin 7.7 (L) 12.0 - 15.9 g/dL    Hematocrit 23.3 (L) 34.0 - 46.6 %    MCV 94.3 79.0 - 97.0 fL    MCH 31.2 26.6 - 33.0 pg    MCHC 33.0 31.5 - 35.7 g/dL    RDW 14.1 12.3 - 15.4 %    RDW-SD 47.2 37.0 - 54.0 fl    MPV 10.5 6.0 - 12.0 fL    Platelets 238 140 - 450 10*3/mm3    Neutrophil % 53.2 42.7 - 76.0 %    Lymphocyte % 35.5 19.6 - 45.3 %    Monocyte % 6.6 5.0 - 12.0 %    Eosinophil % 3.8 0.3 - 6.2 %    Basophil % 0.4 0.0 - 1.5 %    Immature Grans % 0.5 0.0 - 0.5 %    Neutrophils, Absolute 4.47 1.70 - 7.00 10*3/mm3    Lymphocytes, Absolute  2.98 0.70 - 3.10 10*3/mm3    Monocytes, Absolute 0.55 0.10 - 0.90 10*3/mm3    Eosinophils, Absolute 0.32 0.00 - 0.40 10*3/mm3    Basophils, Absolute 0.03 0.00 - 0.20 10*3/mm3    Immature Grans, Absolute 0.04 0.00 - 0.05 10*3/mm3    nRBC 0.0 0.0 - 0.2 /100 WBC   Magnesium    Collection Time: 03/02/22  2:47 AM    Specimen: Blood   Result Value Ref Range    Magnesium 2.5 (H) 1.6 - 2.4 mg/dL   Phosphorus    Collection Time: 03/02/22  2:47 AM    Specimen: Blood   Result Value Ref Range    Phosphorus 3.2 2.5 - 4.5 mg/dL   CBC Auto Differential    Collection Time: 03/02/22  8:17 AM    Specimen: Blood   Result Value Ref Range    WBC 8.27 3.40 - 10.80 10*3/mm3    RBC 2.54 (L) 3.77 - 5.28 10*6/mm3    Hemoglobin 7.9 (L) 12.0 - 15.9 g/dL    Hematocrit 24.3 (L) 34.0 - 46.6 %    MCV 95.7 79.0 - 97.0 fL    MCH 31.1 26.6 - 33.0 pg    MCHC 32.5 31.5 - 35.7 g/dL    RDW 14.3 12.3 - 15.4 %    RDW-SD 48.1 37.0 - 54.0 fl    MPV 9.9 6.0 - 12.0 fL    Platelets 234 140 - 450 10*3/mm3    Neutrophil % 58.9 42.7 - 76.0 %    Lymphocyte % 27.7 19.6 - 45.3 %    Monocyte % 8.7 5.0 - 12.0 %    Eosinophil % 3.9 0.3 - 6.2 %    Basophil % 0.4 0.0 - 1.5 %    Immature Grans % 0.4 0.0 - 0.5 %    Neutrophils, Absolute 4.88 1.70 - 7.00 10*3/mm3    Lymphocytes, Absolute 2.29 0.70 - 3.10 10*3/mm3    Monocytes, Absolute 0.72 0.10 - 0.90 10*3/mm3    Eosinophils, Absolute 0.32 0.00 - 0.40 10*3/mm3    Basophils, Absolute 0.03 0.00 - 0.20 10*3/mm3    Immature Grans, Absolute 0.03 0.00 - 0.05 10*3/mm3    nRBC 0.0 0.0 - 0.2 /100 WBC   ECG 12 Lead    Collection Time: 03/02/22  1:53 PM   Result Value Ref Range    QT Interval 437 ms   CBC Auto Differential    Collection Time: 03/03/22  4:34 AM    Specimen: Blood   Result Value Ref Range    WBC 5.99 3.40 - 10.80 10*3/mm3    RBC 2.32 (L) 3.77 - 5.28 10*6/mm3    Hemoglobin 7.4 (L) 12.0 - 15.9 g/dL    Hematocrit 22.5 (L) 34.0 - 46.6 %    MCV 97.0 79.0 - 97.0 fL    MCH 31.9 26.6 - 33.0 pg    MCHC 32.9 31.5 - 35.7 g/dL     RDW 14.4 12.3 - 15.4 %    RDW-SD 49.1 37.0 - 54.0 fl    MPV 10.4 6.0 - 12.0 fL    Platelets 231 140 - 450 10*3/mm3    Neutrophil % 56.1 42.7 - 76.0 %    Lymphocyte % 29.4 19.6 - 45.3 %    Monocyte % 8.7 5.0 - 12.0 %    Eosinophil % 5.2 0.3 - 6.2 %    Basophil % 0.3 0.0 - 1.5 %    Immature Grans % 0.3 0.0 - 0.5 %    Neutrophils, Absolute 3.36 1.70 - 7.00 10*3/mm3    Lymphocytes, Absolute 1.76 0.70 - 3.10 10*3/mm3    Monocytes, Absolute 0.52 0.10 - 0.90 10*3/mm3    Eosinophils, Absolute 0.31 0.00 - 0.40 10*3/mm3    Basophils, Absolute 0.02 0.00 - 0.20 10*3/mm3    Immature Grans, Absolute 0.02 0.00 - 0.05 10*3/mm3    nRBC 0.0 0.0 - 0.2 /100 WBC   Hemoglobin & Hematocrit, Blood    Collection Time: 03/03/22 11:34 AM    Specimen: Blood   Result Value Ref Range    Hemoglobin 7.8 (L) 12.0 - 15.9 g/dL    Hematocrit 23.7 (L) 34.0 - 46.6 %   CBC (No Diff)    Collection Time: 03/07/22 12:25 PM    Specimen: Blood   Result Value Ref Range    WBC 6.65 3.40 - 10.80 10*3/mm3    RBC 2.73 (L) 3.77 - 5.28 10*6/mm3    Hemoglobin 8.2 (L) 12.0 - 15.9 g/dL    Hematocrit 25.4 (L) 34.0 - 46.6 %    MCV 93.0 79.0 - 97.0 fL    MCH 30.0 26.6 - 33.0 pg    MCHC 32.3 31.5 - 35.7 g/dL    RDW 13.1 12.3 - 15.4 %    RDW-SD 43.5 37.0 - 54.0 fl    MPV 10.5 6.0 - 12.0 fL    Platelets 399 140 - 450 10*3/mm3   Basic metabolic panel    Collection Time: 03/07/22 12:25 PM    Specimen: Blood   Result Value Ref Range    Glucose 90 65 - 99 mg/dL    BUN 10 8 - 23 mg/dL    Creatinine 0.74 0.57 - 1.00 mg/dL    Sodium 139 136 - 145 mmol/L    Potassium 4.7 3.5 - 5.2 mmol/L    Chloride 106 98 - 107 mmol/L    CO2 23.9 22.0 - 29.0 mmol/L    Calcium 8.9 8.6 - 10.5 mg/dL    BUN/Creatinine Ratio 13.5 7.0 - 25.0    Anion Gap 9.1 5.0 - 15.0 mmol/L    eGFR 92.8 >60.0 mL/min/1.73   Lipid panel    Collection Time: 03/07/22 12:25 PM    Specimen: Blood   Result Value Ref Range    Total Cholesterol 124 0 - 200 mg/dL    Triglycerides 71 0 - 150 mg/dL    HDL Cholesterol 49 40 - 60  mg/dL    LDL Cholesterol  60 0 - 100 mg/dL    VLDL Cholesterol 15 5 - 40 mg/dL    LDL/HDL Ratio 1.24    Hemoglobin A1c    Collection Time: 03/07/22 12:25 PM    Specimen: Blood   Result Value Ref Range    Hemoglobin A1C 4.80 4.80 - 5.60 %   Iron Profile    Collection Time: 03/07/22 12:25 PM    Specimen: Blood   Result Value Ref Range    Iron 24 (L) 37 - 145 mcg/dL    Iron Saturation 6 (L) 20 - 50 %    Transferrin 283 200 - 360 mg/dL    TIBC 422 298 - 536 mcg/dL   Hemoglobin & Hematocrit, Blood    Collection Time: 03/17/22  2:54 PM    Specimen: Blood   Result Value Ref Range    Hemoglobin 7.2 (L) 12.0 - 15.9 g/dL    Hematocrit 22.6 (L) 34.0 - 46.6 %            Assessment/Plan   Problems Addressed this Visit        Unprioritized    Iron deficiency anemia due to chronic blood loss    Relevant Orders    CBC (No Diff) (Completed)    Basic metabolic panel (Completed)    Lipid panel (Completed)    Iron Profile (Completed)    HLD (hyperlipidemia)    Relevant Orders    CBC (No Diff) (Completed)    Basic metabolic panel (Completed)    Lipid panel (Completed)    MDD (major depressive disorder) (Chronic)    Relevant Orders    CBC (No Diff) (Completed)    Basic metabolic panel (Completed)    Lipid panel (Completed)    HTN (hypertension) (Chronic)    Relevant Orders    CBC (No Diff) (Completed)    Basic metabolic panel (Completed)    Lipid panel (Completed)    Atherosclerosis of coronary artery (Chronic)    Relevant Orders    CBC (No Diff) (Completed)    Basic metabolic panel (Completed)    Lipid panel (Completed)    Chronic allergic rhinitis (Chronic)    Relevant Orders    CBC (No Diff) (Completed)    Basic metabolic panel (Completed)    Lipid panel (Completed)    GERD without esophagitis (Chronic)    Relevant Medications    omeprazole (priLOSEC) 40 MG capsule    sucralfate (CARAFATE) 1 GM/10ML suspension    Stress ulcer of stomach    Relevant Medications    omeprazole (priLOSEC) 40 MG capsule    sucralfate (CARAFATE) 1 GM/10ML  suspension    Other Relevant Orders    Iron Profile (Completed)    Gastrointestinal hemorrhage    Relevant Medications    omeprazole (priLOSEC) 40 MG capsule    Other Relevant Orders    CBC (No Diff) (Completed)    Basic metabolic panel (Completed)    Lipid panel (Completed)    Prediabetes    Relevant Orders    Hemoglobin A1c (Completed)      Other Visit Diagnoses     Encounter for screening mammogram for malignant neoplasm of breast    -  Primary    Relevant Orders    Mammo Screening Digital Tomosynthesis Bilateral With CAD    CBC (No Diff) (Completed)    Basic metabolic panel (Completed)    Lipid panel (Completed)      Diagnoses       Codes Comments    Encounter for screening mammogram for malignant neoplasm of breast    -  Primary ICD-10-CM: Z12.31  ICD-9-CM: V76.12     Gastrointestinal hemorrhage, unspecified gastrointestinal hemorrhage type     ICD-10-CM: K92.2  ICD-9-CM: 578.9     Chronic allergic rhinitis     ICD-10-CM: J30.9  ICD-9-CM: 477.9     Primary hypertension     ICD-10-CM: I10  ICD-9-CM: 401.9     Mixed hyperlipidemia     ICD-10-CM: E78.2  ICD-9-CM: 272.2     Major depressive disorder with single episode, in full remission (HCC)     ICD-10-CM: F32.5  ICD-9-CM: 296.26     Atherosclerosis of native coronary artery of native heart without angina pectoris     ICD-10-CM: I25.10  ICD-9-CM: 414.01     Iron deficiency anemia due to chronic blood loss     ICD-10-CM: D50.0  ICD-9-CM: 280.0     Prediabetes     ICD-10-CM: R73.03  ICD-9-CM: 790.29     Stress ulcer of stomach     ICD-10-CM: K25.9  ICD-9-CM: 531.90     GERD without esophagitis     ICD-10-CM: K21.9  ICD-9-CM: 530.81           Recheck blood count, iron make sure improving    change to prilosec given headaches with protonix    back to work 3/14 if labs good, paperwork FMLA filled out today for patient    Monitor symptoms follow up if worse no better    *if CBC or labs concerning would recommend re-evaluate patient and follow up         Follow Up  "  Return in about 4 weeks (around 4/4/2022), or if symptoms worsen or fail to improve, for Labs before, Physical.      Additional Instructions for the Follow-ups that You Need to Schedule     Mammo Screening Digital Tomosynthesis Bilateral With CAD   Mar 12, 2022      Exam reason: mammogram screening    Release to patient: Immediate              Your Scheduled Appointments    Mar 28, 2022  1:00 PM  MAMMO ANUPAMA SCREEN KEVIN BILATERAL with ANUPAMA ETOWN JOHN 1  [facility] MAMMOGRAPHY AT [facility] ([place]) [address]  [place] [address]  [phone]   Arrive 30 minutes early to register, bring order.     Bring images & reports from outside images if scheduled sooner than 7 days.     Do not apply any lotions,deodorant,perfume or powder.     If patient is breast feeding patient must pump right before coming in for exam.       Apr 07, 2022  9:15 AM  Follow Up with TRUONG Powers  [facility] MEDICAL [facility] GASTROENTEROLOGY ([place]) [address]  [place] [address]  [phone]   Please bring any paperwork with you, bring your insurance cards/ID, & arrive 15 minutes early. (Patients must wear a mask, no guests allowed unless absolutely necessary, and we are currently using a \"parking lot\" waiting room.)             "

## 2022-03-15 DIAGNOSIS — D50.0 IRON DEFICIENCY ANEMIA DUE TO CHRONIC BLOOD LOSS: Primary | ICD-10-CM

## 2022-03-16 ENCOUNTER — TELEPHONE (OUTPATIENT)
Dept: GASTROENTEROLOGY | Facility: CLINIC | Age: 62
End: 2022-03-16

## 2022-03-16 NOTE — TELEPHONE ENCOUNTER
----- Message from TRUONG Borden sent at 3/15/2022  4:58 PM EDT -----  Dr Cabrera states we need to check H&H and stool for occult blood for anemia I will make orders pls notify pt

## 2022-03-16 NOTE — TELEPHONE ENCOUNTER
Spoke to pt and informed of Cinthia GUERIN and Dr. Rick santana for plan of care. Pt verbalized understanding.

## 2022-03-16 NOTE — TELEPHONE ENCOUNTER
----- Message from TRUONG Boredn sent at 3/15/2022  4:58 PM EDT -----  Dr Cabrera states we need to check H&H and stool for occult blood for anemia I will make orders pls notify pt

## 2022-03-17 ENCOUNTER — LAB (OUTPATIENT)
Dept: LAB | Facility: HOSPITAL | Age: 62
End: 2022-03-17

## 2022-03-17 DIAGNOSIS — D50.0 IRON DEFICIENCY ANEMIA DUE TO CHRONIC BLOOD LOSS: ICD-10-CM

## 2022-03-17 LAB
HCT VFR BLD AUTO: 22.6 % (ref 34–46.6)
HGB BLD-MCNC: 7.2 G/DL (ref 12–15.9)

## 2022-03-17 PROCEDURE — 36415 COLL VENOUS BLD VENIPUNCTURE: CPT

## 2022-03-17 PROCEDURE — 85018 HEMOGLOBIN: CPT

## 2022-03-17 PROCEDURE — 85014 HEMATOCRIT: CPT

## 2022-03-18 ENCOUNTER — LAB (OUTPATIENT)
Dept: LAB | Facility: HOSPITAL | Age: 62
End: 2022-03-18

## 2022-03-18 ENCOUNTER — HOSPITAL ENCOUNTER (EMERGENCY)
Facility: HOSPITAL | Age: 62
Discharge: HOME | End: 2022-03-18
Attending: EMERGENCY MEDICINE | Admitting: EMERGENCY MEDICINE

## 2022-03-18 VITALS
BODY MASS INDEX: 29.54 KG/M2 | OXYGEN SATURATION: 98 % | HEART RATE: 64 BPM | DIASTOLIC BLOOD PRESSURE: 68 MMHG | RESPIRATION RATE: 16 BRPM | TEMPERATURE: 98.1 F | SYSTOLIC BLOOD PRESSURE: 118 MMHG | WEIGHT: 194.89 LBS | HEIGHT: 68 IN

## 2022-03-18 DIAGNOSIS — D50.0 IRON DEFICIENCY ANEMIA DUE TO CHRONIC BLOOD LOSS: ICD-10-CM

## 2022-03-18 DIAGNOSIS — D50.0 IRON DEFICIENCY ANEMIA DUE TO CHRONIC BLOOD LOSS: Primary | ICD-10-CM

## 2022-03-18 DIAGNOSIS — K92.2 GASTROINTESTINAL HEMORRHAGE, UNSPECIFIED GASTROINTESTINAL HEMORRHAGE TYPE: Primary | ICD-10-CM

## 2022-03-18 DIAGNOSIS — K25.9 STRESS ULCER OF STOMACH: Primary | ICD-10-CM

## 2022-03-18 DIAGNOSIS — K92.2 GASTROINTESTINAL HEMORRHAGE, UNSPECIFIED GASTROINTESTINAL HEMORRHAGE TYPE: ICD-10-CM

## 2022-03-18 DIAGNOSIS — D64.9 SYMPTOMATIC ANEMIA: ICD-10-CM

## 2022-03-18 PROBLEM — U07.1 PNEUMONIA DUE TO COVID-19 VIRUS: Status: RESOLVED | Noted: 2021-08-12 | Resolved: 2022-03-18

## 2022-03-18 PROBLEM — U07.1 COVID-19 VIRUS INFECTION: Status: RESOLVED | Noted: 2021-08-12 | Resolved: 2022-03-18

## 2022-03-18 PROBLEM — J12.82 PNEUMONIA DUE TO COVID-19 VIRUS: Status: RESOLVED | Noted: 2021-08-12 | Resolved: 2022-03-18

## 2022-03-18 LAB
ABO GROUP BLD: NORMAL
ALBUMIN SERPL-MCNC: 4.1 G/DL (ref 3.5–5.2)
ALBUMIN/GLOB SERPL: 2.1 G/DL
ALP SERPL-CCNC: 84 U/L (ref 39–117)
ALT SERPL W P-5'-P-CCNC: 17 U/L (ref 1–33)
ANION GAP SERPL CALCULATED.3IONS-SCNC: 8.6 MMOL/L (ref 5–15)
AST SERPL-CCNC: 21 U/L (ref 1–32)
BASOPHILS # BLD AUTO: 0.03 10*3/MM3 (ref 0–0.2)
BASOPHILS NFR BLD AUTO: 0.5 % (ref 0–1.5)
BILIRUB SERPL-MCNC: 0.2 MG/DL (ref 0–1.2)
BLD GP AB SCN SERPL QL: NEGATIVE
BUN SERPL-MCNC: 12 MG/DL (ref 8–23)
BUN/CREAT SERPL: 14.6 (ref 7–25)
CALCIUM SPEC-SCNC: 9.1 MG/DL (ref 8.6–10.5)
CHLORIDE SERPL-SCNC: 105 MMOL/L (ref 98–107)
CO2 SERPL-SCNC: 25.4 MMOL/L (ref 22–29)
CREAT SERPL-MCNC: 0.82 MG/DL (ref 0.57–1)
DEPRECATED RDW RBC AUTO: 47.9 FL (ref 37–54)
EGFRCR SERPLBLD CKD-EPI 2021: 82 ML/MIN/1.73
EOSINOPHIL # BLD AUTO: 0.15 10*3/MM3 (ref 0–0.4)
EOSINOPHIL NFR BLD AUTO: 2.5 % (ref 0.3–6.2)
ERYTHROCYTE [DISTWIDTH] IN BLOOD BY AUTOMATED COUNT: 14.2 % (ref 12.3–15.4)
GLOBULIN UR ELPH-MCNC: 2 GM/DL
GLUCOSE SERPL-MCNC: 104 MG/DL (ref 65–99)
HCT VFR BLD AUTO: 24.2 % (ref 34–46.6)
HEMOCCULT STL QL IA: POSITIVE
HEMOCCULT STL QL IA: POSITIVE
HGB BLD-MCNC: 7.6 G/DL (ref 12–15.9)
HOLD SPECIMEN: NORMAL
HOLD SPECIMEN: NORMAL
IMM GRANULOCYTES # BLD AUTO: 0.01 10*3/MM3 (ref 0–0.05)
IMM GRANULOCYTES NFR BLD AUTO: 0.2 % (ref 0–0.5)
LYMPHOCYTES # BLD AUTO: 1.77 10*3/MM3 (ref 0.7–3.1)
LYMPHOCYTES NFR BLD AUTO: 29 % (ref 19.6–45.3)
MCH RBC QN AUTO: 29.1 PG (ref 26.6–33)
MCHC RBC AUTO-ENTMCNC: 31.4 G/DL (ref 31.5–35.7)
MCV RBC AUTO: 92.7 FL (ref 79–97)
MONOCYTES # BLD AUTO: 0.48 10*3/MM3 (ref 0.1–0.9)
MONOCYTES NFR BLD AUTO: 7.9 % (ref 5–12)
NEUTROPHILS NFR BLD AUTO: 3.66 10*3/MM3 (ref 1.7–7)
NEUTROPHILS NFR BLD AUTO: 59.9 % (ref 42.7–76)
NRBC BLD AUTO-RTO: 0 /100 WBC (ref 0–0.2)
PLATELET # BLD AUTO: 350 10*3/MM3 (ref 140–450)
PMV BLD AUTO: 9.1 FL (ref 6–12)
POTASSIUM SERPL-SCNC: 4.1 MMOL/L (ref 3.5–5.2)
PROT SERPL-MCNC: 6.1 G/DL (ref 6–8.5)
RBC # BLD AUTO: 2.61 10*6/MM3 (ref 3.77–5.28)
RH BLD: POSITIVE
SODIUM SERPL-SCNC: 139 MMOL/L (ref 136–145)
T&S EXPIRATION DATE: NORMAL
WBC NRBC COR # BLD: 6.1 10*3/MM3 (ref 3.4–10.8)
WHOLE BLOOD HOLD SPECIMEN: NORMAL
WHOLE BLOOD HOLD SPECIMEN: NORMAL

## 2022-03-18 PROCEDURE — 86900 BLOOD TYPING SEROLOGIC ABO: CPT

## 2022-03-18 PROCEDURE — 86923 COMPATIBILITY TEST ELECTRIC: CPT

## 2022-03-18 PROCEDURE — 80053 COMPREHEN METABOLIC PANEL: CPT

## 2022-03-18 PROCEDURE — 36415 COLL VENOUS BLD VENIPUNCTURE: CPT

## 2022-03-18 PROCEDURE — 86850 RBC ANTIBODY SCREEN: CPT | Performed by: EMERGENCY MEDICINE

## 2022-03-18 PROCEDURE — P9016 RBC LEUKOCYTES REDUCED: HCPCS

## 2022-03-18 PROCEDURE — 82274 ASSAY TEST FOR BLOOD FECAL: CPT | Performed by: NURSE PRACTITIONER

## 2022-03-18 PROCEDURE — 82274 ASSAY TEST FOR BLOOD FECAL: CPT

## 2022-03-18 PROCEDURE — 86901 BLOOD TYPING SEROLOGIC RH(D): CPT | Performed by: EMERGENCY MEDICINE

## 2022-03-18 PROCEDURE — 86900 BLOOD TYPING SEROLOGIC ABO: CPT | Performed by: EMERGENCY MEDICINE

## 2022-03-18 PROCEDURE — 85025 COMPLETE CBC W/AUTO DIFF WBC: CPT

## 2022-03-18 PROCEDURE — 99283 EMERGENCY DEPT VISIT LOW MDM: CPT

## 2022-03-18 PROCEDURE — 36430 TRANSFUSION BLD/BLD COMPNT: CPT

## 2022-03-18 RX ORDER — FLUTICASONE PROPIONATE 50 MCG
2 SPRAY, SUSPENSION (ML) NASAL DAILY
Qty: 11.1 ML | Refills: 5 | Status: SHIPPED | OUTPATIENT
Start: 2022-03-18

## 2022-03-18 RX ORDER — SODIUM CHLORIDE 0.9 % (FLUSH) 0.9 %
10 SYRINGE (ML) INJECTION AS NEEDED
Status: DISCONTINUED | OUTPATIENT
Start: 2022-03-18 | End: 2022-03-18 | Stop reason: HOSPADM

## 2022-03-18 RX ORDER — SUCRALFATE ORAL 1 G/10ML
1 SUSPENSION ORAL
COMMUNITY
Start: 2022-03-08

## 2022-03-18 RX ORDER — CYCLOBENZAPRINE HCL 10 MG
10 TABLET ORAL 3 TIMES DAILY PRN
Qty: 90 TABLET | Refills: 5
Start: 2022-03-18 | End: 2022-12-21

## 2022-03-18 NOTE — DISCHARGE INSTRUCTIONS
Return to ER if you worsen.  Continue Protonix by mouth.  High iron diet to help with anemia (red meats, legumes, etc).

## 2022-03-18 NOTE — ED PROVIDER NOTES
Marsha Michelle is a 62-year-old female that presents to the emergency department today per Dr. Cabrera's office.  The staff called her this morning and told her that her hemoglobin was 7.2 from her lab draw on Wednesday and her fecal occult was positive again.  The patient was recently admitted here for GI bleed and Dr. Cabrera did an upper and lower scope on the patient that was unsuccessful in locating the bleed, but he said he feels it is in the small intestine.  Patient states she takes Protonix 40 mg by mouth daily.  She reports that since Monday she has noticed her stool has been darker than normal and had her labs redrawn in her stool checked.  She reports fatigue, lightheadedness, weakness as well.  She denies any chest pain, shortness of air, nausea, vomiting, diarrhea, abdominal pain, fever, UTI symptoms or any other complaints.          Review of Systems   Constitutional: Positive for fatigue.   Gastrointestinal: Positive for blood in stool.   Neurological: Positive for weakness.   All other systems reviewed and are negative.      Past Medical History:   Diagnosis Date   • Allergic    • Angina at rest (HCC)    • Anxiety    • Bursitis of left shoulder 03/26/2018   • CAD (coronary artery disease) 04/20/2015   • Chronic allergic rhinitis    • DDD (degenerative disc disease), lumbar 02/22/2016   • GERD (gastroesophageal reflux disease) 02/22/2016   • History of coronary artery stent placement    • HLD (hyperlipidemia)    • HTN (hypertension)    • IBS (irritable bowel syndrome) 02/22/2016   • MDD (major depressive disorder)    • Nondisplaced fracture of proximal end of right fibula 08/16/2017   • OA (osteoarthritis)    • Obesity (BMI 30-39.9)    • Seasonal allergies        Allergies   Allergen Reactions   • Shellfish Allergy Anaphylaxis   • Shellfish-Derived Products Anaphylaxis     Throat swelling   • Penicillins Unknown - High Severity     As baby          Past Surgical History:   Procedure Laterality  Date   • CARDIAC SURGERY     •  SECTION     • COLONOSCOPY     • COLONOSCOPY N/A 3/2/2022    Procedure: COLONOSCOPY;  Surgeon: Santana Cabrera MD;  Location: [facility] ENDOSCOPY;  Service: Gastroenterology;  Laterality: N/A;  diverticulosis   • CORONARY ANGIOPLASTY WITH STENT PLACEMENT     • ENDOSCOPY N/A 3/1/2022    Procedure: ESOPHAGOGASTRODUODENOSCOPY WITH BIOPSY;  Surgeon: Santana Cabrera MD;  Location: [facility] ENDOSCOPY;  Service: Gastroenterology;  Laterality: N/A;  PREVIOUS SURGERY/GASTRIC EROSIONS   • EYE SURGERY      Implant    • HYSTERECTOMY     • LAPAROSCOPIC GASTRIC BANDING     • TONSILLECTOMY         Family History   Problem Relation Age of Onset   • Heart disease Mother    • Diabetes Mother    • Arthritis Mother    • Osteoporosis Mother    • Heart disease Father    • Diabetes Father    • Arthritis Father    • Stroke Sister    • Heart disease Other    • Diabetes Other        Social History     Socioeconomic History   • Marital status:    Tobacco Use   • Smoking status: Former Smoker     Packs/day: 0.50     Years: 25.00     Pack years: 12.50     Start date:      Quit date:      Years since quitting: 15.2   • Smokeless tobacco: Never Used   • Tobacco comment: QUIT 15 YEARS AGO   Vaping Use   • Vaping Use: Never used   Substance and Sexual Activity   • Alcohol use: Yes     Comment: Occ; has been drinking for 31 or more years   • Drug use: Never           Objective   Physical Exam  Vitals and nursing note reviewed.   Constitutional:       General: She is not in acute distress.     Appearance: Normal appearance. She is normal weight. She is not ill-appearing, toxic-appearing or diaphoretic.   HENT:      Head: Normocephalic and atraumatic.      Mouth/Throat:      Mouth: Mucous membranes are moist.   Eyes:      General: No scleral icterus.     Conjunctiva/sclera: Conjunctivae normal.   Cardiovascular:      Rate and Rhythm: Normal rate and regular rhythm.      Pulses:  Normal pulses.      Heart sounds: Normal heart sounds.   Pulmonary:      Effort: Pulmonary effort is normal. No respiratory distress.      Breath sounds: Normal breath sounds.   Abdominal:      General: Abdomen is flat.      Palpations: Abdomen is soft.      Tenderness: There is no abdominal tenderness.   Genitourinary:     Rectum: Normal.      Comments: No gross blood  Musculoskeletal:         General: Normal range of motion.      Cervical back: Normal range of motion and neck supple.   Skin:     General: Skin is warm and dry.      Capillary Refill: Capillary refill takes less than 2 seconds.   Neurological:      General: No focal deficit present.      Mental Status: She is alert and oriented to person, place, and time. Mental status is at baseline.   Psychiatric:         Mood and Affect: Mood normal.         Behavior: Behavior normal.         Thought Content: Thought content normal.         Judgment: Judgment normal.         Procedures           ED Course                                                 MDM  Number of Diagnoses or Management Options  Gastrointestinal hemorrhage, unspecified gastrointestinal hemorrhage type  Symptomatic anemia  Diagnosis management comments: Seen and assessed patient as noted.  Vital stable, no acute distress, afebrile.    Hemoglobin today is 7.6 and patient is symptomatic with weakness, lightheadedness, fatigue.  Dr Cabrera's staff call the patient this morning and told her to come into our ER because her hemoglobin was 7.2 on Wednesday and her fecal occult was positive.  Patient said she had noticed her stool was darker than usual again this past Monday and that is why she had her labs rechecked.  She was just discharged not too long ago from here for a GI bleed.    Dr. Coleman was consulted and asked for me to add her to her list to consult on and admit the patient for GI bleed.  Dr Ponce called and said he spoke to Dr. Coleman and they both feel that the patient was just  being waiting here over the weekend without having any imaging done so that after the patient is treated with blood transfusions she is safe to go home and follow-up on Monday with GI.  I discussed this with Dr. Rowan and the patient who both agreed with this plan.  I feel safe with this plan as patient is healthy, no acute distress, smiling, agreeable to plan.  Educated her on worrisome symptoms to return for and she verbalized understanding.  She will follow up with GI on Monday.       Amount and/or Complexity of Data Reviewed  Clinical lab tests: reviewed and ordered  Decide to obtain previous medical records or to obtain history from someone other than the patient: yes  Discuss the patient with other providers: yes    Risk of Complications, Morbidity, and/or Mortality  Presenting problems: moderate  Diagnostic procedures: moderate  Management options: moderate    Patient Progress  Patient progress: stable      Final diagnoses:   Gastrointestinal hemorrhage, unspecified gastrointestinal hemorrhage type   Symptomatic anemia       ED Disposition  ED Disposition     ED Disposition   Discharge    Condition   Stable    Comment   --             Santana Cabrera MD  [address]  [place] [address]  [phone]    Schedule an appointment as soon as possible for a visit on 3/21/2022  Call Monday for appointment asap         Medication List      No changes were made to your prescriptions during this visit.          Mariangel White, TRUONG  03/18/22 1702       Mariangel White, TRUONG  03/18/22 1703       Mariangel White, TRUONG  03/18/22 2033

## 2022-03-20 LAB
BH BB BLOOD EXPIRATION DATE: NORMAL
BH BB BLOOD EXPIRATION DATE: NORMAL
BH BB BLOOD TYPE BARCODE: 5100
BH BB BLOOD TYPE BARCODE: 5100
BH BB DISPENSE STATUS: NORMAL
BH BB DISPENSE STATUS: NORMAL
BH BB PRODUCT CODE: NORMAL
BH BB PRODUCT CODE: NORMAL
BH BB UNIT NUMBER: NORMAL
BH BB UNIT NUMBER: NORMAL
CROSSMATCH INTERPRETATION: NORMAL
CROSSMATCH INTERPRETATION: NORMAL
UNIT  ABO: NORMAL
UNIT  ABO: NORMAL
UNIT  RH: NORMAL
UNIT  RH: NORMAL

## 2022-03-23 LAB — QT INTERVAL: 437 MS

## 2022-03-28 ENCOUNTER — APPOINTMENT (OUTPATIENT)
Dept: MAMMOGRAPHY | Facility: HOSPITAL | Age: 62
End: 2022-03-28

## 2022-03-29 ENCOUNTER — TELEPHONE (OUTPATIENT)
Dept: FAMILY MEDICINE CLINIC | Facility: CLINIC | Age: 62
End: 2022-03-29

## 2022-04-13 ENCOUNTER — APPOINTMENT (OUTPATIENT)
Dept: CT IMAGING | Facility: HOSPITAL | Age: 62
End: 2022-04-13

## 2022-05-24 ENCOUNTER — HOSPITAL ENCOUNTER (OUTPATIENT)
Dept: MAMMOGRAPHY | Facility: HOSPITAL | Age: 62
Discharge: HOME | End: 2022-05-24
Admitting: FAMILY MEDICINE

## 2022-05-24 DIAGNOSIS — Z12.31 ENCOUNTER FOR SCREENING MAMMOGRAM FOR MALIGNANT NEOPLASM OF BREAST: ICD-10-CM

## 2022-05-24 PROCEDURE — 77067 SCR MAMMO BI INCL CAD: CPT

## 2022-05-24 PROCEDURE — 77063 BREAST TOMOSYNTHESIS BI: CPT

## 2022-09-13 ENCOUNTER — HOSPITAL ENCOUNTER (OUTPATIENT)
Facility: HOSPITAL | Age: 62
Discharge: HOME | End: 2022-09-15
Attending: STUDENT IN AN ORGANIZED HEALTH CARE EDUCATION/TRAINING PROGRAM | Admitting: INTERNAL MEDICINE

## 2022-09-13 DIAGNOSIS — R26.2 DIFFICULTY WALKING: ICD-10-CM

## 2022-09-13 DIAGNOSIS — Z78.9 DECREASED ACTIVITIES OF DAILY LIVING (ADL): ICD-10-CM

## 2022-09-13 DIAGNOSIS — K29.71 GASTROINTESTINAL HEMORRHAGE ASSOCIATED WITH GASTRITIS, UNSPECIFIED GASTRITIS TYPE: ICD-10-CM

## 2022-09-13 DIAGNOSIS — K92.2 GASTROINTESTINAL HEMORRHAGE, UNSPECIFIED GASTROINTESTINAL HEMORRHAGE TYPE: ICD-10-CM

## 2022-09-13 DIAGNOSIS — K92.1 MELENA: Primary | ICD-10-CM

## 2022-09-13 DIAGNOSIS — K25.9 STRESS ULCER OF STOMACH: ICD-10-CM

## 2022-09-13 LAB
ABO GROUP BLD: NORMAL
ABO GROUP BLD: NORMAL
ALBUMIN SERPL-MCNC: 4 G/DL (ref 3.5–5.2)
ALBUMIN/GLOB SERPL: 1.6 G/DL
ALP SERPL-CCNC: 101 U/L (ref 39–117)
ALT SERPL W P-5'-P-CCNC: 20 U/L (ref 1–33)
ANION GAP SERPL CALCULATED.3IONS-SCNC: 9 MMOL/L (ref 5–15)
AST SERPL-CCNC: 24 U/L (ref 1–32)
BASOPHILS # BLD AUTO: 0.04 10*3/MM3 (ref 0–0.2)
BASOPHILS NFR BLD AUTO: 0.4 % (ref 0–1.5)
BILIRUB SERPL-MCNC: 0.2 MG/DL (ref 0–1.2)
BLD GP AB SCN SERPL QL: NEGATIVE
BUN SERPL-MCNC: 17 MG/DL (ref 8–23)
BUN/CREAT SERPL: 24.6 (ref 7–25)
CALCIUM SPEC-SCNC: 8.6 MG/DL (ref 8.6–10.5)
CHLORIDE SERPL-SCNC: 105 MMOL/L (ref 98–107)
CO2 SERPL-SCNC: 25 MMOL/L (ref 22–29)
CREAT SERPL-MCNC: 0.69 MG/DL (ref 0.57–1)
D-LACTATE SERPL-SCNC: 0.7 MMOL/L (ref 0.5–2)
DEPRECATED RDW RBC AUTO: 49.3 FL (ref 37–54)
EGFRCR SERPLBLD CKD-EPI 2021: 99.5 ML/MIN/1.73
EOSINOPHIL # BLD AUTO: 0.3 10*3/MM3 (ref 0–0.4)
EOSINOPHIL NFR BLD AUTO: 3 % (ref 0.3–6.2)
ERYTHROCYTE [DISTWIDTH] IN BLOOD BY AUTOMATED COUNT: 14.1 % (ref 12.3–15.4)
GLOBULIN UR ELPH-MCNC: 2.5 GM/DL
GLUCOSE SERPL-MCNC: 103 MG/DL (ref 65–99)
HCT VFR BLD AUTO: 32.2 % (ref 34–46.6)
HGB BLD-MCNC: 10.5 G/DL (ref 12–15.9)
HOLD SPECIMEN: NORMAL
IMM GRANULOCYTES # BLD AUTO: 0.04 10*3/MM3 (ref 0–0.05)
IMM GRANULOCYTES NFR BLD AUTO: 0.4 % (ref 0–0.5)
LYMPHOCYTES # BLD AUTO: 3.34 10*3/MM3 (ref 0.7–3.1)
LYMPHOCYTES NFR BLD AUTO: 33.5 % (ref 19.6–45.3)
MCH RBC QN AUTO: 30.9 PG (ref 26.6–33)
MCHC RBC AUTO-ENTMCNC: 32.6 G/DL (ref 31.5–35.7)
MCV RBC AUTO: 94.7 FL (ref 79–97)
MONOCYTES # BLD AUTO: 0.52 10*3/MM3 (ref 0.1–0.9)
MONOCYTES NFR BLD AUTO: 5.2 % (ref 5–12)
NEUTROPHILS NFR BLD AUTO: 5.73 10*3/MM3 (ref 1.7–7)
NEUTROPHILS NFR BLD AUTO: 57.5 % (ref 42.7–76)
NRBC BLD AUTO-RTO: 0 /100 WBC (ref 0–0.2)
PLATELET # BLD AUTO: 288 10*3/MM3 (ref 140–450)
PMV BLD AUTO: 10.2 FL (ref 6–12)
POTASSIUM SERPL-SCNC: 3.8 MMOL/L (ref 3.5–5.2)
PROT SERPL-MCNC: 6.5 G/DL (ref 6–8.5)
RBC # BLD AUTO: 3.4 10*6/MM3 (ref 3.77–5.28)
RH BLD: POSITIVE
RH BLD: POSITIVE
SODIUM SERPL-SCNC: 139 MMOL/L (ref 136–145)
T&S EXPIRATION DATE: NORMAL
WBC NRBC COR # BLD: 9.97 10*3/MM3 (ref 3.4–10.8)
WHOLE BLOOD HOLD COAG: NORMAL
WHOLE BLOOD HOLD COAG: NORMAL
WHOLE BLOOD HOLD SPECIMEN: NORMAL
WHOLE BLOOD HOLD SPECIMEN: NORMAL

## 2022-09-13 PROCEDURE — 36415 COLL VENOUS BLD VENIPUNCTURE: CPT

## 2022-09-13 PROCEDURE — 99284 EMERGENCY DEPT VISIT MOD MDM: CPT

## 2022-09-13 PROCEDURE — 86900 BLOOD TYPING SEROLOGIC ABO: CPT

## 2022-09-13 PROCEDURE — 86901 BLOOD TYPING SEROLOGIC RH(D): CPT | Performed by: STUDENT IN AN ORGANIZED HEALTH CARE EDUCATION/TRAINING PROGRAM

## 2022-09-13 PROCEDURE — 85025 COMPLETE CBC W/AUTO DIFF WBC: CPT | Performed by: STUDENT IN AN ORGANIZED HEALTH CARE EDUCATION/TRAINING PROGRAM

## 2022-09-13 PROCEDURE — G0378 HOSPITAL OBSERVATION PER HR: HCPCS

## 2022-09-13 PROCEDURE — 83605 ASSAY OF LACTIC ACID: CPT | Performed by: STUDENT IN AN ORGANIZED HEALTH CARE EDUCATION/TRAINING PROGRAM

## 2022-09-13 PROCEDURE — 96376 TX/PRO/DX INJ SAME DRUG ADON: CPT

## 2022-09-13 PROCEDURE — 86850 RBC ANTIBODY SCREEN: CPT | Performed by: STUDENT IN AN ORGANIZED HEALTH CARE EDUCATION/TRAINING PROGRAM

## 2022-09-13 PROCEDURE — 99220 PR INITIAL OBSERVATION CARE/DAY 70 MINUTES: CPT | Performed by: HOSPITALIST

## 2022-09-13 PROCEDURE — 96361 HYDRATE IV INFUSION ADD-ON: CPT

## 2022-09-13 PROCEDURE — 80053 COMPREHEN METABOLIC PANEL: CPT | Performed by: STUDENT IN AN ORGANIZED HEALTH CARE EDUCATION/TRAINING PROGRAM

## 2022-09-13 PROCEDURE — 86901 BLOOD TYPING SEROLOGIC RH(D): CPT

## 2022-09-13 PROCEDURE — 86900 BLOOD TYPING SEROLOGIC ABO: CPT | Performed by: STUDENT IN AN ORGANIZED HEALTH CARE EDUCATION/TRAINING PROGRAM

## 2022-09-13 RX ORDER — BUPROPION HYDROCHLORIDE 150 MG/1
300 TABLET ORAL DAILY
Status: DISCONTINUED | OUTPATIENT
Start: 2022-09-13 | End: 2022-09-15 | Stop reason: HOSPADM

## 2022-09-13 RX ORDER — BISACODYL 10 MG
10 SUPPOSITORY, RECTAL RECTAL DAILY PRN
Status: DISCONTINUED | OUTPATIENT
Start: 2022-09-13 | End: 2022-09-15 | Stop reason: HOSPADM

## 2022-09-13 RX ORDER — ISOSORBIDE MONONITRATE 30 MG/1
30 TABLET, EXTENDED RELEASE ORAL DAILY
Status: DISCONTINUED | OUTPATIENT
Start: 2022-09-13 | End: 2022-09-15 | Stop reason: HOSPADM

## 2022-09-13 RX ORDER — SODIUM CHLORIDE 9 MG/ML
100 INJECTION, SOLUTION INTRAVENOUS CONTINUOUS
Status: DISCONTINUED | OUTPATIENT
Start: 2022-09-13 | End: 2022-09-14

## 2022-09-13 RX ORDER — POLYETHYLENE GLYCOL 3350 17 G/17G
17 POWDER, FOR SOLUTION ORAL DAILY PRN
Status: DISCONTINUED | OUTPATIENT
Start: 2022-09-13 | End: 2022-09-15 | Stop reason: HOSPADM

## 2022-09-13 RX ORDER — MORPHINE SULFATE 2 MG/ML
1 INJECTION, SOLUTION INTRAMUSCULAR; INTRAVENOUS EVERY 4 HOURS PRN
Status: DISCONTINUED | OUTPATIENT
Start: 2022-09-13 | End: 2022-09-14

## 2022-09-13 RX ORDER — SODIUM CHLORIDE 9 MG/ML
40 INJECTION, SOLUTION INTRAVENOUS AS NEEDED
Status: DISCONTINUED | OUTPATIENT
Start: 2022-09-13 | End: 2022-09-15 | Stop reason: HOSPADM

## 2022-09-13 RX ORDER — AMOXICILLIN 250 MG
2 CAPSULE ORAL 2 TIMES DAILY
Status: DISCONTINUED | OUTPATIENT
Start: 2022-09-13 | End: 2022-09-15 | Stop reason: HOSPADM

## 2022-09-13 RX ORDER — NEBIVOLOL 2.5 MG/1
5 TABLET ORAL DAILY
Status: DISCONTINUED | OUTPATIENT
Start: 2022-09-13 | End: 2022-09-15 | Stop reason: HOSPADM

## 2022-09-13 RX ORDER — PANTOPRAZOLE SODIUM 40 MG/10ML
40 INJECTION, POWDER, LYOPHILIZED, FOR SOLUTION INTRAVENOUS
Status: DISCONTINUED | OUTPATIENT
Start: 2022-09-13 | End: 2022-09-13

## 2022-09-13 RX ORDER — ACETAMINOPHEN 325 MG/1
650 TABLET ORAL EVERY 4 HOURS PRN
Status: DISCONTINUED | OUTPATIENT
Start: 2022-09-13 | End: 2022-09-15 | Stop reason: HOSPADM

## 2022-09-13 RX ORDER — NITROGLYCERIN 0.4 MG/1
0.4 TABLET SUBLINGUAL
Status: DISCONTINUED | OUTPATIENT
Start: 2022-09-13 | End: 2022-09-13 | Stop reason: SDUPTHER

## 2022-09-13 RX ORDER — NITROGLYCERIN 0.4 MG/1
0.4 TABLET SUBLINGUAL
Status: DISCONTINUED | OUTPATIENT
Start: 2022-09-13 | End: 2022-09-14

## 2022-09-13 RX ORDER — ACETAMINOPHEN 160 MG/5ML
650 SOLUTION ORAL EVERY 4 HOURS PRN
Status: DISCONTINUED | OUTPATIENT
Start: 2022-09-13 | End: 2022-09-14

## 2022-09-13 RX ORDER — PANTOPRAZOLE SODIUM 40 MG/10ML
40 INJECTION, POWDER, LYOPHILIZED, FOR SOLUTION INTRAVENOUS ONCE
Status: COMPLETED | OUTPATIENT
Start: 2022-09-13 | End: 2022-09-13

## 2022-09-13 RX ORDER — BISACODYL 5 MG/1
5 TABLET, DELAYED RELEASE ORAL DAILY PRN
Status: DISCONTINUED | OUTPATIENT
Start: 2022-09-13 | End: 2022-09-15 | Stop reason: HOSPADM

## 2022-09-13 RX ORDER — HYDROCODONE BITARTRATE AND ACETAMINOPHEN 5; 325 MG/1; MG/1
1 TABLET ORAL EVERY 4 HOURS PRN
Status: DISCONTINUED | OUTPATIENT
Start: 2022-09-13 | End: 2022-09-15 | Stop reason: HOSPADM

## 2022-09-13 RX ORDER — CETIRIZINE HYDROCHLORIDE 10 MG/1
10 TABLET ORAL DAILY
Status: DISCONTINUED | OUTPATIENT
Start: 2022-09-13 | End: 2022-09-15 | Stop reason: HOSPADM

## 2022-09-13 RX ORDER — CHOLECALCIFEROL (VITAMIN D3) 125 MCG
5 CAPSULE ORAL NIGHTLY PRN
Status: DISCONTINUED | OUTPATIENT
Start: 2022-09-13 | End: 2022-09-15 | Stop reason: HOSPADM

## 2022-09-13 RX ORDER — SODIUM CHLORIDE 0.9 % (FLUSH) 0.9 %
10 SYRINGE (ML) INJECTION AS NEEDED
Status: DISCONTINUED | OUTPATIENT
Start: 2022-09-13 | End: 2022-09-15 | Stop reason: HOSPADM

## 2022-09-13 RX ORDER — SODIUM CHLORIDE 0.9 % (FLUSH) 0.9 %
10 SYRINGE (ML) INJECTION EVERY 12 HOURS SCHEDULED
Status: DISCONTINUED | OUTPATIENT
Start: 2022-09-13 | End: 2022-09-15 | Stop reason: HOSPADM

## 2022-09-13 RX ORDER — ATORVASTATIN CALCIUM 40 MG/1
40 TABLET, FILM COATED ORAL NIGHTLY
Status: DISCONTINUED | OUTPATIENT
Start: 2022-09-13 | End: 2022-09-15 | Stop reason: HOSPADM

## 2022-09-13 RX ORDER — NALOXONE HCL 0.4 MG/ML
0.4 VIAL (ML) INJECTION
Status: DISCONTINUED | OUTPATIENT
Start: 2022-09-13 | End: 2022-09-14

## 2022-09-13 RX ORDER — ONDANSETRON 4 MG/1
4 TABLET, FILM COATED ORAL EVERY 6 HOURS PRN
Status: DISCONTINUED | OUTPATIENT
Start: 2022-09-13 | End: 2022-09-15 | Stop reason: HOSPADM

## 2022-09-13 RX ORDER — ONDANSETRON 2 MG/ML
4 INJECTION INTRAMUSCULAR; INTRAVENOUS EVERY 6 HOURS PRN
Status: DISCONTINUED | OUTPATIENT
Start: 2022-09-13 | End: 2022-09-15 | Stop reason: HOSPADM

## 2022-09-13 RX ORDER — CYCLOBENZAPRINE HCL 10 MG
10 TABLET ORAL 3 TIMES DAILY PRN
Status: DISCONTINUED | OUTPATIENT
Start: 2022-09-13 | End: 2022-09-15 | Stop reason: HOSPADM

## 2022-09-13 RX ORDER — FLUTICASONE PROPIONATE 50 MCG
2 SPRAY, SUSPENSION (ML) NASAL DAILY
Status: DISCONTINUED | OUTPATIENT
Start: 2022-09-13 | End: 2022-09-15 | Stop reason: HOSPADM

## 2022-09-13 RX ORDER — ACETAMINOPHEN 650 MG/1
650 SUPPOSITORY RECTAL EVERY 4 HOURS PRN
Status: DISCONTINUED | OUTPATIENT
Start: 2022-09-13 | End: 2022-09-14

## 2022-09-13 RX ADMIN — PANTOPRAZOLE SODIUM 40 MG: 40 INJECTION, POWDER, FOR SOLUTION INTRAVENOUS at 18:34

## 2022-09-13 RX ADMIN — NEBIVOLOL 5 MG: 2.5 TABLET ORAL at 22:48

## 2022-09-13 RX ADMIN — LOSARTAN POTASSIUM: 50 TABLET, FILM COATED ORAL at 22:48

## 2022-09-13 RX ADMIN — CETIRIZINE HYDROCHLORIDE 10 MG: 10 TABLET, FILM COATED ORAL at 22:47

## 2022-09-13 RX ADMIN — SODIUM CHLORIDE 500 ML: 9 INJECTION, SOLUTION INTRAVENOUS at 18:34

## 2022-09-13 RX ADMIN — BUPROPION HYDROCHLORIDE 300 MG: 150 TABLET, EXTENDED RELEASE ORAL at 22:47

## 2022-09-13 RX ADMIN — ISOSORBIDE MONONITRATE 30 MG: 30 TABLET, EXTENDED RELEASE ORAL at 22:47

## 2022-09-13 RX ADMIN — Medication 10 ML: at 22:49

## 2022-09-13 RX ADMIN — SODIUM CHLORIDE 100 ML/HR: 9 INJECTION, SOLUTION INTRAVENOUS at 23:30

## 2022-09-13 RX ADMIN — ATORVASTATIN CALCIUM 40 MG: 40 TABLET, FILM COATED ORAL at 22:49

## 2022-09-13 NOTE — ED PROVIDER NOTES
Time: 6:20 PM EDT  Arrived by: private car  Chief Complaint: Melena  History provided by: patient  History is limited by: N/A     History of Present Illness:  Patient is a 62 y.o. year old female who presents to the emergency department with melena.  Patient states she developed melena on .  Patient has been having 2-3 bloody bowel movements that are just melena 3 days.  Patient denies any vomiting.  Patient has a slight burning in her epigastric area when she eats but denies any abdominal pain.  Patient had gastric bypass surgery in .  Patient had melena in March of this year and had an EGD and a colonoscopy done by Dr. Cabrera.  Patient has had no Protonix at home.  Patient takes Brilinta and aspirin for prior stents.      Similar Symptoms Previously: 3/22      Patient Care Team  Primary Care Provider: Delfin Bhandari DO    Past Medical History:     Allergies   Allergen Reactions   • Shellfish Allergy Anaphylaxis   • Shellfish-Derived Products Anaphylaxis     Throat swelling   • Penicillins Unknown - High Severity     As baby        Past Medical History:   Diagnosis Date   • Allergic    • Angina at rest (HCC)    • Anxiety    • Bursitis of left shoulder 2018   • CAD (coronary artery disease) 2015   • Chronic allergic rhinitis    • DDD (degenerative disc disease), lumbar 2016   • GERD (gastroesophageal reflux disease) 2016   • History of coronary artery stent placement    • HLD (hyperlipidemia)    • HTN (hypertension)    • IBS (irritable bowel syndrome) 2016   • MDD (major depressive disorder)    • Nondisplaced fracture of proximal end of right fibula 2017   • OA (osteoarthritis)    • Obesity (BMI 30-39.9)    • Seasonal allergies      Past Surgical History:   Procedure Laterality Date   • CARDIAC SURGERY     •  SECTION     • COLONOSCOPY     • COLONOSCOPY N/A 3/2/2022    Procedure: COLONOSCOPY;  Surgeon: Santana Cabrera MD;  Location: [facility] ENDOSCOPY;   Service: Gastroenterology;  Laterality: N/A;  diverticulosis   • CORONARY ANGIOPLASTY WITH STENT PLACEMENT     • ENDOSCOPY N/A 3/1/2022    Procedure: ESOPHAGOGASTRODUODENOSCOPY WITH BIOPSY;  Surgeon: Santana Cabrera MD;  Location: [facility] ENDOSCOPY;  Service: Gastroenterology;  Laterality: N/A;  PREVIOUS SURGERY/GASTRIC EROSIONS   • EYE SURGERY      Implant    • HYSTERECTOMY  2009   • LAPAROSCOPIC GASTRIC BANDING  2008   • TONSILLECTOMY       Family History   Problem Relation Age of Onset   • Heart disease Mother    • Diabetes Mother    • Arthritis Mother    • Osteoporosis Mother    • Heart disease Father    • Diabetes Father    • Arthritis Father    • Stroke Sister    • Heart disease Other    • Diabetes Other        Home Medications:  Prior to Admission medications    Medication Sig Start Date End Date Taking? Authorizing Provider   aspirin 81 MG chewable tablet Chew 81 mg Daily.    Anshu, Nurse Brunilda RN   buPROPion XL (WELLBUTRIN XL) 300 MG 24 hr tablet TAKE 1 TABLET BY MOUTH  DAILY 2/25/22   Delfin Bhandari DO   cetirizine (zyrTEC) 10 MG tablet TAKE 1 TABLET DAILY 1/3/22   Delfin Bhandari DO   cyclobenzaprine (FLEXERIL) 10 MG tablet Take 1 tablet by mouth 3 (Three) Times a Day As Needed for Muscle Spasms. 3/18/22   Delfin Bhandari DO   estradiol (VIVELLE-DOT) 0.1 MG/24HR patch Place 1 patch on the skin as directed by provider 2 (Two) Times a Week.    Provider, MD Haritha   ezetimibe-simvastatin (VYTORIN) 10-40 MG per tablet Take 1 tablet by mouth Every Night.    Anshu, Nurse Brunilda RN   fluticasone (FLONASE) 50 MCG/ACT nasal spray 2 sprays into the nostril(s) as directed by provider Daily. 3/18/22   Delfin Bhandari DO   irbesartan-hydrochlorothiazide (AVALIDE) 300-12.5 MG tablet TAKE 1 TABLET DAILY (NEED APPOINTMENT) 7/27/21 3/7/22  Nurse Brunilda Brasher RN   isosorbide mononitrate (IMDUR) 30 MG 24 hr tablet Take 30 mg by mouth Daily. 8/9/21   ProviderHaritha MD   nebivolol (BYSTOLIC) 5 MG  tablet Take 5 mg by mouth Daily.    Emergency, Nurse Brunilda, RN   nitroglycerin (NITROSTAT) 0.4 MG SL tablet Place 0.4 mg under the tongue Every 5 (Five) Minutes As Needed for Chest Pain. Take no more than 3 doses in 15 minutes.    Provider, MD Haritha   omeprazole (priLOSEC) 40 MG capsule Take 1 capsule by mouth 2 (Two) Times a Day. Indications: Gastrointestinal Ulcer due to Stress 3/7/22   Delfin Bhandari DO   sucralfate (CARAFATE) 1 GM/10ML suspension SHAKE LIQUID AND TAKE 10 ML BY MOUTH FOUR TIMES DAILY BEFORE MEALS AND AT BEDTIME 3/8/22   ProviderHaritha MD   ticagrelor (BRILINTA) 90 MG tablet tablet Take 90 mg by mouth 2 (Two) Times a Day. 5/13/21   Emergency, Nurse Brunilda, RN        Social History:   Social History     Tobacco Use   • Smoking status: Former Smoker     Packs/day: 0.50     Years: 25.00     Pack years: 12.50     Start date: 1982     Quit date: 2007     Years since quitting: 15.7   • Smokeless tobacco: Never Used   • Tobacco comment: QUIT 15 YEARS AGO   Vaping Use   • Vaping Use: Never used   Substance Use Topics   • Alcohol use: Yes     Comment: Occ; has been drinking for 31 or more years   • Drug use: Never     Recent travel: no     Review of Systems:  Review of Systems   Constitutional: Negative for chills and fever.   HENT: Negative for congestion, rhinorrhea and sore throat.    Eyes: Negative for pain and visual disturbance.   Respiratory: Negative for apnea, cough, chest tightness and shortness of breath.    Cardiovascular: Negative for chest pain and palpitations.   Gastrointestinal: Positive for blood in stool. Negative for abdominal pain, diarrhea, nausea and vomiting.   Genitourinary: Negative for difficulty urinating and dysuria.   Musculoskeletal: Negative for joint swelling and myalgias.   Skin: Negative for color change.   Neurological: Negative for seizures and headaches.   Psychiatric/Behavioral: Negative.    All other systems reviewed and are negative.       Physical  "Exam:  /64 (BP Location: Left arm, Patient Position: Sitting)   Pulse 68   Temp 97.7 °F (36.5 °C) (Oral)   Resp 18   Ht 172.7 cm (68\")   Wt 86.2 kg (190 lb 0.6 oz)   SpO2 98%   BMI 28.89 kg/m²     Physical Exam  Vitals and nursing note reviewed.   Constitutional:       General: She is not in acute distress.     Appearance: Normal appearance. She is not toxic-appearing.   HENT:      Head: Normocephalic and atraumatic.      Jaw: There is normal jaw occlusion.   Eyes:      General: Lids are normal.      Extraocular Movements: Extraocular movements intact.      Conjunctiva/sclera: Conjunctivae normal.      Pupils: Pupils are equal, round, and reactive to light.   Cardiovascular:      Rate and Rhythm: Normal rate and regular rhythm.      Pulses: Normal pulses.      Heart sounds: Normal heart sounds.   Pulmonary:      Effort: Pulmonary effort is normal. No respiratory distress.      Breath sounds: Normal breath sounds. No wheezing or rhonchi.   Abdominal:      General: Abdomen is flat.      Palpations: Abdomen is soft.      Tenderness: There is no abdominal tenderness. There is no guarding or rebound.   Musculoskeletal:         General: Normal range of motion.      Cervical back: Normal range of motion and neck supple.      Right lower leg: No edema.      Left lower leg: No edema.   Skin:     General: Skin is warm and dry.   Neurological:      Mental Status: She is alert and oriented to person, place, and time. Mental status is at baseline.   Psychiatric:         Mood and Affect: Mood normal.                Medications in the Emergency Department:  Medications   sodium chloride 0.9 % flush 10 mL (has no administration in time range)   nitroglycerin (NITROSTAT) SL tablet 0.4 mg (has no administration in time range)   sodium chloride 0.9 % flush 10 mL (has no administration in time range)   sodium chloride 0.9 % flush 10 mL (10 mL Intravenous Given 9/13/22 6171)   sodium chloride 0.9 % infusion 40 mL (has no " administration in time range)   sodium chloride 0.9 % infusion (has no administration in time range)   acetaminophen (TYLENOL) tablet 650 mg (has no administration in time range)     Or   acetaminophen (TYLENOL) 160 MG/5ML solution 650 mg (has no administration in time range)     Or   acetaminophen (TYLENOL) suppository 650 mg (has no administration in time range)   HYDROcodone-acetaminophen (NORCO) 5-325 MG per tablet 1 tablet (has no administration in time range)   morphine injection 1 mg (has no administration in time range)     And   naloxone (NARCAN) injection 0.4 mg (has no administration in time range)   sennosides-docusate (PERICOLACE) 8.6-50 MG per tablet 2 tablet (2 tablets Oral Not Given 9/13/22 2249)     And   polyethylene glycol (MIRALAX) packet 17 g (has no administration in time range)     And   bisacodyl (DULCOLAX) EC tablet 5 mg (has no administration in time range)     And   bisacodyl (DULCOLAX) suppository 10 mg (has no administration in time range)   melatonin tablet 5 mg (has no administration in time range)   ondansetron (ZOFRAN) tablet 4 mg (has no administration in time range)     Or   ondansetron (ZOFRAN) injection 4 mg (has no administration in time range)   buPROPion XL (WELLBUTRIN XL) 24 hr tablet 300 mg (300 mg Oral Given 9/13/22 2247)   cetirizine (zyrTEC) tablet 10 mg (10 mg Oral Given 9/13/22 2247)   cyclobenzaprine (FLEXERIL) tablet 10 mg (has no administration in time range)   fluticasone (FLONASE) 50 MCG/ACT nasal spray 2 spray (has no administration in time range)   atorvastatin (LIPITOR) tablet 40 mg (40 mg Oral Given 9/13/22 2249)   isosorbide mononitrate (IMDUR) 24 hr tablet 30 mg (30 mg Oral Given 9/13/22 2247)   nebivolol (BYSTOLIC) tablet 5 mg (5 mg Oral Given 9/13/22 2248)   losartan (COZAAR) 100 mg, hydroCHLOROthiazide (HYDRODIURIL) 12.5 mg ( Oral Given 9/13/22 2248)   pantoprazole (PROTONIX) 40 mg in 100mL NS IVPB (has no administration in time range)   pantoprazole  (PROTONIX) injection 40 mg (40 mg Intravenous Given 9/13/22 1834)   sodium chloride 0.9 % bolus 500 mL (500 mL Intravenous New Bag 9/13/22 1834)        Labs  Lab Results (last 24 hours)     Procedure Component Value Units Date/Time    CBC & Differential [[id number]]  (Abnormal) Collected: 09/13/22 1730    Specimen: Blood from Arm, Right Updated: 09/13/22 1744    Narrative:      The following orders were created for panel order CBC & Differential.  Procedure                               Abnormality         Status                     ---------                               -----------         ------                     CBC Auto Differential[[id number]]        Abnormal            Final result                 Please view results for these tests on the individual orders.    Comprehensive Metabolic Panel [[id number]]  (Abnormal) Collected: 09/13/22 1730    Specimen: Blood from Arm, Right Updated: 09/13/22 1800     Glucose 103 mg/dL      BUN 17 mg/dL      Creatinine 0.69 mg/dL      Sodium 139 mmol/L      Potassium 3.8 mmol/L      Comment: Slight hemolysis detected by analyzer. Results may be affected.        Chloride 105 mmol/L      CO2 25.0 mmol/L      Calcium 8.6 mg/dL      Total Protein 6.5 g/dL      Albumin 4.00 g/dL      ALT (SGPT) 20 U/L      AST (SGOT) 24 U/L      Alkaline Phosphatase 101 U/L      Total Bilirubin 0.2 mg/dL      Globulin 2.5 gm/dL      A/G Ratio 1.6 g/dL      BUN/Creatinine Ratio 24.6     Anion Gap 9.0 mmol/L      eGFR 99.5 mL/min/1.73      Comment: National Kidney Foundation and American Society of Nephrology (ASN) Task Force recommended calculation based on the Chronic Kidney Disease Epidemiology Collaboration (CKD-EPI) equation refit without adjustment for race.       Narrative:      GFR Normal >60  Chronic Kidney Disease <60  Kidney Failure <15      Lactic Acid, Plasma [[id number]]  (Normal) Collected: 09/13/22 1730    Specimen: Blood from Arm, Right Updated: 09/13/22 1758     Lactate 0.7 mmol/L      CBC Auto Differential [[id number]]  (Abnormal) Collected: 09/13/22 1730    Specimen: Blood from Arm, Right Updated: 09/13/22 1744     WBC 9.97 10*3/mm3      RBC 3.40 10*6/mm3      Hemoglobin 10.5 g/dL      Hematocrit 32.2 %      MCV 94.7 fL      MCH 30.9 pg      MCHC 32.6 g/dL      RDW 14.1 %      RDW-SD 49.3 fl      MPV 10.2 fL      Platelets 288 10*3/mm3      Neutrophil % 57.5 %      Lymphocyte % 33.5 %      Monocyte % 5.2 %      Eosinophil % 3.0 %      Basophil % 0.4 %      Immature Grans % 0.4 %      Neutrophils, Absolute 5.73 10*3/mm3      Lymphocytes, Absolute 3.34 10*3/mm3      Monocytes, Absolute 0.52 10*3/mm3      Eosinophils, Absolute 0.30 10*3/mm3      Basophils, Absolute 0.04 10*3/mm3      Immature Grans, Absolute 0.04 10*3/mm3      nRBC 0.0 /100 WBC            Imaging:  No Radiology Exams Resulted Within Past 24 Hours    Procedures:  Procedures    Progress  ED Course as of 09/13/22 2259   Tue Sep 13, 2022   1822 I spoke with Dr. Cabrera who recommends the patient be admitted for serial H&H and a possible EGD/colonoscopy tomorrow. [LQ]      ED Course User Index  [LQ] Jolynn Og MD                            Medical Decision Making:  MDM  Number of Diagnoses or Management Options  Melena  Diagnosis management comments: Patient given Protonix and fluids.  No active bleeding at this time.  Patient hemodynamically stable.  Hemoglobin 10.5 unclear what her baseline is.  Plan for admission and continued H&H.       Final diagnoses:   Melena        Disposition:  ED Disposition     ED Disposition   Decision to Admit    Condition   --    Comment   Level of Care: Med/Surg [1]   Diagnosis: GI bleed [[id number]]   Admitting Physician: HANNAH RG [[id number]]   Attending Physician: HANNAH RG [[id number]]               This medical record created using voice recognition software.           Jolynn Og MD  09/13/22 2071

## 2022-09-13 NOTE — H&P
[facility]  Date: 2022   Patient Name: Viviana Hodges  : 1960  MRN: [id number]  Primary Care Physician:  Delfin Bhandari DO  Date of admission: 2022    Subjective  Melena  Subjective   Chief Complaint: Melena     HPI: Patient is a 62-year-old female who presents to the emergency room with melena. Patient has a past medical history of CAD with stenting, GERD, dyslipidemia, and obesity status post gastric bypass.      Patient states that she has been having bloody bowel movements for the past 2 to 3 days.  Additionally, she complains of epigastric pain.  She had bypass surgery in 2018.    Patient has seen Dr. Cabrera in the past.  She was in the hospital for GI bleed on 3/3/2022 for melena.  Patient had an EGD which was unrevealing for the source of bleeding.  At that time, GI recommended she follow-up with her bariatric surgeon for possible visualization of her surgery should she continue to have issues with bleeding.    Her temperature is 97.6, temperature 77, respiratory rate is 18, blood pressure is 125/77, saturating 100% on room air.  Patient's hemoglobin is 10.5.  She is on aspirin and Brilinta for stents.    Dr. Og discussed the case with Dr. Cabrera who will likely scope the patient.  Personal History     Past Medical History:  Past Medical History:   Diagnosis Date   • Allergic    • Angina at rest (HCC)    • Anxiety    • Bursitis of left shoulder 2018   • CAD (coronary artery disease) 2015   • Chronic allergic rhinitis    • DDD (degenerative disc disease), lumbar 2016   • GERD (gastroesophageal reflux disease) 2016   • History of coronary artery stent placement    • HLD (hyperlipidemia)    • HTN (hypertension)    • IBS (irritable bowel syndrome) 2016   • MDD (major depressive disorder)    • Nondisplaced fracture of proximal end of right fibula 2017   • OA (osteoarthritis)    • Obesity (BMI 30-39.9)    •  Seasonal allergies          Past Surgical History:  Past Surgical History:   Procedure Laterality Date   • CARDIAC SURGERY     •  SECTION     • COLONOSCOPY     • COLONOSCOPY N/A 3/2/2022    Procedure: COLONOSCOPY;  Surgeon: Santana Cabrera MD;  Location: [facility] ENDOSCOPY;  Service: Gastroenterology;  Laterality: N/A;  diverticulosis   • CORONARY ANGIOPLASTY WITH STENT PLACEMENT     • ENDOSCOPY N/A 3/1/2022    Procedure: ESOPHAGOGASTRODUODENOSCOPY WITH BIOPSY;  Surgeon: Santana Cabrera MD;  Location: [facility] ENDOSCOPY;  Service: Gastroenterology;  Laterality: N/A;  PREVIOUS SURGERY/GASTRIC EROSIONS   • EYE SURGERY      Implant    • HYSTERECTOMY     • LAPAROSCOPIC GASTRIC BANDING     • TONSILLECTOMY         Family History:   Breast Cancer-related family history is not on file.      Social History:   Social History     Socioeconomic History   • Marital status:    Tobacco Use   • Smoking status: Former Smoker     Packs/day: 0.50     Years: 25.00     Pack years: 12.50     Start date:      Quit date:      Years since quitting: 15.7   • Smokeless tobacco: Never Used   • Tobacco comment: QUIT 15 YEARS AGO   Vaping Use   • Vaping Use: Never used   Substance and Sexual Activity   • Alcohol use: Yes     Comment: Occ; has been drinking for 31 or more years   • Drug use: Never         Home Medications:  aspirin, buPROPion XL, cetirizine, cyclobenzaprine, estradiol, ezetimibe-simvastatin, fluticasone, irbesartan-hydrochlorothiazide, isosorbide mononitrate, nebivolol, nitroglycerin, omeprazole, sucralfate, and ticagrelor    Allergies:  Allergies   Allergen Reactions   • Shellfish Allergy Anaphylaxis   • Shellfish-Derived Products Anaphylaxis     Throat swelling   • Penicillins Unknown - High Severity     As baby          Review of Systems   All systems were reviewed and negative except for: Positive for melena    Objective   Objective     Vitals:   Temp:  [97.6 °F (36.4 °C)] 97.6 °F  (36.4 °C)  Heart Rate:  [65-77] 65  Resp:  [18] 18  BP: (125-133)/(77-82) 129/82    Physical Exam    Constitutional: Awake, alert, no acute distress   Eyes: Pupils equal, sclerae anicteric, no conjunctival injection   HENT: NCAT, mucous membranes moist   Neck: Supple, no thyromegaly, no lymphadenopathy, trachea midline   Respiratory: Clear to auscultation bilaterally, nonlabored respirations    Cardiovascular: RRR, no murmurs, rubs, or gallops, palpable pedal pulses bilaterally   Gastrointestinal: Positive bowel sounds, soft, nontender, nondistended   Musculoskeletal: No bilateral ankle edema, no clubbing or cyanosis to extremities   Psychiatric: Appropriate affect, cooperative   Neurologic: Oriented x 3, strength symmetric in all extremities, Cranial Nerves grossly intact to confrontation, speech clear   Skin: No rashes     Result Review    Result Review:  I have personally reviewed the results from the time of this admission to 9/13/2022 19:21 EDT and agree with these findings:  [x]  Laboratory  []  Microbiology  [x]  Radiology  []  EKG/Telemetry   []  Cardiology/Vascular   []  Pathology  [x]  Old records  []  Other:      Assessment & Plan   Assessment / Plan   #1 concern for GI bleed  -Started on Protonix drip  -IV fluids  -Monitor hemoglobin    #2 hyperlipidemia  -Continue Lipitor    #3 depression and anxiety  -Continue BuSpar    #4 allergic rhinitis continue Flonase and Zyrtec    #5 CAD with stents  -Hold aspirin and Brilinta because of #1.  Continue Imdur, Bystolic.    #6 hypertension continue losartan, HCTZ.    DVT prophylaxis:  Mechanical DVT prophylaxis orders are present.    CODE STATUS:     Full code    Admission Status:  I believe this patient meets observation status.    Electronically signed by Donavan Valencia DO, 09/13/22, 7:21 PM EDT.

## 2022-09-14 ENCOUNTER — ANESTHESIA (OUTPATIENT)
Dept: GASTROENTEROLOGY | Facility: HOSPITAL | Age: 62
End: 2022-09-14

## 2022-09-14 ENCOUNTER — ANESTHESIA EVENT (OUTPATIENT)
Dept: GASTROENTEROLOGY | Facility: HOSPITAL | Age: 62
End: 2022-09-14

## 2022-09-14 LAB
ANION GAP SERPL CALCULATED.3IONS-SCNC: 4.8 MMOL/L (ref 5–15)
BASOPHILS # BLD AUTO: 0.02 10*3/MM3 (ref 0–0.2)
BASOPHILS NFR BLD AUTO: 0.3 % (ref 0–1.5)
BUN SERPL-MCNC: 15 MG/DL (ref 8–23)
BUN/CREAT SERPL: 26.3 (ref 7–25)
CALCIUM SPEC-SCNC: 8.2 MG/DL (ref 8.6–10.5)
CHLORIDE SERPL-SCNC: 107 MMOL/L (ref 98–107)
CO2 SERPL-SCNC: 26.2 MMOL/L (ref 22–29)
CREAT SERPL-MCNC: 0.57 MG/DL (ref 0.57–1)
DEPRECATED RDW RBC AUTO: 48.8 FL (ref 37–54)
EGFRCR SERPLBLD CKD-EPI 2021: 104.2 ML/MIN/1.73
EOSINOPHIL # BLD AUTO: 0.28 10*3/MM3 (ref 0–0.4)
EOSINOPHIL NFR BLD AUTO: 3.8 % (ref 0.3–6.2)
ERYTHROCYTE [DISTWIDTH] IN BLOOD BY AUTOMATED COUNT: 14.2 % (ref 12.3–15.4)
GLUCOSE BLDC GLUCOMTR-MCNC: 97 MG/DL (ref 70–99)
GLUCOSE BLDC GLUCOMTR-MCNC: 98 MG/DL (ref 70–99)
GLUCOSE SERPL-MCNC: 101 MG/DL (ref 65–99)
HCT VFR BLD AUTO: 25.9 % (ref 34–46.6)
HGB BLD-MCNC: 8.3 G/DL (ref 12–15.9)
IMM GRANULOCYTES # BLD AUTO: 0.04 10*3/MM3 (ref 0–0.05)
IMM GRANULOCYTES NFR BLD AUTO: 0.5 % (ref 0–0.5)
LYMPHOCYTES # BLD AUTO: 3 10*3/MM3 (ref 0.7–3.1)
LYMPHOCYTES NFR BLD AUTO: 41.1 % (ref 19.6–45.3)
MAGNESIUM SERPL-MCNC: 1.8 MG/DL (ref 1.6–2.4)
MCH RBC QN AUTO: 30.5 PG (ref 26.6–33)
MCHC RBC AUTO-ENTMCNC: 32 G/DL (ref 31.5–35.7)
MCV RBC AUTO: 95.2 FL (ref 79–97)
MONOCYTES # BLD AUTO: 0.56 10*3/MM3 (ref 0.1–0.9)
MONOCYTES NFR BLD AUTO: 7.7 % (ref 5–12)
NEUTROPHILS NFR BLD AUTO: 3.4 10*3/MM3 (ref 1.7–7)
NEUTROPHILS NFR BLD AUTO: 46.6 % (ref 42.7–76)
NRBC BLD AUTO-RTO: 0 /100 WBC (ref 0–0.2)
PHOSPHATE SERPL-MCNC: 3.4 MG/DL (ref 2.5–4.5)
PLATELET # BLD AUTO: 233 10*3/MM3 (ref 140–450)
PMV BLD AUTO: 10.7 FL (ref 6–12)
POTASSIUM SERPL-SCNC: 4 MMOL/L (ref 3.5–5.2)
RBC # BLD AUTO: 2.72 10*6/MM3 (ref 3.77–5.28)
SODIUM SERPL-SCNC: 138 MMOL/L (ref 136–145)
WBC NRBC COR # BLD: 7.3 10*3/MM3 (ref 3.4–10.8)

## 2022-09-14 PROCEDURE — 96366 THER/PROPH/DIAG IV INF ADDON: CPT

## 2022-09-14 PROCEDURE — 82962 GLUCOSE BLOOD TEST: CPT

## 2022-09-14 PROCEDURE — 96365 THER/PROPH/DIAG IV INF INIT: CPT

## 2022-09-14 PROCEDURE — 97165 OT EVAL LOW COMPLEX 30 MIN: CPT

## 2022-09-14 PROCEDURE — G0378 HOSPITAL OBSERVATION PER HR: HCPCS

## 2022-09-14 PROCEDURE — 36415 COLL VENOUS BLD VENIPUNCTURE: CPT | Performed by: HOSPITALIST

## 2022-09-14 PROCEDURE — 99226 PR SBSQ OBSERVATION CARE/DAY 35 MINUTES: CPT | Performed by: INTERNAL MEDICINE

## 2022-09-14 PROCEDURE — 25010000002 ONDANSETRON PER 1 MG: Performed by: INTERNAL MEDICINE

## 2022-09-14 PROCEDURE — 80048 BASIC METABOLIC PNL TOTAL CA: CPT | Performed by: HOSPITALIST

## 2022-09-14 PROCEDURE — 43235 EGD DIAGNOSTIC BRUSH WASH: CPT | Performed by: INTERNAL MEDICINE

## 2022-09-14 PROCEDURE — 25010000002 PROPOFOL 10 MG/ML EMULSION: Performed by: NURSE ANESTHETIST, CERTIFIED REGISTERED

## 2022-09-14 PROCEDURE — 83735 ASSAY OF MAGNESIUM: CPT | Performed by: HOSPITALIST

## 2022-09-14 PROCEDURE — 85025 COMPLETE CBC W/AUTO DIFF WBC: CPT | Performed by: HOSPITALIST

## 2022-09-14 PROCEDURE — 84100 ASSAY OF PHOSPHORUS: CPT | Performed by: HOSPITALIST

## 2022-09-14 PROCEDURE — 97161 PT EVAL LOW COMPLEX 20 MIN: CPT

## 2022-09-14 RX ORDER — PROPOFOL 10 MG/ML
VIAL (ML) INTRAVENOUS AS NEEDED
Status: DISCONTINUED | OUTPATIENT
Start: 2022-09-14 | End: 2022-09-14 | Stop reason: SURG

## 2022-09-14 RX ORDER — ASPIRIN 81 MG/1
81 TABLET, CHEWABLE ORAL DAILY
Status: DISCONTINUED | OUTPATIENT
Start: 2022-09-14 | End: 2022-09-15 | Stop reason: HOSPADM

## 2022-09-14 RX ORDER — LIDOCAINE HYDROCHLORIDE 20 MG/ML
INJECTION, SOLUTION EPIDURAL; INFILTRATION; INTRACAUDAL; PERINEURAL AS NEEDED
Status: DISCONTINUED | OUTPATIENT
Start: 2022-09-14 | End: 2022-09-14 | Stop reason: SURG

## 2022-09-14 RX ORDER — PANTOPRAZOLE SODIUM 40 MG/1
40 TABLET, DELAYED RELEASE ORAL
Status: DISCONTINUED | OUTPATIENT
Start: 2022-09-14 | End: 2022-09-15 | Stop reason: HOSPADM

## 2022-09-14 RX ORDER — SODIUM CHLORIDE, SODIUM LACTATE, POTASSIUM CHLORIDE, CALCIUM CHLORIDE 600; 310; 30; 20 MG/100ML; MG/100ML; MG/100ML; MG/100ML
30 INJECTION, SOLUTION INTRAVENOUS CONTINUOUS
Status: DISCONTINUED | OUTPATIENT
Start: 2022-09-14 | End: 2022-09-14

## 2022-09-14 RX ORDER — EPHEDRINE SULFATE 50 MG/ML
INJECTION, SOLUTION INTRAVENOUS AS NEEDED
Status: DISCONTINUED | OUTPATIENT
Start: 2022-09-14 | End: 2022-09-14 | Stop reason: SURG

## 2022-09-14 RX ORDER — PHENYLEPHRINE HCL IN 0.9% NACL 1 MG/10 ML
SYRINGE (ML) INTRAVENOUS AS NEEDED
Status: DISCONTINUED | OUTPATIENT
Start: 2022-09-14 | End: 2022-09-14 | Stop reason: SURG

## 2022-09-14 RX ADMIN — BUPROPION HYDROCHLORIDE 300 MG: 150 TABLET, EXTENDED RELEASE ORAL at 17:05

## 2022-09-14 RX ADMIN — PANTOPRAZOLE SODIUM 8 MG/HR: 40 INJECTION, POWDER, FOR SOLUTION INTRAVENOUS at 05:36

## 2022-09-14 RX ADMIN — EPHEDRINE SULFATE 5 MG: 50 INJECTION INTRAVENOUS at 14:38

## 2022-09-14 RX ADMIN — ONDANSETRON 4 MG: 2 INJECTION INTRAMUSCULAR; INTRAVENOUS at 20:07

## 2022-09-14 RX ADMIN — ATORVASTATIN CALCIUM 40 MG: 40 TABLET, FILM COATED ORAL at 20:07

## 2022-09-14 RX ADMIN — SODIUM CHLORIDE 100 ML/HR: 9 INJECTION, SOLUTION INTRAVENOUS at 09:58

## 2022-09-14 RX ADMIN — Medication 100 MCG: at 14:38

## 2022-09-14 RX ADMIN — Medication 10 ML: at 20:08

## 2022-09-14 RX ADMIN — LIDOCAINE HYDROCHLORIDE 100 MG: 20 INJECTION, SOLUTION EPIDURAL; INFILTRATION; INTRACAUDAL; PERINEURAL at 14:11

## 2022-09-14 RX ADMIN — CETIRIZINE HYDROCHLORIDE 10 MG: 10 TABLET, FILM COATED ORAL at 17:05

## 2022-09-14 RX ADMIN — EPHEDRINE SULFATE 5 MG: 50 INJECTION INTRAVENOUS at 14:23

## 2022-09-14 RX ADMIN — PROPOFOL 50 MG: 10 INJECTION, EMULSION INTRAVENOUS at 14:11

## 2022-09-14 RX ADMIN — SODIUM CHLORIDE, POTASSIUM CHLORIDE, SODIUM LACTATE AND CALCIUM CHLORIDE: 600; 310; 30; 20 INJECTION, SOLUTION INTRAVENOUS at 14:09

## 2022-09-14 RX ADMIN — PANTOPRAZOLE SODIUM 8 MG/HR: 40 INJECTION, POWDER, FOR SOLUTION INTRAVENOUS at 09:57

## 2022-09-14 RX ADMIN — PROPOFOL 225 MCG/KG/MIN: 10 INJECTION, EMULSION INTRAVENOUS at 14:11

## 2022-09-14 RX ADMIN — EPHEDRINE SULFATE 5 MG: 50 INJECTION INTRAVENOUS at 14:28

## 2022-09-14 RX ADMIN — PANTOPRAZOLE SODIUM 8 MG/HR: 40 INJECTION, POWDER, FOR SOLUTION INTRAVENOUS at 00:01

## 2022-09-14 RX ADMIN — Medication 100 MCG: at 14:32

## 2022-09-14 RX ADMIN — ASPIRIN 81 MG CHEWABLE TABLET 81 MG: 81 TABLET CHEWABLE at 18:18

## 2022-09-14 RX ADMIN — PANTOPRAZOLE SODIUM 40 MG: 40 TABLET, DELAYED RELEASE ORAL at 17:05

## 2022-09-14 RX ADMIN — TICAGRELOR 90 MG: 90 TABLET ORAL at 20:07

## 2022-09-14 NOTE — THERAPY EVALUATION
Patient Name: Viviana Hodges  : 1960    MRN: [id number]                              Today's Date: 2022       Admit Date: 2022    Visit Dx:     ICD-10-CM ICD-9-CM   1. Melena  K92.1 578.1   2. Gastrointestinal hemorrhage associated with gastritis, unspecified gastritis type  K29.71 535.51   3. Decreased activities of daily living (ADL)  Z78.9 V49.89     Patient Active Problem List   Diagnosis   • Gastrointestinal hemorrhage   • Coronary angioplasty status   • Atherosclerosis of coronary artery   • GERD without esophagitis   • HTN (hypertension)   • IBS (irritable bowel syndrome)   • DDD (degenerative disc disease), lumbar   • MDD (major depressive disorder)   • OA (osteoarthritis)   • HLD (hyperlipidemia)   • Chronic allergic rhinitis   • Iron deficiency anemia due to chronic blood loss   • Prediabetes   • Stress ulcer of stomach   • GI bleed     Past Medical History:   Diagnosis Date   • Allergic    • Angina at rest (HCC)    • Anxiety    • Bursitis of left shoulder 2018   • CAD (coronary artery disease) 2015   • Chronic allergic rhinitis    • DDD (degenerative disc disease), lumbar 2016   • GERD (gastroesophageal reflux disease) 2016   • History of coronary artery stent placement    • HLD (hyperlipidemia)    • HTN (hypertension)    • IBS (irritable bowel syndrome) 2016   • MDD (major depressive disorder)    • Nondisplaced fracture of proximal end of right fibula 2017   • OA (osteoarthritis)    • Obesity (BMI 30-39.9)    • Seasonal allergies      Past Surgical History:   Procedure Laterality Date   • CARDIAC SURGERY     •  SECTION     • COLONOSCOPY     • COLONOSCOPY N/A 3/2/2022    Procedure: COLONOSCOPY;  Surgeon: Santana Cabrera MD;  Location: [facility] ENDOSCOPY;  Service: Gastroenterology;  Laterality: N/A;  diverticulosis   • CORONARY ANGIOPLASTY WITH STENT PLACEMENT     • ENDOSCOPY N/A 3/1/2022    Procedure: ESOPHAGOGASTRODUODENOSCOPY  WITH BIOPSY;  Surgeon: Santana Cabrera MD;  Location: [facility] ENDOSCOPY;  Service: Gastroenterology;  Laterality: N/A;  PREVIOUS SURGERY/GASTRIC EROSIONS   • EYE SURGERY      Implant    • HYSTERECTOMY  2009   • LAPAROSCOPIC GASTRIC BANDING  2008   • TONSILLECTOMY        General Information     Row Name 09/14/22 1038          OT Time and Intention    Document Type evaluation  -AV     Mode of Treatment individual therapy;occupational therapy  -AV     Row Name 09/14/22 1038          General Information    Patient Profile Reviewed yes  -AV     Prior Level of Function independent:;ADL's;home management;all household mobility  Independent with all ADL and home management tasks.  She has recently retired from [employer].  She is active and enjoys nishant, sewing etc.  -AV     Existing Precautions/Restrictions NPO  -AV     Barriers to Rehab none identified  -AV     Row Name 09/14/22 1038          Occupational Profile    Reason for Services/Referral (Occupational Profile) Patient is a 62 year old female admitted to [facility] on September 13, 2022 with melena.  She is currently on 3 W./room air.   OT consulted to evaluate for ADL needs.  No previous OT services for current condition.  -AV     Row Name 09/14/22 1038          Living Environment    People in Home spouse  -AV     Row Name 09/14/22 1038          Cognition    Orientation Status (Cognition) --  Patient is alert, pleasant and cooperative- able to retain information and follow commands.  -AV           User Key  (r) = Recorded By, (t) = Taken By, (c) = Cosigned By    Initials Name Provider Type    AV Kunal Justin, OT Occupational Therapist                 Mobility/ADL's     Row Name 09/14/22 1039          Transfers    Comment, (Transfers) Independent  -AV     Row Name 09/14/22 1039          Activities of Daily Living    BADL Assessment/Intervention --  Patient is independent with all basic ADLs at this time.  She reports no difficulty with task completion.   -AV           User Key  (r) = Recorded By, (t) = Taken By, (c) = Cosigned By    Initials Name Provider Type    Kunal Richard OT Occupational Therapist               Obj/Interventions     Row Name 09/14/22 1039          Sensory Assessment (Somatosensory)    Sensory Assessment (Somatosensory) UE sensation intact  -AV     Row Name 09/14/22 1039          Vision Assessment/Intervention    Visual Impairment/Limitations WFL;corrective lenses full-time  -AV     Row Name 09/14/22 1039          Range of Motion Comprehensive    General Range of Motion bilateral upper extremity ROM WFL  AROM  -AV     Row Name 09/14/22 1039          Strength Comprehensive (MMT)    Comment, General Manual Muscle Testing (MMT) Assessment 5/5 bilateral upper extremities  -AV     Row Name 09/14/22 1039          Motor Skills    Motor Skills coordination;functional endurance  -AV     Coordination WFL  Right dominant  -AV     Functional Endurance WFL  -AV     Row Name 09/14/22 1039          Balance    Comment, Balance Independent  -AV           User Key  (r) = Recorded By, (t) = Taken By, (c) = Cosigned By    Initials Name Provider Type    Kunal Richard OT Occupational Therapist               Goals/Plan    No documentation.                Clinical Impression     Row Name 09/14/22 1040          Pain Assessment    Additional Documentation Pain Scale: FACES Pre/Post-Treatment (Group)  -AV     [facility] Name 09/14/22 1040          Pain Scale: FACES Pre/Post-Treatment    Pain: FACES Scale, Pretreatment 0-->no hurt  -AV     Posttreatment Pain Rating 0-->no hurt  -AV     Row Name 09/14/22 1040          Plan of Care Review    Plan of Care Reviewed With patient  -AV     Progress no change  First session: Evaluation  -AV     Outcome Evaluation Skilled OT services are not indicated at this time as patient has had no significant decline in functional status.  DC OT with patient in agreement.  -AV     Row Name 09/14/22 1040          Therapy Assessment/Plan (OT)     Patient/Family Therapy Goal Statement (OT) NA  -AV     Rehab Potential (OT) --  NA  -AV     Criteria for Skilled Therapeutic Interventions Met (OT) no problems identified which require skilled intervention  -AV     Therapy Frequency (OT) evaluation only  -AV     Row Name 09/14/22 1040          Therapy Plan Review/Discharge Plan (OT)    Anticipated Discharge Disposition (OT) home  -AV     Row Name 09/14/22 1040          Vital Signs    O2 Delivery Pre Treatment room air  -AV     O2 Delivery Intra Treatment room air  -AV     O2 Delivery Post Treatment room air  -AV           User Key  (r) = Recorded By, (t) = Taken By, (c) = Cosigned By    Initials Name Provider Type    Kunal Richard OT Occupational Therapist               Outcome Measures     Row Name 09/14/22 1040          How much help from another is currently needed...    Putting on and taking off regular lower body clothing? 4  -AV     Bathing (including washing, rinsing, and drying) 4  -AV     Toileting (which includes using toilet bed pan or urinal) 4  -AV     Putting on and taking off regular upper body clothing 4  -AV     Taking care of personal grooming (such as brushing teeth) 4  -AV     Eating meals 4  -AV     AM-PAC 6 Clicks Score (OT) 24  -AV     Row Name 09/14/22 1040          Functional Assessment    Outcome Measure Options AM-PAC 6 Clicks Daily Activity (OT);Optimal Instrument  -AV     Row Name 09/14/22 1040          Optimal Instrument    Optimal Instrument Optimal - 3  -AV     Bending/Stooping 1  -AV     Standing 1  -AV     Reaching 1  -AV     From the list, choose the 3 activities you would most like to be able to do without any difficulty Bending/stooping;Standing;Reaching  -AV     Total Score Optimal - 3 3  -AV           User Key  (r) = Recorded By, (t) = Taken By, (c) = Cosigned By    Initials Name Provider Type    Kunal Richard OT Occupational Therapist                Occupational Therapy Education                 Title: PT OT SLP  Therapies (In Progress)     Topic: Occupational Therapy (In Progress)     Point: ADL training (Done)     Description:   Instruct learner(s) on proper safety adaptation and remediation techniques during self care or transfers.   Instruct in proper use of assistive devices.              Learning Progress Summary           Patient Acceptance, E, VU by YESI at 9/14/2022 1041    Comment: No need for skilled OT services identified at this time                   Point: Home exercise program (Not Started)     Description:   Instruct learner(s) on appropriate technique for monitoring, assisting and/or progressing therapeutic exercises/activities.              Learner Progress:  Not documented in this visit.          Point: Precautions (Not Started)     Description:   Instruct learner(s) on prescribed precautions during self-care and functional transfers.              Learner Progress:  Not documented in this visit.          Point: Body mechanics (Not Started)     Description:   Instruct learner(s) on proper positioning and spine alignment during self-care, functional mobility activities and/or exercises.              Learner Progress:  Not documented in this visit.                      User Key     Initials Effective Dates Name Provider Type Discipline     06/16/21 -  Kunal Justin OT Occupational Therapist OT              OT Recommendation and Plan  Therapy Frequency (OT): evaluation only  Plan of Care Review  Plan of Care Reviewed With: patient  Progress: no change (First session: Evaluation)  Outcome Evaluation: Skilled OT services are not indicated at this time as patient has had no significant decline in functional status.  DC OT with patient in agreement.     Time Calculation:    Time Calculation- OT     Row Name 09/14/22 1042             Time Calculation- OT    OT Received On 09/14/22  -AV              Untimed Charges    OT Eval/Re-eval Minutes 35  -AV              Total Minutes    Untimed Charges Total Minutes 35   -AV       Total Minutes 35  -AV            User Key  (r) = Recorded By, (t) = Taken By, (c) = Cosigned By    Initials Name Provider Type    Kunal Richard OT Occupational Therapist              Therapy Charges for Today     Code Description Service Date Service Provider Modifiers Qty    [id number]  OT EVAL LOW COMPLEXITY 3 9/14/2022 Kunal Justin OT GO 1               Kunal Justin OT  9/14/2022

## 2022-09-14 NOTE — ANESTHESIA POSTPROCEDURE EVALUATION
Patient: Viviana Hodges    Procedure Summary     Date: 09/14/22 Room / Location: [facility] ENDOSCOPY 4 / [facility] ENDOSCOPY    Anesthesia Start: 1409 Anesthesia Stop: 1448    Procedure: ESOPHAGOGASTRODUODENOSCOPY (N/A ) Diagnosis:       Gastrointestinal hemorrhage associated with gastritis, unspecified gastritis type      (Gastrointestinal hemorrhage associated with gastritis, unspecified gastritis type [K29.71])    Surgeons: Santana Cabrera MD Provider: Pelon Cruz MD    Anesthesia Type: general ASA Status: 3          Anesthesia Type: general    Vitals  No vitals data found for the desired time range.          Post Anesthesia Care and Evaluation    Patient location during evaluation: bedside  Patient participation: complete - patient participated  Level of consciousness: awake    Airway patency: patent  PONV Status: none  Cardiovascular status: acceptable  Respiratory status: acceptable  Hydration status: acceptable

## 2022-09-14 NOTE — THERAPY EVALUATION
Acute Care - Physical Therapy Initial Evaluation   Magdiel     Patient Name: Viviana Hodges  : 1960  MRN: [id number]  Today's Date: 2022      Visit Dx:     ICD-10-CM ICD-9-CM   1. Melena  K92.1 578.1   2. Gastrointestinal hemorrhage associated with gastritis, unspecified gastritis type  K29.71 535.51   3. Decreased activities of daily living (ADL)  Z78.9 V49.89   4. Difficulty walking  R26.2 719.7     Patient Active Problem List   Diagnosis   • Gastrointestinal hemorrhage   • Coronary angioplasty status   • Atherosclerosis of coronary artery   • GERD without esophagitis   • HTN (hypertension)   • IBS (irritable bowel syndrome)   • DDD (degenerative disc disease), lumbar   • MDD (major depressive disorder)   • OA (osteoarthritis)   • HLD (hyperlipidemia)   • Chronic allergic rhinitis   • Iron deficiency anemia due to chronic blood loss   • Prediabetes   • Stress ulcer of stomach   • GI bleed     Past Medical History:   Diagnosis Date   • Allergic    • Angina at rest (HCC)    • Anxiety    • Bleeding ulcer    • Bursitis of left shoulder 2018   • CAD (coronary artery disease) 2015   • Chronic allergic rhinitis    • DDD (degenerative disc disease), lumbar 2016   • GERD (gastroesophageal reflux disease) 2016   • History of coronary artery stent placement    • HLD (hyperlipidemia)    • HTN (hypertension)    • IBS (irritable bowel syndrome) 2016   • MDD (major depressive disorder)    • Nondisplaced fracture of proximal end of right fibula 2017   • OA (osteoarthritis)    • Obesity (BMI 30-39.9)    • Seasonal allergies      Past Surgical History:   Procedure Laterality Date   • CARDIAC SURGERY     •  SECTION     • COLONOSCOPY     • COLONOSCOPY N/A 3/2/2022    Procedure: COLONOSCOPY;  Surgeon: Santana Cabrera MD;  Location: [facility] ENDOSCOPY;  Service: Gastroenterology;  Laterality: N/A;  diverticulosis   • CORONARY ANGIOPLASTY WITH STENT PLACEMENT     •  ENDOSCOPY N/A 3/1/2022    Procedure: ESOPHAGOGASTRODUODENOSCOPY WITH BIOPSY;  Surgeon: Santana Cabrera MD;  Location: [facility] ENDOSCOPY;  Service: Gastroenterology;  Laterality: N/A;  PREVIOUS SURGERY/GASTRIC EROSIONS   • EYE SURGERY      Implant    • HYSTERECTOMY  2009   • LAPAROSCOPIC GASTRIC BANDING  2008   • TONSILLECTOMY       PT Assessment (last 12 hours)     PT Evaluation and Treatment     Row Name 09/14/22 1200          Physical Therapy Time and Intention    Subjective Information no complaints  -DP     Document Type evaluation  -DP     Mode of Treatment individual therapy;physical therapy  -DP     Patient Effort good  -DP     Row Name 09/14/22 1200          General Information    Patient Profile Reviewed yes  -DP     Patient Observations alert;cooperative;agree to therapy  -DP     Prior Level of Function independent:;gait;transfer;bed mobility;ADL's  -DP     Equipment Currently Used at Home none  -DP     Existing Precautions/Restrictions no known precautions/restrictions  -DP     Row Name 09/14/22 1200          Living Environment    Current Living Arrangements home  -DP     Home Accessibility stairs to enter home  -DP     People in Home spouse  -DP     Row Name 09/14/22 1200          Home Main Entrance    Number of Stairs, Main Entrance three  -DP     Row Name 09/14/22 1200          Range of Motion (ROM)    Range of Motion bilateral lower extremities;ROM is WFL  -DP     Row Name 09/14/22 1200          Strength (Manual Muscle Testing)    Strength (Manual Muscle Testing) bilateral lower extremities;strength is WFL  -DP     Row Name 09/14/22 1200          Bed Mobility    Bed Mobility supine-sit  -DP     Supine-Sit [place] (Bed Mobility) modified independence  -DP     Row Name 09/14/22 1200          Transfers    Transfers sit-stand transfer  -DP     Sit-Stand [place] (Transfers) modified independence  -DP     Row Name 09/14/22 1200          Gait/Stairs (Locomotion)    Gait/Stairs Locomotion  gait/ambulation assistive device  -DP     [place] Level (Gait) modified independence  -DP     Assistive Device (Gait) other (see comments)  none  -DP     Comment, (Gait/Stairs) able to ambulate ad jeet in her room  -DP     Row Name 09/14/22 1200          Balance    Balance Assessment standing dynamic balance  -DP     Dynamic Standing Balance modified independence  -DP     Row Name 09/14/22 1200          Plan of Care Review    Plan of Care Reviewed With patient  -DP     Outcome Evaluation Patient presents with known deficits with functional mobility.  She is able to complete all transfers independently and ambulate ad jeet. in her room.  She does not need inpatient PT services.  -DP     Row Name 09/14/22 1200          Therapy Assessment/Plan (PT)    Criteria for Skilled Interventions Met (PT) no problems identified which require skilled intervention  -DP     Therapy Frequency (PT) evaluation only  -DP     Row Name 09/14/22 1200          PT Evaluation Complexity    History, PT Evaluation Complexity no personal factors and/or comorbidities  -DP     Examination of Body Systems (PT Eval Complexity) total of 4 or more elements  -DP     Clinical Presentation (PT Evaluation Complexity) stable  -DP     Clinical Decision Making (PT Evaluation Complexity) low complexity  -DP     Overall Complexity (PT Evaluation Complexity) low complexity  -DP           User Key  (r) = Recorded By, (t) = Taken By, (c) = Cosigned By    Initials Name Provider Type    Kamlesh Garcia, PT Physical Therapist                  PT Recommendation and Plan  Anticipated Discharge Disposition (PT): home with assist  Therapy Frequency (PT): evaluation only  Plan of Care Reviewed With: patient  Outcome Evaluation: Patient presents with known deficits with functional mobility.  She is able to complete all transfers independently and ambulate ad jeet. in her room.  She does not need inpatient PT services.   Outcome Measures     Row Name 09/14/22 1400              How much help from another person do you currently need...    Turning from your back to your side while in flat bed without using bedrails? 4  -DP      Moving from lying on back to sitting on the side of a flat bed without bedrails? 4  -DP      Moving to and from a bed to a chair (including a wheelchair)? 4  -DP      Standing up from a chair using your arms (e.g., wheelchair, bedside chair)? 4  -DP      Climbing 3-5 steps with a railing? 4  -DP      To walk in hospital room? 4  -DP      AM-PAC 6 Clicks Score (PT) 24  -DP              Functional Assessment    Outcome Measure Options AM-PAC 6 Clicks Basic Mobility (PT)  -DP            User Key  (r) = Recorded By, (t) = Taken By, (c) = Cosigned By    Initials Name Provider Type    Kamlesh Garcia, PT Physical Therapist                 Time Calculation:    PT Charges     Row Name 09/14/22 1414             Time Calculation    PT Received On 09/14/22  -DP              Untimed Charges    PT Eval/Re-eval Minutes 40  -DP              Total Minutes    Untimed Charges Total Minutes 40  -DP       Total Minutes 40  -DP            User Key  (r) = Recorded By, (t) = Taken By, (c) = Cosigned By    Initials Name Provider Type    Kamlesh Garcia, PT Physical Therapist              Therapy Charges for Today     Code Description Service Date Service Provider Modifiers Qty    27368924085 HC PT EVAL LOW COMPLEXITY 3 9/14/2022 Kamlesh Valencia, PT GP 1          PT G-Codes  Outcome Measure Options: AM-PAC 6 Clicks Basic Mobility (PT)  AM-PAC 6 Clicks Score (PT): 24  AM-PAC 6 Clicks Score (OT): 24    Kamlesh Valencia PT  9/14/2022

## 2022-09-14 NOTE — PROGRESS NOTES
[facility]  Date: 9/14/2022  Patient Name: Viviana Hodges    Subjective   Subjective     Chief Complaint:   Melena    Interval Followup: EGD negative.  Brilinta and aspirin have been on hold since admission.  Patient agreeable to local follow-up.  No bowel movement today, hemoglobin has downtrending since admission.  Patient's stent placement was in 2019 at [facility] with Dr. Wing.  Has been taking omeprazole at home    Review of Systems   No nausea vomiting no chest pain    Objective   Objective     Vitals:   Temp:  [97 °F (36.1 °C)-98 °F (36.7 °C)] 97 °F (36.1 °C)  Heart Rate:  [60-92] 70  Resp:  [13-19] 18  BP: ()/(47-93) 102/73  Physical Exam   Gen: NAD, WDWN   HEENT: NCAT, mmm  Resp: CTAB no wrr, no dyspnea  CV: RRR no mrg, no LE edema  GI: Abdomen soft NT, ND +bs  Psych: AOx3, normal mood and affect      Result Review    Result Review:  I have personally reviewed the results from 9/14/2022 18:03 EDT and agree with these findings:  [x]  Laboratory   Hemoglobin 10.5 down to 8.3  []  Microbiology  []  Radiology  []  EKG/Telemetry   []  Cardiology/Vascular   []  Pathology  []  Old records  []  Other:    Assessment & Plan   Assessment / Plan     Assessment/Plan:  • Melena, GI bleed  • Acute blood loss anemia  • Hyperlipidemia  • Depression anxiety  • Allergic rhinitis  • CAD with stenting  • Hypertension    Plan:   • PPI p.o. twice daily per GI  • CBC in a.m. to ensure no more blood loss  • Resuming aspirin and Brilinta due to CAD  • Continue home antihypertensives  • Continue Flonase Zyrtec  • Continue BuSpar and Lipitor  • Plan for follow-up [facility] due to advanced endoscopy techniques available     DVT ppx: SCDs  Diet: Clear liquid  Discussed with bedside RN and GI

## 2022-09-14 NOTE — PLAN OF CARE
Goal Outcome Evaluation:           Progress: improving  Outcome Evaluation: NS@ 100 and Protonix drip dc'd post endoscopy. Pt returned from endoscopy procedure with no signs of distress, VSS, clear liquid diet ordered. PO Protonix ordered and ASA and Brilinta restarted per Dr De La Rosa. No complaints/concerns voiced.

## 2022-09-14 NOTE — ANESTHESIA PREPROCEDURE EVALUATION
Anesthesia Evaluation     Patient summary reviewed and Nursing notes reviewed                Airway   Mallampati: I  TM distance: >3 FB  Neck ROM: full  No difficulty expected  Dental      Pulmonary - negative pulmonary ROS and normal exam    breath sounds clear to auscultation  Cardiovascular - normal exam    Rhythm: regular  Rate: normal    (+) hypertension, CAD, angina, hyperlipidemia,       Neuro/Psych  (+) psychiatric history,    GI/Hepatic/Renal/Endo    (+)  GERD, PUD, GI bleeding ,     Musculoskeletal     Abdominal    Substance History - negative use     OB/GYN negative ob/gyn ROS         Other   arthritis,                      Anesthesia Plan    ASA 3     general     intravenous induction     Anesthetic plan, risks, benefits, and alternatives have been provided, discussed and informed consent has been obtained with: patient.        CODE STATUS:

## 2022-09-14 NOTE — PLAN OF CARE
Goal Outcome Evaluation:  Plan of Care Reviewed With: patient        Progress: no change (First session: Evaluation)  Outcome Evaluation: Skilled OT services are not indicated at this time as patient has had no significant decline in functional status.  DC OT with patient in agreement.

## 2022-09-14 NOTE — CONSULTS
Chief Complaint  Black or Bloody Stool (Patient has been having black BM since .  Patient has a history of bleeding ulcer.  ) and Weakness - Generalized (Patient has been becoming weak since starting to pass stool. )    History of Present Illness  Viviana Hodges is a 62 y.o. female with a history of coronary artery disease with coronary stents, anxiety, bleeding ulcers in the past, bursitis, GERD, IBS, obesity, bariatric surgery who presents to [facility] ENDO [facility] on referral from No ref. provider found for a gastroenterology evaluation of melena and anemia.  Patient denies any abdominal pain vomiting hematemesis        Labs Result Review Imaging    Past Medical History:   Diagnosis Date   • Allergic    • Angina at rest (HCC)    • Anxiety    • Bleeding ulcer    • Bursitis of left shoulder 2018   • CAD (coronary artery disease) 2015   • Chronic allergic rhinitis    • DDD (degenerative disc disease), lumbar 2016   • GERD (gastroesophageal reflux disease) 2016   • History of coronary artery stent placement    • HLD (hyperlipidemia)    • HTN (hypertension)    • IBS (irritable bowel syndrome) 2016   • MDD (major depressive disorder)    • Nondisplaced fracture of proximal end of right fibula 2017   • OA (osteoarthritis)    • Obesity (BMI 30-39.9)    • Seasonal allergies        Past Surgical History:   Procedure Laterality Date   • CARDIAC SURGERY     •  SECTION     • COLONOSCOPY     • COLONOSCOPY N/A 3/2/2022    Procedure: COLONOSCOPY;  Surgeon: Santana Cabrera MD;  Location: [facility] ENDOSCOPY;  Service: Gastroenterology;  Laterality: N/A;  diverticulosis   • CORONARY ANGIOPLASTY WITH STENT PLACEMENT     • ENDOSCOPY N/A 3/1/2022    Procedure: ESOPHAGOGASTRODUODENOSCOPY WITH BIOPSY;  Surgeon: Santana Cabrera MD;  Location: [facility] ENDOSCOPY;  Service: Gastroenterology;  Laterality: N/A;  PREVIOUS SURGERY/GASTRIC EROSIONS   • EYE SURGERY       Implant    • HYSTERECTOMY  2009   • LAPAROSCOPIC GASTRIC BANDING  2008   • TONSILLECTOMY           Current Facility-Administered Medications:   •  acetaminophen (TYLENOL) tablet 650 mg, 650 mg, Oral, Q4H PRN **OR** [DISCONTINUED] acetaminophen (TYLENOL) 160 MG/5ML solution 650 mg, 650 mg, Oral, Q4H PRN **OR** [DISCONTINUED] acetaminophen (TYLENOL) suppository 650 mg, 650 mg, Rectal, Q4H PRN, Valencia, Dimpi, DO  •  atorvastatin (LIPITOR) tablet 40 mg, 40 mg, Oral, Nightly, Valencia, Dimpi, DO, 40 mg at 09/13/22 2249  •  sennosides-docusate (PERICOLACE) 8.6-50 MG per tablet 2 tablet, 2 tablet, Oral, BID **AND** polyethylene glycol (MIRALAX) packet 17 g, 17 g, Oral, Daily PRN **AND** bisacodyl (DULCOLAX) EC tablet 5 mg, 5 mg, Oral, Daily PRN **AND** bisacodyl (DULCOLAX) suppository 10 mg, 10 mg, Rectal, Daily PRN, Valencia, Dimpi, DO  •  buPROPion XL (WELLBUTRIN XL) 24 hr tablet 300 mg, 300 mg, Oral, Daily, Valencia, Dimpi, DO, 300 mg at 09/13/22 2247  •  cetirizine (zyrTEC) tablet 10 mg, 10 mg, Oral, Daily, Valencia, Dimpi, DO, 10 mg at 09/13/22 2247  •  cyclobenzaprine (FLEXERIL) tablet 10 mg, 10 mg, Oral, TID PRN, Valencia, Dimpi, DO  •  fluticasone (FLONASE) 50 MCG/ACT nasal spray 2 spray, 2 spray, Nasal, Daily, Valencia, Dimpi, DO  •  HYDROcodone-acetaminophen (NORCO) 5-325 MG per tablet 1 tablet, 1 tablet, Oral, Q4H PRN, Valencia, Dimpi, DO  •  isosorbide mononitrate (IMDUR) 24 hr tablet 30 mg, 30 mg, Oral, Daily, Valencia, Dimpi, DO, 30 mg at 09/13/22 2247  •  lactated ringers infusion, 30 mL/hr, Intravenous, Continuous, Durkan, Pelon, MD  •  losartan (COZAAR) 100 mg, hydroCHLOROthiazide (HYDRODIURIL) 12.5 mg, , Oral, Daily, Valencia, Dimpi, DO, Given at 09/13/22 2248  •  melatonin tablet 5 mg, 5 mg, Oral, Nightly PRN, Valencia, Dimpi, DO  •  nebivolol (BYSTOLIC) tablet 5 mg, 5 mg, Oral, Daily, Valencia, Dimpi, DO, 5 mg at 09/13/22 2248  •  ondansetron (ZOFRAN) tablet 4 mg, 4 mg, Oral, Q6H PRN **OR** ondansetron (ZOFRAN) injection 4 mg, 4  "mg, Intravenous, Q6H PRN, Valencia, Dimpi, DO  •  pantoprazole (PROTONIX) 40 mg in 100mL NS IVPB, 8 mg/hr, Intravenous, Continuous, Valencia, Dimpi, DO, Last Rate: 20 mL/hr at 09/14/22 0957, 8 mg/hr at 09/14/22 0957  •  sodium chloride 0.9 % flush 10 mL, 10 mL, Intravenous, PRN, Jolynn Og MD  •  sodium chloride 0.9 % flush 10 mL, 10 mL, Intravenous, PRN, Valencia, Dimpi, DO  •  sodium chloride 0.9 % flush 10 mL, 10 mL, Intravenous, Q12H, Valencia, Dimpi, DO, 10 mL at 09/13/22 2249  •  sodium chloride 0.9 % infusion 40 mL, 40 mL, Intravenous, PRN, Valencia, Dimpi, DO  •  sodium chloride 0.9 % infusion, 100 mL/hr, Intravenous, Continuous, Valencia, Dimpi, DO, Last Rate: 100 mL/hr at 09/14/22 0958, 100 mL/hr at 09/14/22 0958     Allergies   Allergen Reactions   • Shellfish Allergy Anaphylaxis   • Shellfish-Derived Products Anaphylaxis     Throat swelling   • Penicillins Unknown - High Severity     As baby        Social history is positive for smoking, alcohol abuse, drugs  Family History   Problem Relation Age of Onset   • Heart disease Mother    • Diabetes Mother    • Arthritis Mother    • Osteoporosis Mother    • Heart disease Father    • Diabetes Father    • Arthritis Father    • Stroke Sister    • Heart disease Other    • Diabetes Other         Social History     Social History Narrative   • Not on file       Immunization:  Immunization History   Administered Date(s) Administered   • Hepatitis A 04/27/2018   • Influenza, Unspecified 02/21/2020        Objective     Review of Systems 10 system review is negative except was mentioned HPI    Vital Signs:   BP 99/62 (BP Location: Right arm, Patient Position: Lying)   Pulse 60   Temp 98 °F (36.7 °C) (Oral)   Resp 16   Ht 172.7 cm (68\")   Wt 86.2 kg (190 lb 0.6 oz)   SpO2 100%   BMI 28.89 kg/m²       Physical Exam  Constitutional:       General: She is awake. She is not in acute distress.     Appearance: Normal appearance. She is well-developed and well-groomed.   HENT:      " Head: Normocephalic and atraumatic.      Mouth/Throat:      Mouth: Mucous membranes are moist.      Comments: Dorothy dental hygiene is good  Eyes:      General: Lids are normal.      Conjunctiva/sclera: Conjunctivae normal.      Pupils: Pupils are equal, round, and reactive to light.   Neck:      Thyroid: No thyroid mass.      Trachea: Trachea normal.   Cardiovascular:      Rate and Rhythm: Normal rate and regular rhythm.      Heart sounds: Normal heart sounds.   Pulmonary:      Effort: Pulmonary effort is normal.      Breath sounds: Normal breath sounds and air entry.   Abdominal:      General: Abdomen is flat. Bowel sounds are normal. There is no distension.      Palpations: Abdomen is soft. There is no mass.      Tenderness: There is no abdominal tenderness. There is no guarding.   Musculoskeletal:      Cervical back: Neck supple.      Right lower leg: No edema.      Left lower leg: No edema.   Skin:     General: Skin is warm and moist.      Coloration: Skin is not cyanotic, jaundiced or pale.      Findings: No rash.      Nails: There is no clubbing.   Neurological:      Mental Status: She is alert and oriented to person, place, and time.   Psychiatric:         Attention and Perception: Attention normal.         Mood and Affect: Mood and affect normal.         Speech: Speech normal.         Labs:  Results from last 7 days   Lab Units 09/14/22  0508 09/13/22  1730   WBC 10*3/mm3 7.30 9.97   HEMOGLOBIN g/dL 8.3* 10.5*   HEMATOCRIT % 25.9* 32.2*   PLATELETS 10*3/mm3 233 288      Results from last 7 days   Lab Units 09/14/22  0508 09/13/22  1730   SODIUM mmol/L 138 139   POTASSIUM mmol/L 4.0 3.8   CHLORIDE mmol/L 107 105   CO2 mmol/L 26.2 25.0   BUN mg/dL 15 17   CREATININE mg/dL 0.57 0.69   CALCIUM mg/dL 8.2* 8.6   BILIRUBIN mg/dL  --  0.2   ALK PHOS U/L  --  101   ALT (SGPT) U/L  --  20   AST (SGOT) U/L  --  24   GLUCOSE mg/dL 101* 103*             Assessment & Plan:  Principal Problem:    Gastrointestinal  hemorrhage  Active Problems:    GI bleed     Plan to do upper endoscopy risk and benefits discussed  Patient was given instructions and counseling regarding her condition or for health maintenance advice. Please see specific information pulled into the AVS if appropriate.        Signed:  Edilberto Cabrera MD  09/14/22  14:05 EDT

## 2022-09-14 NOTE — PLAN OF CARE
Goal Outcome Evaluation:  Plan of Care Reviewed With: patient           Outcome Evaluation: Patient presents with known deficits with functional mobility.  She is able to complete all transfers independently and ambulate ad jeet. in her room.  She does not need inpatient PT services.

## 2022-09-15 ENCOUNTER — READMISSION MANAGEMENT (OUTPATIENT)
Dept: CALL CENTER | Facility: HOSPITAL | Age: 62
End: 2022-09-15

## 2022-09-15 VITALS
TEMPERATURE: 97.8 F | WEIGHT: 190.04 LBS | BODY MASS INDEX: 28.8 KG/M2 | SYSTOLIC BLOOD PRESSURE: 105 MMHG | HEIGHT: 68 IN | RESPIRATION RATE: 17 BRPM | DIASTOLIC BLOOD PRESSURE: 54 MMHG | OXYGEN SATURATION: 96 % | HEART RATE: 68 BPM

## 2022-09-15 LAB
BASOPHILS # BLD AUTO: 0.02 10*3/MM3 (ref 0–0.2)
BASOPHILS NFR BLD AUTO: 0.3 % (ref 0–1.5)
DEPRECATED RDW RBC AUTO: 47.8 FL (ref 37–54)
EOSINOPHIL # BLD AUTO: 0.33 10*3/MM3 (ref 0–0.4)
EOSINOPHIL NFR BLD AUTO: 5.1 % (ref 0.3–6.2)
ERYTHROCYTE [DISTWIDTH] IN BLOOD BY AUTOMATED COUNT: 14.1 % (ref 12.3–15.4)
HCT VFR BLD AUTO: 26.4 % (ref 34–46.6)
HGB BLD-MCNC: 8.7 G/DL (ref 12–15.9)
HOLD SPECIMEN: 11
IMM GRANULOCYTES # BLD AUTO: 0.03 10*3/MM3 (ref 0–0.05)
IMM GRANULOCYTES NFR BLD AUTO: 0.5 % (ref 0–0.5)
LYMPHOCYTES # BLD AUTO: 2.15 10*3/MM3 (ref 0.7–3.1)
LYMPHOCYTES NFR BLD AUTO: 33.2 % (ref 19.6–45.3)
MCH RBC QN AUTO: 31.1 PG (ref 26.6–33)
MCHC RBC AUTO-ENTMCNC: 33 G/DL (ref 31.5–35.7)
MCV RBC AUTO: 94.3 FL (ref 79–97)
MONOCYTES # BLD AUTO: 0.47 10*3/MM3 (ref 0.1–0.9)
MONOCYTES NFR BLD AUTO: 7.3 % (ref 5–12)
NEUTROPHILS NFR BLD AUTO: 3.47 10*3/MM3 (ref 1.7–7)
NEUTROPHILS NFR BLD AUTO: 53.6 % (ref 42.7–76)
NRBC BLD AUTO-RTO: 0 /100 WBC (ref 0–0.2)
PLATELET # BLD AUTO: 244 10*3/MM3 (ref 140–450)
PMV BLD AUTO: 10.5 FL (ref 6–12)
RBC # BLD AUTO: 2.8 10*6/MM3 (ref 3.77–5.28)
WBC NRBC COR # BLD: 6.47 10*3/MM3 (ref 3.4–10.8)

## 2022-09-15 PROCEDURE — 99217 PR OBSERVATION CARE DISCHARGE MANAGEMENT: CPT | Performed by: INTERNAL MEDICINE

## 2022-09-15 PROCEDURE — 85025 COMPLETE CBC W/AUTO DIFF WBC: CPT | Performed by: INTERNAL MEDICINE

## 2022-09-15 PROCEDURE — G0378 HOSPITAL OBSERVATION PER HR: HCPCS

## 2022-09-15 RX ORDER — PANTOPRAZOLE SODIUM 40 MG/1
40 TABLET, DELAYED RELEASE ORAL 2 TIMES DAILY
Qty: 60 TABLET | Refills: 0 | Status: SHIPPED | OUTPATIENT
Start: 2022-09-15 | End: 2022-09-19

## 2022-09-15 RX ORDER — FERROUS SULFATE 325(65) MG
325 TABLET ORAL
Qty: 30 TABLET | Refills: 0 | Status: SHIPPED | OUTPATIENT
Start: 2022-09-15 | End: 2022-10-15

## 2022-09-15 RX ADMIN — TICAGRELOR 90 MG: 90 TABLET ORAL at 08:27

## 2022-09-15 RX ADMIN — BUPROPION HYDROCHLORIDE 300 MG: 150 TABLET, EXTENDED RELEASE ORAL at 08:28

## 2022-09-15 RX ADMIN — ISOSORBIDE MONONITRATE 30 MG: 30 TABLET, EXTENDED RELEASE ORAL at 08:39

## 2022-09-15 RX ADMIN — Medication 10 ML: at 08:30

## 2022-09-15 RX ADMIN — PANTOPRAZOLE SODIUM 40 MG: 40 TABLET, DELAYED RELEASE ORAL at 08:27

## 2022-09-15 RX ADMIN — CETIRIZINE HYDROCHLORIDE 10 MG: 10 TABLET, FILM COATED ORAL at 08:28

## 2022-09-15 RX ADMIN — ASPIRIN 81 MG CHEWABLE TABLET 81 MG: 81 TABLET CHEWABLE at 08:28

## 2022-09-15 NOTE — NURSING NOTE
Went over discharge instructions with pt, pharmacy brought up medication, all personal belongings returned, all questions answered, removed IV, catheter intact, transport requested

## 2022-09-15 NOTE — CONSULTS
Discharge Planning Assessment   Magdiel     Patient Name: Viviana Hodges  MRN: [id number]  Today's Date: 9/15/2022    Admit Date: 9/13/2022     Discharge Needs Assessment     Row Name 09/15/22 0912       Living Environment    People in Home spouse    Current Living Arrangements home    Primary Care Provided by self    Provides Primary Care For no one    Family Caregiver if Needed spouse    Quality of Family Relationships helpful;involved;supportive    Able to Return to Prior Arrangements yes       Resource/Environmental Concerns    Resource/Environmental Concerns none    Transportation Concerns none  PATIENT AND  DRIVE       Transition Planning    Patient/Family Anticipates Transition to home with family    Patient/Family Anticipated Services at Transition none    Transportation Anticipated family or friend will provide       Discharge Needs Assessment    Readmission Within the Last 30 Days no previous admission in last 30 days    Equipment Currently Used at Home bp cuff    Concerns to be Addressed denies needs/concerns at this time;no discharge needs identified    Anticipated Changes Related to Illness none    Equipment Needed After Discharge none               Discharge Plan     Row Name 09/15/22 0916       Plan    Plan CM assessment completed in room with patient.  CM role explained and discharge planning discussed. Demographics, PCP and Pharmacy verified and updated as appropriate. Patient is current with listed PCP.  Patient independent, lives with  and drives. Patient in  hospital 6 months ago for same issue. Patient stopped Protonix after 30 days and did not follow up with GI specialist post discharge. Plan to discharge home, no needs.              Continued Care and Services - Admitted Since 9/13/2022    Coordination has not been started for this encounter.       Expected Discharge Date and Time     Expected Discharge Date Expected Discharge Time    Sep 16, 2022          Demographic  Summary     Row Name 09/15/22 0911       General Information    Admission Type observation    Arrived From emergency department    Referral Source admission list    Reason for Consult discharge planning    Preferred Language English               Functional Status     Row Name 09/15/22 0911       Functional Status    Usual Activity Tolerance excellent    Current Activity Tolerance good       Functional Status, IADL    Medications independent    Meal Preparation independent    Housekeeping independent    Laundry independent    Shopping independent       Mental Status    General Appearance WDL WDL       Mental Status Summary    Recent Changes in Mental Status/Cognitive Functioning no changes       Employment/    Employment Status retired               Psychosocial    No documentation.                Abuse/Neglect    No documentation.                Legal    No documentation.                Substance Abuse    No documentation.                Patient Forms    No documentation.                   Ellie Deal

## 2022-09-15 NOTE — DISCHARGE SUMMARY
[facility]         HOSPITALIST  DISCHARGE SUMMARY    Patient Name: Viviana Hodges  : 1960  MRN: [id number]    Date of Admission: 2022  Date of Discharge:  09/15/22  Primary Care Physician: Delfin Bhandari DO    Consultants:  JEB Cabrera    Discharge Diagnosis:  · Melena, GI bleed  · Acute blood loss anemia  · Hyperlipidemia  · Depression anxiety  · Allergic rhinitis  · CAD with stenting  · Hypertension      Hospital Course     Hospital Course:  62-year-old female presents with melena.  Extensive CAD history with stenting several years ago, currently on aspirin and Brilinta.  Obesity status post gastric bypass.  Had a bleeding episode in March of this year with negative EGD, patient was sent to follow-up with her bariatric surgeon and repeat EGD was also negative.  On this particular presentation aspirin and Brilinta were held initially, EGD again unremarkable.  Dr. Cabrera, gastroenterology, discussed with [facility] about advanced endoscopy techniques available there for gastric bypass patients.  Patient is hemodynamically stable, hemoglobin stable for the last 2 checks and is appropriate for discharge home today.  She will follow-up next Wednesday with GI at [facility].  I have ordered a CBC check 4 days from now but have encouraged the patient if she continues to have melena or develops dizziness or shortness of breath to seek medical care sooner.  I discharge I am recommending continuing aspirin and Brilinta however she does have a call out to her cardiologist to verify if she is able to discontinue 1 of these agents to decrease her risk of bleeding.    DISCHARGE Follow Up Recommendations:   PPI twice daily until reevaluated  Aspirin and Brilinta resumed, defer discontinuing to cardiology, pending  CBC on Monday  Follow-up with [facility] GI on Wednesday    Day of Discharge     Vital Signs:  Temp:  [97 °F (36.1 °C)-98 °F (36.7 °C)]  97.8 °F (36.6 °C)  Heart Rate:  [65-89] 68  Resp:  [13-19] 17  BP: ()/(54-73) 105/54  Physical Exam:   Gen: NAD, WDWN  Resp: no dyspnea  CV: no LE edema  GI: Abdomen soft +bs  Psych: AOx3, normal mood and affect    Discharge Details        Discharge Medications      New Medications      Instructions Start Date   FeroSul 325 (65 FE) MG tablet  Generic drug: ferrous sulfate   325 mg, Oral, Daily With Breakfast      pantoprazole 40 MG EC tablet  Commonly known as: PROTONIX   40 mg, Oral, 2 Times Daily         Continue These Medications      Instructions Start Date   aspirin 81 MG chewable tablet   81 mg, Oral, Daily      buPROPion  MG 24 hr tablet  Commonly known as: WELLBUTRIN XL   TAKE 1 TABLET BY MOUTH  DAILY      cetirizine 10 MG tablet  Commonly known as: zyrTEC   TAKE 1 TABLET DAILY      cyclobenzaprine 10 MG tablet  Commonly known as: FLEXERIL   10 mg, Oral, 3 Times Daily PRN      estradiol 0.1 MG/24HR patch  Commonly known as: VIVELLE-DOT   1 patch, Transdermal, 2 Times Weekly      ezetimibe-simvastatin 10-40 MG per tablet  Commonly known as: VYTORIN   1 tablet, Oral, Nightly      fluticasone 50 MCG/ACT nasal spray  Commonly known as: FLONASE   2 sprays, Nasal, Daily      irbesartan-hydrochlorothiazide 300-12.5 MG tablet  Commonly known as: AVALIDE   TAKE 1 TABLET DAILY (NEED APPOINTMENT)      isosorbide mononitrate 30 MG 24 hr tablet  Commonly known as: IMDUR   30 mg, Oral, Daily      nebivolol 5 MG tablet  Commonly known as: BYSTOLIC   5 mg, Oral, Daily      nitroglycerin 0.4 MG SL tablet  Commonly known as: NITROSTAT   0.4 mg, Sublingual, Every 5 Minutes PRN, Take no more than 3 doses in 15 minutes.       sucralfate 1 GM/10ML suspension  Commonly known as: CARAFATE   1 g, Oral, 4 Times Daily With Meals & Nightly      ticagrelor 90 MG tablet tablet  Commonly known as: BRILINTA   90 mg, Oral, 2 Times Daily         Stop These Medications    omeprazole 40 MG capsule  Commonly known as: priLOSEC             Discharge Disposition:   Home or Self Care    Discharge Condition: Stable    Diet:  Hospital:  Diet Order   Procedures   • Diet Clear Liquid       Discharge Activity: As tolerated      Future Appointments   Date Time Provider Department Center   9/19/2022  9:30 AM Delfin Bhandari DO [facility]       Additional Instructions for the Follow-ups that You Need to Schedule     Discharge Follow-up with PCP   As directed       Currently Documented PCP:    Delfin Bhandari DO    PCP Phone Number:    [phone]     Follow Up Details: 1 week         CBC (No Diff)    Sep 19, 2022 (Approximate)      Release to patient: Routine Release               Pertinent  and/or Most Recent Results     PROCEDURES:   EGD    LAB and IMAGING RESULTS:      Lab 09/15/22  0453 09/14/22  0508 09/13/22  1730   WBC 6.47 7.30 9.97   HEMOGLOBIN 8.7* 8.3* 10.5*   HEMATOCRIT 26.4* 25.9* 32.2*   PLATELETS 244 233 288   NEUTROS ABS 3.47 3.40 5.73   IMMATURE GRANS (ABS) 0.03 0.04 0.04   LYMPHS ABS 2.15 3.00 3.34*   MONOS ABS 0.47 0.56 0.52   EOS ABS 0.33 0.28 0.30   MCV 94.3 95.2 94.7   LACTATE  --   --  0.7         Lab 09/14/22  0508 09/13/22  1730   SODIUM 138 139   POTASSIUM 4.0 3.8   CHLORIDE 107 105   CO2 26.2 25.0   ANION GAP 4.8* 9.0   BUN 15 17   CREATININE 0.57 0.69   EGFR 104.2 99.5   GLUCOSE 101* 103*   CALCIUM 8.2* 8.6   MAGNESIUM 1.8  --    PHOSPHORUS 3.4  --          Lab 09/13/22  1730   TOTAL PROTEIN 6.5   ALBUMIN 4.00   GLOBULIN 2.5   ALT (SGPT) 20   AST (SGOT) 24   BILIRUBIN 0.2   ALK PHOS 101                 Lab 09/13/22 2126   ABO TYPING O   RH TYPING Positive   ANTIBODY SCREEN Negative         Brief Urine Lab Results  (Last result in the past 365 days)      Color   Clarity   Blood   Leuk Est   Nitrite   Protein   CREAT   Urine HCG        03/01/22 1314               Negative           Microbiology Results (last 10 days)     ** No results found for the last 240 hours. **               Time spent on Discharge including face to  face service: Greater than 35 minutes    Electronically signed by Mandy De La Rosa MD, 09/15/22, 11:35 AM EDT.

## 2022-09-15 NOTE — OUTREACH NOTE
Prep Survey    Flowsheet Row Responses   [facility] patient discharged from? [facility]   Is LACE score < 7 ? Yes   Emergency Room discharge w/ pulse ox? No   Eligibility [facility]   Date of Admission 09/13/22   Date of Discharge 09/15/22   Discharge Disposition Home or Self Care   Discharge diagnosis GI Bleed, EGD   Does the patient have one of the following disease processes/diagnoses(primary or secondary)? Other   Does the patient have Home health ordered? No   Is there a DME ordered? No   Prep survey completed? Yes          LOREN BOLAND - Registered Nurse

## 2022-09-16 ENCOUNTER — TRANSITIONAL CARE MANAGEMENT TELEPHONE ENCOUNTER (OUTPATIENT)
Dept: CALL CENTER | Facility: HOSPITAL | Age: 62
End: 2022-09-16

## 2022-09-16 NOTE — OUTREACH NOTE
Call Center TCM Note    Flowsheet Row Responses   [facility] patient discharged from? [facility]   Does the patient have one of the following disease processes/diagnoses(primary or secondary)? Other   TCM attempt successful? Yes   Call start time 1107   Call end time 1108   Discharge diagnosis GI Bleed, EGD   Meds reviewed with patient/caregiver? Yes   Is the patient having any side effects they believe may be caused by any medication additions or changes? No   Does the patient have all medications ordered at discharge? Yes   Is the patient taking all medications as directed (includes completed medication regime)? Yes   Comments HOSP DC FU appt 9/19/22 @ 9:30 am   Has home health visited the patient within 72 hours of discharge? N/A   Psychosocial issues? No   Did the patient receive a copy of their discharge instructions? Yes   Nursing interventions Reviewed instructions with patient   What is the patient's perception of their health status since discharge? Improving   Is the patient/caregiver able to teach back signs and symptoms related to disease process for when to call PCP? Yes   Is the patient/caregiver able to teach back signs and symptoms related to disease process for when to call 911? Yes   Is the patient/caregiver able to teach back the hierarchy of who to call/visit for symptoms/problems? PCP, Specialist, Home health nurse, Urgent Care, ED, 911 Yes   TCM call completed? Yes   Wrap up additional comments Pt doing well. No s/s of bleeding.           Harper Omer RN    9/16/2022, 11:09 EDT

## 2022-09-18 ENCOUNTER — TELEPHONE (OUTPATIENT)
Dept: GASTROENTEROLOGY | Facility: CLINIC | Age: 62
End: 2022-09-18

## 2022-09-19 ENCOUNTER — LAB (OUTPATIENT)
Dept: LAB | Facility: HOSPITAL | Age: 62
End: 2022-09-19

## 2022-09-19 ENCOUNTER — OFFICE VISIT (OUTPATIENT)
Dept: FAMILY MEDICINE CLINIC | Facility: CLINIC | Age: 62
End: 2022-09-19

## 2022-09-19 VITALS
TEMPERATURE: 98.6 F | SYSTOLIC BLOOD PRESSURE: 115 MMHG | WEIGHT: 197 LBS | HEIGHT: 68 IN | DIASTOLIC BLOOD PRESSURE: 63 MMHG | BODY MASS INDEX: 29.86 KG/M2 | HEART RATE: 65 BPM | RESPIRATION RATE: 18 BRPM | OXYGEN SATURATION: 96 %

## 2022-09-19 DIAGNOSIS — D50.0 IRON DEFICIENCY ANEMIA DUE TO CHRONIC BLOOD LOSS: ICD-10-CM

## 2022-09-19 DIAGNOSIS — K25.9 STRESS ULCER OF STOMACH: ICD-10-CM

## 2022-09-19 DIAGNOSIS — K21.9 CHRONIC GERD: ICD-10-CM

## 2022-09-19 DIAGNOSIS — K29.71 GASTROINTESTINAL HEMORRHAGE ASSOCIATED WITH GASTRITIS, UNSPECIFIED GASTRITIS TYPE: ICD-10-CM

## 2022-09-19 DIAGNOSIS — I10 PRIMARY HYPERTENSION: ICD-10-CM

## 2022-09-19 DIAGNOSIS — Z11.59 NEED FOR HEPATITIS C SCREENING TEST: ICD-10-CM

## 2022-09-19 DIAGNOSIS — D64.9 CHRONIC ANEMIA: ICD-10-CM

## 2022-09-19 DIAGNOSIS — Z11.59 NEED FOR HEPATITIS C SCREENING TEST: Primary | ICD-10-CM

## 2022-09-19 DIAGNOSIS — E78.2 MIXED HYPERLIPIDEMIA: ICD-10-CM

## 2022-09-19 DIAGNOSIS — K92.2 GASTROINTESTINAL HEMORRHAGE, UNSPECIFIED GASTROINTESTINAL HEMORRHAGE TYPE: ICD-10-CM

## 2022-09-19 DIAGNOSIS — Z98.61 CORONARY ANGIOPLASTY STATUS: ICD-10-CM

## 2022-09-19 PROCEDURE — 86803 HEPATITIS C AB TEST: CPT

## 2022-09-19 PROCEDURE — 82607 VITAMIN B-12: CPT

## 2022-09-19 PROCEDURE — 82728 ASSAY OF FERRITIN: CPT

## 2022-09-19 PROCEDURE — 99495 TRANSJ CARE MGMT MOD F2F 14D: CPT | Performed by: FAMILY MEDICINE

## 2022-09-19 PROCEDURE — 84466 ASSAY OF TRANSFERRIN: CPT

## 2022-09-19 PROCEDURE — 85027 COMPLETE CBC AUTOMATED: CPT

## 2022-09-19 PROCEDURE — 82746 ASSAY OF FOLIC ACID SERUM: CPT

## 2022-09-19 PROCEDURE — 83540 ASSAY OF IRON: CPT

## 2022-09-19 PROCEDURE — 36415 COLL VENOUS BLD VENIPUNCTURE: CPT

## 2022-09-19 RX ORDER — PANTOPRAZOLE SODIUM 40 MG/1
40 TABLET, DELAYED RELEASE ORAL DAILY
Qty: 90 TABLET | Refills: 99 | Status: SHIPPED | OUTPATIENT
Start: 2022-09-19 | End: 2022-09-23 | Stop reason: SDUPTHER

## 2022-09-19 NOTE — PROGRESS NOTES
Transitional Care Follow Up Visit  Subjective     Viviana Hodges is a 62 y.o. female who presents for a transitional care management visit.    Within 48 business hours after discharge our office contacted her via telephone to coordinate her care and needs.      I reviewed and discussed the details of that call along with the discharge summary, hospital problems, inpatient lab results, inpatient diagnostic studies, and consultation reports with Viviana.     Current outpatient and discharge medications have been reconciled for the patient.  Reviewed by: Delfin Bhandari DO      Date of TCM Phone Call 9/15/2022   [facility]   [facility] Ad[facility] 20[facility]   Date of Discharge 9/15/2022   Discharge Disposition Home or Self Care     Risk for Readmission (LACE) Score: 3 (9/15/2022  6:00 AM)      History of Present Illness     Patient presents for hospital follow-up was admitted from -9 15 for GI bleed recurrent anemia she is had extensive CAD history on medicines to help with anticoagulation and stenting such as aspirin and Brilinta which complicates her GI tract causing bleeds or ulcers over time, recently admitted for hemoglobin around 7 or less. Last CBC showed hemoglobin was trending up to 8.3 or more. Taking iron therapy to help, avoiding medicines as able that could cause more bleeding issues that she has been taking for awhile like Brillinta and just taking aspirin if anything and PPI to help protect stomach lining and any ulcers or similar bleeding.    Still feels very fatigued, wants blood checked today and close follow up on not having recurring anemia. Out of work until able to stablize blood count      Course During Hospital Stay:      [facility]     Patient Name: Viviana Hodges  : 1960  MRN: [id number]     Date of Admission: 2022  Date of Discharge:   09/15/22  Primary Care Physician: Delfin Bhandari DO     Consultants:  GI Dr. Cabrera     Discharge Diagnosis:  · Melena, GI bleed  · Acute blood loss anemia  · Hyperlipidemia  · Depression anxiety  · Allergic rhinitis  · CAD with stenting  · Hypertension        Hospital Course      Hospital Course:  62-year-old female presents with melena.  Extensive CAD history with stenting several years ago, currently on aspirin and Brilinta.  Obesity status post gastric bypass.  Had a bleeding episode in March of this year with negative EGD, patient was sent to follow-up with her bariatric surgeon and repeat EGD was also negative.  On this particular presentation aspirin and Brilinta were held initially, EGD again unremarkable.  Dr. Cabrera, gastroenterology, discussed with [facility] about advanced endoscopy techniques available there for gastric bypass patients.  Patient is hemodynamically stable, hemoglobin stable for the last 2 checks and is appropriate for discharge home today.  She will follow-up next Wednesday with GI at [facility].  I have ordered a CBC check 4 days from now but have encouraged the patient if she continues to have melena or develops dizziness or shortness of breath to seek medical care sooner.  I discharge I am recommending continuing aspirin and Brilinta however she does have a call out to her cardiologist to verify if she is able to discontinue 1 of these agents to decrease her risk of bleeding.     DISCHARGE Follow Up Recommendations:   PPI twice daily until reevaluated  Aspirin and Brilinta resumed, defer discontinuing to cardiology, pending  CBC on Monday  Follow-up with [facility] on Wednesday       The following portions of the patient's history were reviewed and updated as appropriate: allergies, current medications, past family history, past medical history, past social history, past surgical history and problem list.    /63   Pulse 65   Temp  "98.6 °F (37 °C)   Resp 18   Ht 172.7 cm (68\")   Wt 89.4 kg (197 lb)   SpO2 96%   Breastfeeding No   BMI 29.95 kg/m²      Blood Pressures during visit    09/19/22 0959   BP: 115/63       Objective   Physical Exam  Vitals reviewed.   Constitutional:       Appearance: Normal appearance. She is well-developed.   HENT:      Head: Normocephalic and atraumatic.      Right Ear: External ear normal.      Left Ear: External ear normal.      Nose: Nose normal.   Eyes:      Conjunctiva/sclera: Conjunctivae normal.      Pupils: Pupils are equal, round, and reactive to light.   Cardiovascular:      Rate and Rhythm: Normal rate.   Pulmonary:      Effort: Pulmonary effort is normal.      Breath sounds: Normal breath sounds.   Abdominal:      General: There is no distension.   Skin:     General: Skin is warm and dry.   Neurological:      General: No focal deficit present.      Mental Status: She is alert and oriented to person, place, and time.   Psychiatric:         Mood and Affect: Mood and affect normal.         Behavior: Behavior normal.         Thought Content: Thought content normal.         Judgment: Judgment normal.       Current outpatient and discharge medications have been reconciled for the patient.  Reviewed by: Delfin Bhandari DO      Assessment & Plan   Problems Addressed this Visit        Active Problems    HTN (hypertension) (Chronic)    Chronic anemia    Relevant Orders    Ferritin (Completed)    Iron Profile (Completed)    Vitamin B12 (Completed)    Folate (Completed)    Chronic GERD    Coronary angioplasty status    HLD (hyperlipidemia)    Iron deficiency anemia due to chronic blood loss    Stress ulcer of stomach    Gastrointestinal hemorrhage      Other Visit Diagnoses     Need for hepatitis C screening test    -  Primary    Relevant Orders    Hepatitis C Antibody (Completed)      Diagnoses       Codes Comments    Need for hepatitis C screening test    -  Primary ICD-10-CM: Z11.59  ICD-9-CM: V73.89     " Chronic anemia     ICD-10-CM: D64.9  ICD-9-CM: 285.9     Chronic GERD     ICD-10-CM: K21.9  ICD-9-CM: 530.81     Stress ulcer of stomach     ICD-10-CM: K25.9  ICD-9-CM: 531.90     Gastrointestinal hemorrhage, unspecified gastrointestinal hemorrhage type     ICD-10-CM: K92.2  ICD-9-CM: 578.9     Iron deficiency anemia due to chronic blood loss     ICD-10-CM: D50.0  ICD-9-CM: 280.0     Primary hypertension     ICD-10-CM: I10  ICD-9-CM: 401.9     Mixed hyperlipidemia     ICD-10-CM: E78.2  ICD-9-CM: 272.2     Coronary angioplasty status     ICD-10-CM: Z98.61  ICD-9-CM: V45.82          BP at goal <140/90, continue meds    Monitor CBC routinely for next month or so, recheck iron ferritin and other levels like b12, do have POCT at clinic to have checked easily or home health to monitor labs at home until more stablized    Review with specialist if ok to only take aspirin or alternative for stent and coronary arteries    Work on managing BENNIE symptoms, diet and stress reductions    Recheck labs in a month or sooner if symptoms such as pallor, worsening fatigue or other come on suddenly         Follow Up   Return in about 3 weeks (around 10/10/2022), or if symptoms worsen or fail to improve, for Labs before, Recheck.

## 2022-09-20 ENCOUNTER — TELEPHONE (OUTPATIENT)
Dept: FAMILY MEDICINE CLINIC | Facility: CLINIC | Age: 62
End: 2022-09-20

## 2022-09-20 LAB
DEPRECATED RDW RBC AUTO: 50.2 FL (ref 37–54)
ERYTHROCYTE [DISTWIDTH] IN BLOOD BY AUTOMATED COUNT: 14.2 % (ref 12.3–15.4)
FERRITIN SERPL-MCNC: 23.6 NG/ML (ref 13–150)
FOLATE SERPL-MCNC: >20 NG/ML (ref 4.78–24.2)
HCT VFR BLD AUTO: 28.4 % (ref 34–46.6)
HCV AB SER DONR QL: NORMAL
HGB BLD-MCNC: 9 G/DL (ref 12–15.9)
IRON 24H UR-MRATE: 247 MCG/DL (ref 37–145)
IRON SATN MFR SERPL: 50 % (ref 20–50)
MCH RBC QN AUTO: 30.8 PG (ref 26.6–33)
MCHC RBC AUTO-ENTMCNC: 31.7 G/DL (ref 31.5–35.7)
MCV RBC AUTO: 97.3 FL (ref 79–97)
PLATELET # BLD AUTO: 335 10*3/MM3 (ref 140–450)
PMV BLD AUTO: 10.6 FL (ref 6–12)
RBC # BLD AUTO: 2.92 10*6/MM3 (ref 3.77–5.28)
TIBC SERPL-MCNC: 495 MCG/DL (ref 298–536)
TRANSFERRIN SERPL-MCNC: 332 MG/DL (ref 200–360)
VIT B12 BLD-MCNC: >2000 PG/ML (ref 211–946)
WBC NRBC COR # BLD: 6.98 10*3/MM3 (ref 3.4–10.8)

## 2022-09-20 NOTE — TELEPHONE ENCOUNTER
Caller: Viviana Hodges    Relationship: Self    Best call back number: [phone]    Caller requesting test results: PATIENT     What test was performed: BLOOD WORK    When was the test performed: 09/19/2022    Where was the test performed: IN OFFICE     Additional notes: PATIENT IS ESPECIALLY CONCERNED WITH THE HEMOGLOBIN COUNT. RESULTS ARE NOT YET AVAILABLE ON MY CHART PLEASE CONTACT AS SOON AS POSSIBLE

## 2022-09-23 DIAGNOSIS — K21.9 CHRONIC GERD: ICD-10-CM

## 2022-09-23 RX ORDER — PANTOPRAZOLE SODIUM 40 MG/1
40 TABLET, DELAYED RELEASE ORAL DAILY
Qty: 90 TABLET | Refills: 99 | Status: SHIPPED | OUTPATIENT
Start: 2022-09-23 | End: 2022-10-17 | Stop reason: SDUPTHER

## 2022-09-23 NOTE — TELEPHONE ENCOUNTER
Caller: Viviana Hodges    Relationship: Self    Best call back number: [phone]    Requested Prescriptions:   Requested Prescriptions     Pending Prescriptions Disp Refills   • pantoprazole (Protonix) 40 MG EC tablet 90 tablet 99     Sig: Take 1 tablet by mouth Daily.        Pharmacy where request should be sent: [facility] - [facility] [address] [facility] [phone] [facility] [phone] FX     Additional details provided by patient: PRESCRIPTION NEEDS TO BE RESENT FOR TWO TABLETS PER DAY, BOTH SPECIALISTS THAT SHE HAS SEEN HAVE ADVISE THAT SHE NEEDS THIS DOSAGE AMOUNT.  A PRIOR AUTHORIZATION IS NEEDED BY INSURANCE BECAUSE THEY WILL ONLY COVER ONE PER DAY.  PLEASE CONTACT PATIENT IF THERE ARE ANY QUESTIONS AND LET HER KNOW WHEN AUTHORIZATION REQUEST HAS BEEN SENT      Does the patient have less than a 3 day supply:  [] Yes  [x] No    Nikhil Mayers Rep   09/23/22 09:15 EDT

## 2022-09-25 PROBLEM — D64.9 CHRONIC ANEMIA: Status: ACTIVE | Noted: 2022-09-25

## 2022-10-17 DIAGNOSIS — K21.9 CHRONIC GERD: ICD-10-CM

## 2022-10-17 RX ORDER — PANTOPRAZOLE SODIUM 40 MG/1
40 TABLET, DELAYED RELEASE ORAL 2 TIMES DAILY
Qty: 180 TABLET | Refills: 4 | Status: SHIPPED | OUTPATIENT
Start: 2022-10-17

## 2022-10-17 NOTE — TELEPHONE ENCOUNTER
Caller: Viviana Hodges    Relationship: Self    Best call back number: [phone]    Requested Prescriptions:   Requested Prescriptions     Pending Prescriptions Disp Refills   • pantoprazole (Protonix) 40 MG EC tablet 90 tablet 99     Sig: Take 1 tablet by mouth Daily.        Pharmacy where request should be sent: [email or web address] ([facility] - [facility] [address] [facility] [phone] [facility] [phone] FX     Additional details provided by patient: PRESCRIPTION WAS SENT FOR 1 TABLET DAILY AND IT NEEDS TO BE CHANGED TO TWO TABLETS A DAY AS RECOMMENDED BY THE GASTROLOGISTS SHE HAS SEEN.  AN AUTHORIZATION IS REQUIRED BY INSURANCE TO COVER TWO TABLETS A DAY SO THIS NEEDS TO BE SENT ALSO.  SHE RECEIVED A 90 DAY SUPPLY FOR ONE TABLET A DAY TODAY. PLEASE CONTACT IF THERE ARE ANY QUESTIONS     Does the patient have less than a 3 day supply:  [] Yes  [x] No    Nikhil Mayers Rep   10/17/22 12:32 EDT

## 2022-11-17 ENCOUNTER — TELEPHONE (OUTPATIENT)
Dept: FAMILY MEDICINE CLINIC | Facility: CLINIC | Age: 62
End: 2022-11-17

## 2022-11-17 DIAGNOSIS — D50.0 IRON DEFICIENCY ANEMIA DUE TO CHRONIC BLOOD LOSS: ICD-10-CM

## 2022-11-17 DIAGNOSIS — E83.51 HYPOCALCEMIA: ICD-10-CM

## 2022-11-17 DIAGNOSIS — K25.9 STRESS ULCER OF STOMACH: ICD-10-CM

## 2022-11-17 DIAGNOSIS — D64.9 CHRONIC ANEMIA: Primary | ICD-10-CM

## 2022-11-17 NOTE — TELEPHONE ENCOUNTER
Caller: Viviana Hodges    Relationship: Self    Best call back number: [phone]     What orders are you requesting (i.e. lab or imaging): BLOOD WORK     In what timeframe would the patient need to come in: AS SOON AS POSSIBLE     Where will you receive your lab/imaging services: [facility]     Additional notes: PLEASE CALL AND ADVISE.

## 2022-12-06 ENCOUNTER — TELEPHONE (OUTPATIENT)
Dept: FAMILY MEDICINE CLINIC | Facility: CLINIC | Age: 62
End: 2022-12-06

## 2022-12-06 NOTE — TELEPHONE ENCOUNTER
Caller: Viviana Hodges    Relationship to patient: Self    Best call back number: [phone]    Chief complaint: BELIEVES SHE HAS A SPIDER BIT ON HER THUMB     Type of visit: SAME DAY     Requested date: TODAY IF POSSIBLE         Additional notes:PATIENT STATES SHE HAS HAD THIS FOR A FEW DAYS AND IT ISN'T GETTING ANY BETTER

## 2022-12-08 ENCOUNTER — OFFICE VISIT (OUTPATIENT)
Dept: FAMILY MEDICINE CLINIC | Facility: CLINIC | Age: 62
End: 2022-12-08

## 2022-12-08 VITALS
HEART RATE: 72 BPM | WEIGHT: 193 LBS | HEIGHT: 68 IN | DIASTOLIC BLOOD PRESSURE: 80 MMHG | TEMPERATURE: 97.5 F | BODY MASS INDEX: 29.25 KG/M2 | OXYGEN SATURATION: 98 % | SYSTOLIC BLOOD PRESSURE: 124 MMHG

## 2022-12-08 DIAGNOSIS — L08.9 INFECTED LESION OF SKIN: Primary | ICD-10-CM

## 2022-12-08 PROCEDURE — 99213 OFFICE O/P EST LOW 20 MIN: CPT

## 2022-12-08 RX ORDER — CLOPIDOGREL BISULFATE 75 MG/1
TABLET ORAL
COMMUNITY
Start: 2022-11-23

## 2022-12-08 RX ORDER — AMOXICILLIN AND CLAVULANATE POTASSIUM 875; 125 MG/1; MG/1
1 TABLET, FILM COATED ORAL 2 TIMES DAILY
Qty: 14 TABLET | Refills: 0 | Status: SHIPPED | OUTPATIENT
Start: 2022-12-08 | End: 2022-12-15

## 2022-12-08 NOTE — PROGRESS NOTES
Chief Complaint   Patient presents with   • Insect Bite     Possible spider bite on right thumb, has had place on thumb for almost two months now. Place is sore and warm to the touch and hurts when she hits it on something.        Subjective          Viviana Hodges presents to [facility]    History of Present Illness  She is here to be seen for a possible insect bite on her right thumb. It has been there for 2 months now. She has opened up the center of it twice to see if it would drain anything and nothing comes out. The area is round and red on the edges with a scab in the middle. It is hard and warm to touch.       Past History:  Medical History: has a past medical history of Allergic, Angina at rest (HCC), Anxiety, Bleeding ulcer, Bursitis of left shoulder (2018), CAD (coronary artery disease) (2015), Chronic allergic rhinitis, DDD (degenerative disc disease), lumbar (2016), GERD (gastroesophageal reflux disease) (2016), History of coronary artery stent placement, HLD (hyperlipidemia), HTN (hypertension), IBS (irritable bowel syndrome) (2016), MDD (major depressive disorder), Nondisplaced fracture of proximal end of right fibula (2017), OA (osteoarthritis), Obesity (BMI 30-39.9), and Seasonal allergies.   Surgical History: has a past surgical history that includes Coronary angioplasty with stent;  section (); Colonoscopy; Eye surgery; Cardiac surgery; Hysterectomy (); Laparoscopic gastric banding (); Tonsillectomy; Esophagogastroduodenoscopy (N/A, 3/1/2022); Colonoscopy (N/A, 3/2/2022); and Esophagogastroduodenoscopy (N/A, 2022).   Family History: family history includes Arthritis in her father and mother; Diabetes in her father, mother, and another family member; Heart disease in her father, mother, and another family member; Osteoporosis in her mother; Stroke in her sister.   Social History: reports that she  "quit smoking about 15 years ago. Her smoking use included cigarettes. She started smoking about 40 years ago. She has a 12.50 pack-year smoking history. She has never used smokeless tobacco. She reports current alcohol use. She reports that she does not use drugs.  Allergies: Shellfish allergy, Shellfish-derived products, and Penicillins  (Not in a hospital admission)       Social History     Socioeconomic History   • Marital status:    Tobacco Use   • Smoking status: Former     Packs/day: 0.50     Years: 25.00     Pack years: 12.50     Types: Cigarettes     Start date: 1982     Quit date: 2007     Years since quitting: 15.9   • Smokeless tobacco: Never   • Tobacco comments:     QUIT 15 YEARS AGO   Vaping Use   • Vaping Use: Never used   Substance and Sexual Activity   • Alcohol use: Yes     Comment: Occ; has been drinking for 31 or more years   • Drug use: Never   • Sexual activity: Defer       There are no preventive care reminders to display for this patient.    Objective     Vital Signs:   /80 (BP Location: Left arm, Patient Position: Sitting)   Pulse 72   Temp 97.5 °F (36.4 °C) (Infrared)   Ht 172.7 cm (68\")   Wt 87.5 kg (193 lb)   SpO2 98%   BMI 29.35 kg/m²       Physical Exam  Constitutional:       Appearance: Normal appearance.   HENT:      Nose: Nose normal.      Mouth/Throat:      Mouth: Mucous membranes are moist.   Cardiovascular:      Rate and Rhythm: Normal rate.      Pulses: Normal pulses.   Pulmonary:      Effort: Pulmonary effort is normal.   Skin:     General: Skin is warm and dry.      Findings: Lesion present.   Neurological:      General: No focal deficit present.      Mental Status: She is alert and oriented to person, place, and time.   Psychiatric:         Mood and Affect: Mood normal.         Behavior: Behavior normal.          Review of Systems   Skin: Positive for wound.   All other systems reviewed and are negative.       Result Review :                 Assessment and " Plan    Diagnoses and all orders for this visit:    1. Infected lesion of skin (Primary)  Comments:  Take augmentin for 1 week. Follow up to recheck site. If not improved we may need to open it up or refer on.    Other orders  -     amoxicillin-clavulanate (Augmentin) 875-125 MG per tablet; Take 1 tablet by mouth 2 (Two) Times a Day for 7 days.  Dispense: 14 tablet; Refill: 0          Pt thought to be clinically stable at this time.    Follow Up   Return in about 1 week (around 12/15/2022), or if symptoms worsen or fail to improve.  Patient was given instructions and counseling regarding her condition or for health maintenance advice. Please see specific information pulled into the AVS if appropriate.

## 2022-12-09 ENCOUNTER — TELEPHONE (OUTPATIENT)
Dept: FAMILY MEDICINE CLINIC | Facility: CLINIC | Age: 62
End: 2022-12-09

## 2022-12-09 NOTE — TELEPHONE ENCOUNTER
Caller: Viviana Hodges    Relationship to patient: Self    Best call back number: [phone]    Chief complaint: CUT HER HAND    Patient directed to call 911 or go to their nearest emergency room.     Patient verbalized understanding: [x] Yes  [] No    Additional notes: PATIENT CALLED TO SEE IF THE OFFICE DOES STITCHES. SHE STATED THAT SHE CUT HER HAND. PATIENT WAS ADVISED TO GO TO THE ER. SHE UNDERSTOOD AND WILL GO TO THE ER.

## 2022-12-12 ENCOUNTER — TELEPHONE (OUTPATIENT)
Dept: FAMILY MEDICINE CLINIC | Facility: CLINIC | Age: 62
End: 2022-12-12

## 2022-12-12 NOTE — TELEPHONE ENCOUNTER
Patient asking for alternative medication.  Augmentin is causing a lot of diarrhea.  Patient taking this for a spider bite.  Last seen by Janice on 12/8/22

## 2022-12-12 NOTE — TELEPHONE ENCOUNTER
Caller: Viviana Hodges    Relationship: Self    Best call back number: [phone]    What medication are you requesting: ANYTHING OTHER THAN THE CURRENT MEDICATION PRESCRIBED FOR SPIDER BITE, PATIENT THINKS IT IS AMOXICILLIN    What are your current symptoms: DIARRHEA    If a prescription is needed, what is your preferred pharmacy and phone number: [place], [place] - [address] [place] [address] [phone] [facility] [phone] FX

## 2022-12-14 ENCOUNTER — OFFICE VISIT (OUTPATIENT)
Dept: FAMILY MEDICINE CLINIC | Facility: CLINIC | Age: 62
End: 2022-12-14

## 2022-12-14 VITALS
DIASTOLIC BLOOD PRESSURE: 75 MMHG | TEMPERATURE: 98.3 F | OXYGEN SATURATION: 98 % | BODY MASS INDEX: 29.27 KG/M2 | HEIGHT: 68 IN | WEIGHT: 193.1 LBS | HEART RATE: 64 BPM | SYSTOLIC BLOOD PRESSURE: 134 MMHG

## 2022-12-14 DIAGNOSIS — D64.9 CHRONIC ANEMIA: Primary | ICD-10-CM

## 2022-12-14 DIAGNOSIS — Z48.02 VISIT FOR SUTURE REMOVAL: ICD-10-CM

## 2022-12-14 DIAGNOSIS — D50.0 IRON DEFICIENCY ANEMIA DUE TO CHRONIC BLOOD LOSS: ICD-10-CM

## 2022-12-14 PROCEDURE — 99213 OFFICE O/P EST LOW 20 MIN: CPT | Performed by: FAMILY MEDICINE

## 2022-12-14 NOTE — PROGRESS NOTES
"Chief Complaint  Follow-up    Subjective        Viviana Hodges presents to [facility]  History of Present Illness    Patient presents to follow-up on stitches in her hand from puncture, was stitched due to thin blood continue bleeding which complicated healing went to the ER and tolerated procedure without complication is ready for them to be removed today no signs of infection or other.    Continues to have issues with anemia on blood thinners or antiplatelet medication, rechecking labs and follow-up with specialist    Objective   Vital Signs:  /75 (BP Location: Left arm, Patient Position: Sitting)   Pulse 64   Temp 98.3 °F (36.8 °C)   Ht 172.7 cm (67.99\")   Wt 87.6 kg (193 lb 1.6 oz)   SpO2 98%   BMI 29.37 kg/m²   Estimated body mass index is 29.37 kg/m² as calculated from the following:    Height as of this encounter: 172.7 cm (67.99\").    Weight as of this encounter: 87.6 kg (193 lb 1.6 oz).    BMI is >= 25 and <30. (Overweight) The following options were offered after discussion;: exercise counseling/recommendations      Physical Exam  Vitals reviewed.   Constitutional:       Appearance: Normal appearance. She is well-developed.   HENT:      Head: Normocephalic and atraumatic.      Right Ear: External ear normal.      Left Ear: External ear normal.      Nose: Nose normal.   Eyes:      Conjunctiva/sclera: Conjunctivae normal.      Pupils: Pupils are equal, round, and reactive to light.   Cardiovascular:      Rate and Rhythm: Normal rate.   Pulmonary:      Effort: Pulmonary effort is normal.      Breath sounds: Normal breath sounds.   Abdominal:      General: There is no distension.   Skin:     General: Skin is warm and dry.      Comments: Sutures removed without difficulty, monitor at home and keep area clean, steri strips provided to add additional support   Neurological:      General: No focal deficit present.      Mental Status: She is alert and oriented " to person, place, and time.   Psychiatric:         Mood and Affect: Mood and affect normal.         Behavior: Behavior normal.         Thought Content: Thought content normal.         Judgment: Judgment normal.        *other skin area resembles healing spider bite, monitor at home and follow up if worse or concerned, precautions given and red flag symptoms reviewed with patient on when to follow up sooner if indicated     Result Review :  The following data was reviewed by: Delfin Bhandari DO on 12/14/2022:  Common labs    Common Labs 9/14/22 9/14/22 9/15/22 9/19/22    0508 0508     Glucose  101 (A)     BUN  15     Creatinine  0.57     Sodium  138     Potassium  4.0     Chloride  107     Calcium  8.2 (A)     WBC 7.30  6.47 6.98   Hemoglobin 8.3 (A)  8.7 (A) 9.0 (A)   Hematocrit 25.9 (A)  26.4 (A) 28.4 (A)   Platelets 233  244 335   (A) Abnormal value            Data reviewed: Recent hospitalization notes ED visit           Assessment and Plan   Diagnoses and all orders for this visit:    1. Chronic anemia (Primary)    2. Iron deficiency anemia due to chronic blood loss    3. Visit for suture removal      Urgent Care Provider Note by Jolynn Dove APRN (12/09/2022 14:02)    As above was seen in urgent care 12/9 for laceration healing as appropriate, no concerns or signs of infection suture removal today    Continue medicines as prescribed otherwise for anemia and monitoring iron deficiency    *going on vacation next week, can video follow up if indicated       Follow Up   Return if symptoms worsen or fail to improve, for Next scheduled follow up, Recheck.  Patient was given instructions and counseling regarding her condition or for health maintenance advice. Please see specific information pulled into the AVS if appropriate.

## 2022-12-21 RX ORDER — CYCLOBENZAPRINE HCL 10 MG
TABLET ORAL
Qty: 270 TABLET | Refills: 3 | Status: SHIPPED | OUTPATIENT
Start: 2022-12-21

## 2022-12-21 RX ORDER — BUPROPION HYDROCHLORIDE 300 MG/1
TABLET ORAL
Qty: 90 TABLET | Refills: 3 | Status: SHIPPED | OUTPATIENT
Start: 2022-12-21

## 2023-02-01 ENCOUNTER — LAB (OUTPATIENT)
Dept: LAB | Facility: HOSPITAL | Age: 63
End: 2023-02-01
Payer: COMMERCIAL

## 2023-02-01 ENCOUNTER — OFFICE VISIT (OUTPATIENT)
Dept: FAMILY MEDICINE CLINIC | Facility: CLINIC | Age: 63
End: 2023-02-01
Payer: COMMERCIAL

## 2023-02-01 VITALS
DIASTOLIC BLOOD PRESSURE: 68 MMHG | WEIGHT: 196.3 LBS | BODY MASS INDEX: 29.75 KG/M2 | HEART RATE: 64 BPM | TEMPERATURE: 97.7 F | SYSTOLIC BLOOD PRESSURE: 115 MMHG | HEIGHT: 68 IN | OXYGEN SATURATION: 97 %

## 2023-02-01 DIAGNOSIS — L98.9 HAND LESION: ICD-10-CM

## 2023-02-01 DIAGNOSIS — D50.0 IRON DEFICIENCY ANEMIA DUE TO CHRONIC BLOOD LOSS: ICD-10-CM

## 2023-02-01 DIAGNOSIS — K25.9 STRESS ULCER OF STOMACH: ICD-10-CM

## 2023-02-01 DIAGNOSIS — E83.51 HYPOCALCEMIA: ICD-10-CM

## 2023-02-01 DIAGNOSIS — I96: Primary | ICD-10-CM

## 2023-02-01 DIAGNOSIS — S61.401D: Primary | ICD-10-CM

## 2023-02-01 DIAGNOSIS — D64.9 CHRONIC ANEMIA: ICD-10-CM

## 2023-02-01 LAB
25(OH)D3 SERPL-MCNC: 44.4 NG/ML (ref 30–100)
ALBUMIN SERPL-MCNC: 4.2 G/DL (ref 3.5–5.2)
ANION GAP SERPL CALCULATED.3IONS-SCNC: 9.2 MMOL/L (ref 5–15)
BUN SERPL-MCNC: 12 MG/DL (ref 8–23)
BUN/CREAT SERPL: 17.6 (ref 7–25)
CALCIUM SPEC-SCNC: 9.3 MG/DL (ref 8.6–10.5)
CHLORIDE SERPL-SCNC: 103 MMOL/L (ref 98–107)
CO2 SERPL-SCNC: 25.8 MMOL/L (ref 22–29)
CREAT SERPL-MCNC: 0.68 MG/DL (ref 0.57–1)
DEPRECATED RDW RBC AUTO: 42.2 FL (ref 37–54)
EGFRCR SERPLBLD CKD-EPI 2021: 99.2 ML/MIN/1.73
ERYTHROCYTE [DISTWIDTH] IN BLOOD BY AUTOMATED COUNT: 12.7 % (ref 12.3–15.4)
FERRITIN SERPL-MCNC: 50.3 NG/ML (ref 13–150)
FOLATE SERPL-MCNC: >20 NG/ML (ref 4.78–24.2)
GLUCOSE SERPL-MCNC: 89 MG/DL (ref 65–99)
HCT VFR BLD AUTO: 38 % (ref 34–46.6)
HGB BLD-MCNC: 12.5 G/DL (ref 12–15.9)
IRON 24H UR-MRATE: 139 MCG/DL (ref 37–145)
IRON SATN MFR SERPL: 33 % (ref 20–50)
MCH RBC QN AUTO: 29.8 PG (ref 26.6–33)
MCHC RBC AUTO-ENTMCNC: 32.9 G/DL (ref 31.5–35.7)
MCV RBC AUTO: 90.5 FL (ref 79–97)
PHOSPHATE SERPL-MCNC: 3.7 MG/DL (ref 2.5–4.5)
PLATELET # BLD AUTO: 282 10*3/MM3 (ref 140–450)
PMV BLD AUTO: 10.5 FL (ref 6–12)
POTASSIUM SERPL-SCNC: 4.1 MMOL/L (ref 3.5–5.2)
PTH-INTACT SERPL-MCNC: 32.6 PG/ML (ref 15–65)
RBC # BLD AUTO: 4.2 10*6/MM3 (ref 3.77–5.28)
SODIUM SERPL-SCNC: 138 MMOL/L (ref 136–145)
TIBC SERPL-MCNC: 420 MCG/DL (ref 298–536)
TRANSFERRIN SERPL-MCNC: 282 MG/DL (ref 200–360)
VIT B12 BLD-MCNC: 767 PG/ML (ref 211–946)
WBC NRBC COR # BLD: 7.16 10*3/MM3 (ref 3.4–10.8)

## 2023-02-01 PROCEDURE — 82746 ASSAY OF FOLIC ACID SERUM: CPT

## 2023-02-01 PROCEDURE — 82306 VITAMIN D 25 HYDROXY: CPT

## 2023-02-01 PROCEDURE — 85027 COMPLETE CBC AUTOMATED: CPT

## 2023-02-01 PROCEDURE — 82728 ASSAY OF FERRITIN: CPT

## 2023-02-01 PROCEDURE — 83540 ASSAY OF IRON: CPT

## 2023-02-01 PROCEDURE — 36415 COLL VENOUS BLD VENIPUNCTURE: CPT

## 2023-02-01 PROCEDURE — 99214 OFFICE O/P EST MOD 30 MIN: CPT

## 2023-02-01 PROCEDURE — 83970 ASSAY OF PARATHORMONE: CPT

## 2023-02-01 PROCEDURE — 82607 VITAMIN B-12: CPT

## 2023-02-01 PROCEDURE — 80069 RENAL FUNCTION PANEL: CPT

## 2023-02-01 PROCEDURE — 84466 ASSAY OF TRANSFERRIN: CPT

## 2023-02-01 RX ORDER — TETRACYCLINE HYDROCHLORIDE 500 MG/1
500 CAPSULE ORAL 2 TIMES DAILY
Qty: 14 CAPSULE | Refills: 0 | Status: SHIPPED | OUTPATIENT
Start: 2023-02-01 | End: 2023-02-08

## 2023-02-01 RX ORDER — HYDROCODONE BITARTRATE AND ACETAMINOPHEN 7.5; 325 MG/1; MG/1
1 TABLET ORAL EVERY 6 HOURS PRN
COMMUNITY
Start: 2023-01-12

## 2023-02-01 NOTE — PROGRESS NOTES
Chief Complaint   Patient presents with   • Insect Bite     Spider bite on thumb since October. In December she was seen and was given Amoxicillin and she couldn't take it. She had cut her other hand and Devin and Olivia cleaned her thumb at then, still looks infected and hurts really bad. They had also had her put [place] on it but it was making it itch really bad.        Subjective          Viviana Hodges presents to [facility]    History of Present Illness  She is here to be seen for insect bite/spider bite on thumb since October. She was seen in December and was given augmentin. She states she couldn't take it because it gave her diarrhea. She had cut her left hand and had to have hand surgery done on that and had the hand surgeon debride the thumb wound while she was under anesthesia. Today the area is bigger and more inflamed than it was before debridement. I have advised her to go back to the hand surgeon to see about removal and possible skin graft,which they had discussed previously. I have also suggested she go to dermatology for evaluation of possible squamous cell carcinoma as it does look similar to that and she states she is not 100% sure it was a spider bite.      Past History:  Medical History: has a past medical history of Allergic, Angina at rest (HCC), Anxiety, Bleeding ulcer, Bursitis of left shoulder (03/26/2018), CAD (coronary artery disease) (04/20/2015), Chronic allergic rhinitis, DDD (degenerative disc disease), lumbar (02/22/2016), GERD (gastroesophageal reflux disease) (02/22/2016), History of coronary artery stent placement, HLD (hyperlipidemia), HTN (hypertension), IBS (irritable bowel syndrome) (02/22/2016), MDD (major depressive disorder), Nondisplaced fracture of proximal end of right fibula (08/16/2017), OA (osteoarthritis), Obesity (BMI 30-39.9), and Seasonal allergies.   Surgical History: has a past surgical history that includes Coronary  "angioplasty with stent;  section (); Colonoscopy; Eye surgery; Cardiac surgery; Hysterectomy (); Laparoscopic gastric banding (); Tonsillectomy; Esophagogastroduodenoscopy (N/A, 3/1/2022); Colonoscopy (N/A, 3/2/2022); and Esophagogastroduodenoscopy (N/A, 2022).   Family History: family history includes Arthritis in her father and mother; Diabetes in her father, mother, and another family member; Heart disease in her father, mother, and another family member; Osteoporosis in her mother; Stroke in her sister.   Social History: reports that she quit smoking about 16 years ago. Her smoking use included cigarettes. She started smoking about 41 years ago. She has a 12.50 pack-year smoking history. She has never used smokeless tobacco. She reports current alcohol use. She reports that she does not use drugs.  Allergies: Shellfish allergy, Shellfish-derived products, and Penicillins  (Not in a hospital admission)       Social History     Socioeconomic History   • Marital status:    Tobacco Use   • Smoking status: Former     Packs/day: 0.50     Years: 25.00     Pack years: 12.50     Types: Cigarettes     Start date:      Quit date:      Years since quittin.0   • Smokeless tobacco: Never   • Tobacco comments:     QUIT 15 YEARS AGO   Vaping Use   • Vaping Use: Never used   Substance and Sexual Activity   • Alcohol use: Yes     Comment: Occ; has been drinking for 31 or more years   • Drug use: Never   • Sexual activity: Defer       There are no preventive care reminders to display for this patient.    Objective     Vital Signs:   /68 (BP Location: Left arm, Patient Position: Sitting)   Pulse 64   Temp 97.7 °F (36.5 °C) (Infrared)   Ht 172.7 cm (67.99\")   Wt 89 kg (196 lb 4.8 oz)   SpO2 97%   BMI 29.86 kg/m²       Physical Exam  Constitutional:       Appearance: Normal appearance.   HENT:      Nose: Nose normal.      Mouth/Throat:      Mouth: Mucous membranes are moist. "   Cardiovascular:      Rate and Rhythm: Normal rate and regular rhythm.   Pulmonary:      Effort: Pulmonary effort is normal.      Breath sounds: Normal breath sounds.   Skin:     General: Skin is warm and dry.      Findings: Lesion present.   Neurological:      General: No focal deficit present.      Mental Status: She is alert and oriented to person, place, and time.   Psychiatric:         Mood and Affect: Mood normal.         Behavior: Behavior normal.          Review of Systems   Skin: Positive for skin lesions.   All other systems reviewed and are negative.       Result Review :                 Assessment and Plan    Diagnoses and all orders for this visit:    1. Necrotic wound of right hand, subsequent encounter ([facility]) (Primary)  -     Ambulatory Referral to Hand Surgery  -     tetracycline (ACHROMYCIN,SUMYCIN) 500 MG capsule; Take 1 capsule by mouth 2 (Two) Times a Day for 7 days.  Dispense: 14 capsule; Refill: 0  -     Ambulatory Referral to Dermatology    2. Hand lesion  -     Ambulatory Referral to Dermatology    Need evaluation for squamous cell carcinoma by dermatology.       I spent 35 minutes caring for Viviana on this date of service. This time includes time spent by me in the following activities:preparing for the visit, reviewing tests, obtaining and/or reviewing a separately obtained history, performing a medically appropriate examination and/or evaluation , counseling and educating the patient/family/caregiver, ordering medications, tests, or procedures and documenting information in the medical record    Pt thought to be clinically stable at this time.    Follow Up   No follow-ups on file.  Patient was given instructions and counseling regarding her condition or for health maintenance advice. Please see specific information pulled into the AVS if appropriate.

## 2023-04-27 ENCOUNTER — LAB (OUTPATIENT)
Dept: LAB | Facility: HOSPITAL | Age: 63
End: 2023-04-27
Payer: COMMERCIAL

## 2023-04-27 ENCOUNTER — OFFICE VISIT (OUTPATIENT)
Dept: FAMILY MEDICINE CLINIC | Facility: CLINIC | Age: 63
End: 2023-04-27
Payer: COMMERCIAL

## 2023-04-27 VITALS
OXYGEN SATURATION: 100 % | BODY MASS INDEX: 31.08 KG/M2 | SYSTOLIC BLOOD PRESSURE: 149 MMHG | HEART RATE: 64 BPM | DIASTOLIC BLOOD PRESSURE: 83 MMHG | WEIGHT: 198 LBS | HEIGHT: 67 IN | TEMPERATURE: 98.6 F | RESPIRATION RATE: 12 BRPM

## 2023-04-27 DIAGNOSIS — I25.10 CORONARY ARTERY DISEASE INVOLVING NATIVE HEART WITHOUT ANGINA PECTORIS, UNSPECIFIED VESSEL OR LESION TYPE: ICD-10-CM

## 2023-04-27 DIAGNOSIS — D64.9 CHRONIC ANEMIA: ICD-10-CM

## 2023-04-27 DIAGNOSIS — K25.9 STRESS ULCER OF STOMACH: ICD-10-CM

## 2023-04-27 DIAGNOSIS — Z00.00 PHYSICAL EXAM, ANNUAL: Primary | ICD-10-CM

## 2023-04-27 DIAGNOSIS — K58.9 IRRITABLE BOWEL SYNDROME WITHOUT DIARRHEA: ICD-10-CM

## 2023-04-27 DIAGNOSIS — D50.0 IRON DEFICIENCY ANEMIA DUE TO CHRONIC BLOOD LOSS: ICD-10-CM

## 2023-04-27 DIAGNOSIS — E66.09 CLASS 1 OBESITY DUE TO EXCESS CALORIES WITH SERIOUS COMORBIDITY AND BODY MASS INDEX (BMI) OF 31.0 TO 31.9 IN ADULT: Chronic | ICD-10-CM

## 2023-04-27 DIAGNOSIS — E78.2 MIXED HYPERLIPIDEMIA: Chronic | ICD-10-CM

## 2023-04-27 DIAGNOSIS — E78.2 MIXED HYPERLIPIDEMIA: ICD-10-CM

## 2023-04-27 DIAGNOSIS — R14.0 BLOATING: ICD-10-CM

## 2023-04-27 DIAGNOSIS — I10 PRIMARY HYPERTENSION: Chronic | ICD-10-CM

## 2023-04-27 PROBLEM — J30.9 ALLERGIC RHINITIS: Status: ACTIVE | Noted: 2022-03-07

## 2023-04-27 PROBLEM — F41.9 ANXIETY AND DEPRESSION: Status: ACTIVE | Noted: 2023-04-27

## 2023-04-27 PROBLEM — F32.9 MAJOR DEPRESSIVE DISORDER: Status: ACTIVE | Noted: 2022-03-07

## 2023-04-27 PROBLEM — K92.2 UPPER GI BLEED: Status: ACTIVE | Noted: 2023-04-27

## 2023-04-27 PROBLEM — I20.8 ANGINA DECUBITUS: Status: ACTIVE | Noted: 2023-04-27

## 2023-04-27 PROBLEM — F32.A ANXIETY AND DEPRESSION: Status: ACTIVE | Noted: 2023-04-27

## 2023-04-27 PROBLEM — Z98.84 H/O GASTRIC BYPASS: Status: ACTIVE | Noted: 2023-04-27

## 2023-04-27 PROBLEM — I20.0 PREINFARCTION SYNDROME: Status: ACTIVE | Noted: 2019-04-10

## 2023-04-27 PROBLEM — I20.89 ANGINA DECUBITUS: Status: ACTIVE | Noted: 2023-04-27

## 2023-04-27 PROBLEM — M19.90 OSTEOARTHRITIS: Status: ACTIVE | Noted: 2022-03-07

## 2023-04-27 LAB
CHOLEST SERPL-MCNC: 142 MG/DL (ref 0–200)
DEPRECATED RDW RBC AUTO: 45.2 FL (ref 37–54)
ERYTHROCYTE [DISTWIDTH] IN BLOOD BY AUTOMATED COUNT: 13.9 % (ref 12.3–15.4)
FERRITIN SERPL-MCNC: 84.4 NG/ML (ref 13–150)
FOLATE SERPL-MCNC: 17.1 NG/ML (ref 4.78–24.2)
HCT VFR BLD AUTO: 41.6 % (ref 34–46.6)
HDLC SERPL-MCNC: 54 MG/DL (ref 40–60)
HGB BLD-MCNC: 13.9 G/DL (ref 12–15.9)
IRON 24H UR-MRATE: 103 MCG/DL (ref 37–145)
IRON SATN MFR SERPL: 22 % (ref 20–50)
LDLC SERPL CALC-MCNC: 71 MG/DL (ref 0–100)
LDLC/HDLC SERPL: 1.29 {RATIO}
MCH RBC QN AUTO: 30 PG (ref 26.6–33)
MCHC RBC AUTO-ENTMCNC: 33.4 G/DL (ref 31.5–35.7)
MCV RBC AUTO: 89.8 FL (ref 79–97)
PLATELET # BLD AUTO: 270 10*3/MM3 (ref 140–450)
PMV BLD AUTO: 10.7 FL (ref 6–12)
RBC # BLD AUTO: 4.63 10*6/MM3 (ref 3.77–5.28)
TIBC SERPL-MCNC: 466 MCG/DL (ref 298–536)
TRANSFERRIN SERPL-MCNC: 313 MG/DL (ref 200–360)
TRIGL SERPL-MCNC: 92 MG/DL (ref 0–150)
VIT B12 BLD-MCNC: 1139 PG/ML (ref 211–946)
VLDLC SERPL-MCNC: 17 MG/DL (ref 5–40)
WBC NRBC COR # BLD: 7.6 10*3/MM3 (ref 3.4–10.8)

## 2023-04-27 PROCEDURE — 83540 ASSAY OF IRON: CPT

## 2023-04-27 PROCEDURE — 82746 ASSAY OF FOLIC ACID SERUM: CPT

## 2023-04-27 PROCEDURE — 80061 LIPID PANEL: CPT

## 2023-04-27 PROCEDURE — 99396 PREV VISIT EST AGE 40-64: CPT | Performed by: FAMILY MEDICINE

## 2023-04-27 PROCEDURE — 82607 VITAMIN B-12: CPT

## 2023-04-27 PROCEDURE — 83516 IMMUNOASSAY NONANTIBODY: CPT

## 2023-04-27 PROCEDURE — 84466 ASSAY OF TRANSFERRIN: CPT

## 2023-04-27 PROCEDURE — 85027 COMPLETE CBC AUTOMATED: CPT

## 2023-04-27 PROCEDURE — 36415 COLL VENOUS BLD VENIPUNCTURE: CPT

## 2023-04-27 PROCEDURE — 82728 ASSAY OF FERRITIN: CPT

## 2023-04-27 RX ORDER — ONDANSETRON 8 MG/1
8 TABLET, ORALLY DISINTEGRATING ORAL EVERY 8 HOURS PRN
Qty: 15 TABLET | Refills: 2 | Status: SHIPPED | OUTPATIENT
Start: 2023-04-27

## 2023-04-27 RX ORDER — CARVEDILOL 6.25 MG/1
TABLET ORAL
COMMUNITY
Start: 2023-03-06 | End: 2023-05-14

## 2023-04-27 RX ORDER — SEMAGLUTIDE 0.5 MG/.5ML
0.5 INJECTION, SOLUTION SUBCUTANEOUS WEEKLY
Qty: 2 ML | Refills: 0 | Status: SHIPPED | OUTPATIENT
Start: 2023-04-27

## 2023-04-27 RX ORDER — SEMAGLUTIDE 1.7 MG/.75ML
0.5 INJECTION, SOLUTION SUBCUTANEOUS WEEKLY
Qty: 2 ML | Refills: 0 | Status: SHIPPED | OUTPATIENT
Start: 2023-04-27

## 2023-04-27 RX ORDER — SEMAGLUTIDE 2.4 MG/.75ML
0.75 INJECTION, SOLUTION SUBCUTANEOUS WEEKLY
Qty: 3 ML | Refills: 0 | Status: SHIPPED | OUTPATIENT
Start: 2023-04-27

## 2023-04-27 RX ORDER — SEMAGLUTIDE 1 MG/.5ML
0.5 INJECTION, SOLUTION SUBCUTANEOUS WEEKLY
Qty: 2 ML | Refills: 0 | Status: SHIPPED | OUTPATIENT
Start: 2023-04-27

## 2023-04-27 NOTE — PROGRESS NOTES
"Chief Complaint  Annual Exam and Weight Loss    Subjective        Viviana Hodges presents to [facility]  History of Present Illness    Presents with complaint of annual exam and wanting to lose weight has had surgery in the past to improve but would like to get BMI under 30.  She has had medicines in the past for weight loss and would benefit in interested in weekly injection Wegovy.     She is having some concerns about abdominal issues irritation bloating history of heartburn and esophagitis gastritis on PPI, consider doing celiac testing and further labs.     Skin cancer was removed on hand where she had prior injury she is improving steadily from this injury but still difficult with some things she does with her hand        Objective   Vital Signs:  /83   Pulse 64   Temp 98.6 °F (37 °C)   Resp 12   Ht 170.2 cm (67\")   Wt 89.8 kg (198 lb)   SpO2 100%   BMI 31.01 kg/m²   Estimated body mass index is 31.01 kg/m² as calculated from the following:    Height as of this encounter: 170.2 cm (67\").    Weight as of this encounter: 89.8 kg (198 lb).             Physical Exam  Vitals reviewed.   Constitutional:       Appearance: Normal appearance. She is well-developed.   HENT:      Head: Normocephalic and atraumatic.      Right Ear: External ear normal.      Left Ear: External ear normal.      Nose: Nose normal.   Eyes:      Conjunctiva/sclera: Conjunctivae normal.      Pupils: Pupils are equal, round, and reactive to light.   Cardiovascular:      Rate and Rhythm: Normal rate.   Pulmonary:      Effort: Pulmonary effort is normal.      Breath sounds: Normal breath sounds.   Abdominal:      General: There is no distension.   Skin:     General: Skin is warm and dry.   Neurological:      Mental Status: She is alert and oriented to person, place, and time. Mental status is at baseline.   Psychiatric:         Mood and Affect: Mood and affect normal.         Behavior: " Behavior normal.         Thought Content: Thought content normal.         Judgment: Judgment normal.        Result Review :  The following data was reviewed by: Delfin Bhandari DO on 04/27/2023:  Common labs        9/19/2022    10:55 2/1/2023    10:54 4/27/2023    12:05   Common Labs   Glucose  89      BUN  12      Creatinine  0.68      Sodium  138      Potassium  4.1      Chloride  103      Calcium  9.3      Albumin  4.2      WBC 6.98   7.16   7.60     Hemoglobin 9.0   12.5   13.9     Hematocrit 28.4   38.0   41.6     Platelets 335   282   270     Total Cholesterol   142     Triglycerides   92     HDL Cholesterol   54     LDL Cholesterol    71       Data reviewed: Consultant notes dermatology who remove skin cancer from hand, follow up on any skin concerns with dermatology and monitor             Assessment and Plan   Diagnoses and all orders for this visit:    1. Physical exam, annual (Primary)    2. Mixed hyperlipidemia  -     Lipid panel; Future  -     Semaglutide-Weight Management 0.25 MG/0.5ML solution auto-injector; Inject 1 each under the skin into the appropriate area as directed 1 (One) Time Per Week.  Dispense: 2 mL; Refill: 0  -     Semaglutide-Weight Management (Wegovy) 0.5 MG/0.5ML solution auto-injector; Inject 0.5 mL under the skin into the appropriate area as directed 1 (One) Time Per Week.  Dispense: 2 mL; Refill: 0  -     Semaglutide-Weight Management (Wegovy) 1 MG/0.5ML solution auto-injector; Inject 0.5 mL under the skin into the appropriate area as directed 1 (One) Time Per Week.  Dispense: 2 mL; Refill: 0  -     Semaglutide-Weight Management (Wegovy) 1.7 MG/0.75ML solution auto-injector; Inject 0.5 mL under the skin into the appropriate area as directed 1 (One) Time Per Week.  Dispense: 2 mL; Refill: 0  -     Semaglutide-Weight Management (Wegovy) 2.4 MG/0.75ML solution auto-injector; Inject 0.75 mL under the skin into the appropriate area as directed 1 (One) Time Per Week.  Dispense: 3 mL;  Refill: 0  -     ondansetron ODT (ZOFRAN-ODT) 8 MG disintegrating tablet; Place 1 tablet on the tongue Every 8 (Eight) Hours As Needed for Nausea or Vomiting.  Dispense: 15 tablet; Refill: 2  -     Lipid panel; Future  -     Microalbumin / Creatinine Urine Ratio - Urine, Clean Catch; Future  -     Comprehensive metabolic panel; Future    3. Class 1 obesity due to excess calories with serious comorbidity and body mass index (BMI) of 31.0 to 31.9 in adult  -     Semaglutide-Weight Management 0.25 MG/0.5ML solution auto-injector; Inject 1 each under the skin into the appropriate area as directed 1 (One) Time Per Week.  Dispense: 2 mL; Refill: 0  -     Semaglutide-Weight Management (Wegovy) 0.5 MG/0.5ML solution auto-injector; Inject 0.5 mL under the skin into the appropriate area as directed 1 (One) Time Per Week.  Dispense: 2 mL; Refill: 0  -     Semaglutide-Weight Management (Wegovy) 1 MG/0.5ML solution auto-injector; Inject 0.5 mL under the skin into the appropriate area as directed 1 (One) Time Per Week.  Dispense: 2 mL; Refill: 0  -     Semaglutide-Weight Management (Wegovy) 1.7 MG/0.75ML solution auto-injector; Inject 0.5 mL under the skin into the appropriate area as directed 1 (One) Time Per Week.  Dispense: 2 mL; Refill: 0  -     Semaglutide-Weight Management (Wegovy) 2.4 MG/0.75ML solution auto-injector; Inject 0.75 mL under the skin into the appropriate area as directed 1 (One) Time Per Week.  Dispense: 3 mL; Refill: 0  -     ondansetron ODT (ZOFRAN-ODT) 8 MG disintegrating tablet; Place 1 tablet on the tongue Every 8 (Eight) Hours As Needed for Nausea or Vomiting.  Dispense: 15 tablet; Refill: 2  -     Microalbumin / Creatinine Urine Ratio - Urine, Clean Catch; Future  -     Comprehensive metabolic panel; Future    4. Coronary artery disease involving native heart without angina pectoris, unspecified vessel or lesion type  -     Comprehensive metabolic panel; Future    5. Irritable bowel syndrome without  diarrhea  -     Gluten Sensitivity Antibodies Cascade; Future    6. Bloating  -     Gluten Sensitivity Antibodies Cascade; Future    7. Primary hypertension  -     Microalbumin / Creatinine Urine Ratio - Urine, Clean Catch; Future    8. Chronic anemia  -     CBC (No Diff); Future  -     Iron Profile; Future  -     Ferritin; Future  -     Vitamin B12; Future  -     Folate; Future  -     TSH+Free T4; Future  -     Comprehensive metabolic panel; Future    Other orders  -     carvedilol (COREG) 6.25 MG tablet; Take 1 tablet by mouth 2 (Two) Times a Day With Meals.  Dispense: 180 tablet; Refill: 3      Reviewed chronic conditions, age risk factors and will order labs today to manage, screen and follow up at least every 6 months    Recommended and reviewed age appropriate immunizations cancer screenings today     Recommend wearing sunscreen and following up on any concerning skin conditions or lesions, wearing seat belts and other risk reduction measures to lessen incident of death, disease or other     Continue to monitor and manage weight, goal BMI approaching 30, calorie restriction <5400-0334 and activity up to 150 min a week suggested to help get with weight loss  *start wegovy, follow up monthly to monitor, titrating doses prescribed    Counseled on risks, disease and advice given on screening and continued management of health and condition through the next year until next physical     Monitor blood pressure at home goal less than 140/90 if remains elevated similarly will need to adjust medicine, continue Imdur carvedilol additionally Plavix as prescribed    Continue Protonix for heartburn reduction of symptoms and management of chronic GERD and gastritis    Continue Zetia and simvastatin for cholesterol goal LDL less than 100 recheck at least every 6 to 12 months    Stable on bupropion 300 mg for anxiety depression no SI HI or other    Repeat labs 10/2023 or so          Follow Up   Return in about 6 weeks (around  6/8/2023) for Weight check.  Patient was given instructions and counseling regarding her condition or for health maintenance advice. Please see specific information pulled into the AVS if appropriate.

## 2023-05-14 PROBLEM — E66.811 CLASS 1 OBESITY DUE TO EXCESS CALORIES WITH SERIOUS COMORBIDITY AND BODY MASS INDEX (BMI) OF 31.0 TO 31.9 IN ADULT: Status: ACTIVE | Noted: 2023-05-14

## 2023-05-14 PROBLEM — E66.09 CLASS 1 OBESITY DUE TO EXCESS CALORIES WITH SERIOUS COMORBIDITY AND BODY MASS INDEX (BMI) OF 31.0 TO 31.9 IN ADULT: Status: ACTIVE | Noted: 2023-05-14

## 2023-05-14 RX ORDER — CARVEDILOL 6.25 MG/1
6.25 TABLET ORAL 2 TIMES DAILY WITH MEALS
Qty: 180 TABLET | Refills: 3
Start: 2023-05-14

## 2023-10-09 DIAGNOSIS — E78.2 MIXED HYPERLIPIDEMIA: Chronic | ICD-10-CM

## 2023-10-09 DIAGNOSIS — E66.09 CLASS 1 OBESITY DUE TO EXCESS CALORIES WITH SERIOUS COMORBIDITY AND BODY MASS INDEX (BMI) OF 31.0 TO 31.9 IN ADULT: Chronic | ICD-10-CM

## 2023-10-10 RX ORDER — ONDANSETRON 8 MG/1
TABLET, ORALLY DISINTEGRATING ORAL
Qty: 15 TABLET | Refills: 1 | Status: SHIPPED | OUTPATIENT
Start: 2023-10-10

## 2023-10-10 RX ORDER — SEMAGLUTIDE 2.4 MG/.75ML
INJECTION, SOLUTION SUBCUTANEOUS
Qty: 3 ML | Refills: 0 | Status: SHIPPED | OUTPATIENT
Start: 2023-10-10

## 2023-10-11 ENCOUNTER — LAB (OUTPATIENT)
Dept: LAB | Facility: HOSPITAL | Age: 63
End: 2023-10-11
Payer: COMMERCIAL

## 2023-10-11 DIAGNOSIS — I25.10 CORONARY ARTERY DISEASE INVOLVING NATIVE HEART WITHOUT ANGINA PECTORIS, UNSPECIFIED VESSEL OR LESION TYPE: ICD-10-CM

## 2023-10-11 DIAGNOSIS — I10 PRIMARY HYPERTENSION: Chronic | ICD-10-CM

## 2023-10-11 DIAGNOSIS — E78.2 MIXED HYPERLIPIDEMIA: Chronic | ICD-10-CM

## 2023-10-11 DIAGNOSIS — D64.9 CHRONIC ANEMIA: ICD-10-CM

## 2023-10-11 DIAGNOSIS — E66.09 CLASS 1 OBESITY DUE TO EXCESS CALORIES WITH SERIOUS COMORBIDITY AND BODY MASS INDEX (BMI) OF 31.0 TO 31.9 IN ADULT: Chronic | ICD-10-CM

## 2023-10-11 LAB
ALBUMIN SERPL-MCNC: 4.1 G/DL (ref 3.5–5.2)
ALBUMIN UR-MCNC: <1.2 MG/DL
ALBUMIN/GLOB SERPL: 1.7 G/DL
ALP SERPL-CCNC: 66 U/L (ref 39–117)
ALT SERPL W P-5'-P-CCNC: 30 U/L (ref 1–33)
ANION GAP SERPL CALCULATED.3IONS-SCNC: 9.5 MMOL/L (ref 5–15)
AST SERPL-CCNC: 27 U/L (ref 1–32)
BILIRUB SERPL-MCNC: 0.4 MG/DL (ref 0–1.2)
BUN SERPL-MCNC: 9 MG/DL (ref 8–23)
BUN/CREAT SERPL: 11.5 (ref 7–25)
CALCIUM SPEC-SCNC: 9.1 MG/DL (ref 8.6–10.5)
CHLORIDE SERPL-SCNC: 106 MMOL/L (ref 98–107)
CHOLEST SERPL-MCNC: 140 MG/DL (ref 0–200)
CO2 SERPL-SCNC: 24.5 MMOL/L (ref 22–29)
CREAT SERPL-MCNC: 0.78 MG/DL (ref 0.57–1)
CREAT UR-MCNC: 211.9 MG/DL
DEPRECATED RDW RBC AUTO: 41.2 FL (ref 37–54)
EGFRCR SERPLBLD CKD-EPI 2021: 86.5 ML/MIN/1.73
ERYTHROCYTE [DISTWIDTH] IN BLOOD BY AUTOMATED COUNT: 12.1 % (ref 12.3–15.4)
FERRITIN SERPL-MCNC: 136 NG/ML (ref 13–150)
FOLATE SERPL-MCNC: >20 NG/ML (ref 4.78–24.2)
GLOBULIN UR ELPH-MCNC: 2.4 GM/DL
GLUCOSE SERPL-MCNC: 84 MG/DL (ref 65–99)
HCT VFR BLD AUTO: 42.9 % (ref 34–46.6)
HDLC SERPL-MCNC: 62 MG/DL (ref 40–60)
HGB BLD-MCNC: 14.7 G/DL (ref 12–15.9)
IRON 24H UR-MRATE: 83 MCG/DL (ref 37–145)
IRON SATN MFR SERPL: 23 % (ref 20–50)
LDLC SERPL CALC-MCNC: 62 MG/DL (ref 0–100)
LDLC/HDLC SERPL: 0.99 {RATIO}
MCH RBC QN AUTO: 31.9 PG (ref 26.6–33)
MCHC RBC AUTO-ENTMCNC: 34.3 G/DL (ref 31.5–35.7)
MCV RBC AUTO: 93.1 FL (ref 79–97)
MICROALBUMIN/CREAT UR: NORMAL MG/G{CREAT}
PLATELET # BLD AUTO: 282 10*3/MM3 (ref 140–450)
PMV BLD AUTO: 10.9 FL (ref 6–12)
POTASSIUM SERPL-SCNC: 4.2 MMOL/L (ref 3.5–5.2)
PROT SERPL-MCNC: 6.5 G/DL (ref 6–8.5)
RBC # BLD AUTO: 4.61 10*6/MM3 (ref 3.77–5.28)
SODIUM SERPL-SCNC: 140 MMOL/L (ref 136–145)
T4 FREE SERPL-MCNC: 1.32 NG/DL (ref 0.93–1.7)
TIBC SERPL-MCNC: 358 MCG/DL (ref 298–536)
TRANSFERRIN SERPL-MCNC: 240 MG/DL (ref 200–360)
TRIGL SERPL-MCNC: 82 MG/DL (ref 0–150)
TSH SERPL DL<=0.05 MIU/L-ACNC: 2.35 UIU/ML (ref 0.27–4.2)
VIT B12 BLD-MCNC: 1234 PG/ML (ref 211–946)
VLDLC SERPL-MCNC: 16 MG/DL (ref 5–40)
WBC NRBC COR # BLD: 7.54 10*3/MM3 (ref 3.4–10.8)

## 2023-10-11 PROCEDURE — 82043 UR ALBUMIN QUANTITATIVE: CPT

## 2023-10-11 PROCEDURE — 82728 ASSAY OF FERRITIN: CPT

## 2023-10-11 PROCEDURE — 82607 VITAMIN B-12: CPT

## 2023-10-11 PROCEDURE — 82570 ASSAY OF URINE CREATININE: CPT

## 2023-10-11 PROCEDURE — 84439 ASSAY OF FREE THYROXINE: CPT

## 2023-10-11 PROCEDURE — 36415 COLL VENOUS BLD VENIPUNCTURE: CPT

## 2023-10-11 PROCEDURE — 82746 ASSAY OF FOLIC ACID SERUM: CPT

## 2023-10-11 PROCEDURE — 80050 GENERAL HEALTH PANEL: CPT

## 2023-10-11 PROCEDURE — 80061 LIPID PANEL: CPT

## 2023-10-11 PROCEDURE — 84466 ASSAY OF TRANSFERRIN: CPT

## 2023-10-11 PROCEDURE — 83540 ASSAY OF IRON: CPT

## 2023-10-16 DIAGNOSIS — E78.2 MIXED HYPERLIPIDEMIA: Chronic | ICD-10-CM

## 2023-10-16 DIAGNOSIS — E66.09 CLASS 1 OBESITY DUE TO EXCESS CALORIES WITH SERIOUS COMORBIDITY AND BODY MASS INDEX (BMI) OF 31.0 TO 31.9 IN ADULT: Chronic | ICD-10-CM

## 2023-10-16 RX ORDER — SEMAGLUTIDE 0.25 MG/.5ML
INJECTION, SOLUTION SUBCUTANEOUS
Qty: 2 ML | Refills: 0 | OUTPATIENT
Start: 2023-10-16

## 2023-11-01 DIAGNOSIS — K21.9 CHRONIC GERD: ICD-10-CM

## 2023-11-02 RX ORDER — PANTOPRAZOLE SODIUM 40 MG/1
TABLET, DELAYED RELEASE ORAL
Qty: 180 TABLET | Refills: 1 | Status: SHIPPED | OUTPATIENT
Start: 2023-11-02

## 2023-11-22 DIAGNOSIS — E78.2 MIXED HYPERLIPIDEMIA: Chronic | ICD-10-CM

## 2023-11-22 DIAGNOSIS — E66.09 CLASS 1 OBESITY DUE TO EXCESS CALORIES WITH SERIOUS COMORBIDITY AND BODY MASS INDEX (BMI) OF 31.0 TO 31.9 IN ADULT: Chronic | ICD-10-CM

## 2023-11-22 RX ORDER — SEMAGLUTIDE 2.4 MG/.75ML
INJECTION, SOLUTION SUBCUTANEOUS
Qty: 3 ML | Refills: 2 | Status: SHIPPED | OUTPATIENT
Start: 2023-11-22

## 2023-11-30 RX ORDER — BUPROPION HYDROCHLORIDE 300 MG/1
TABLET ORAL
Qty: 90 TABLET | Refills: 3 | Status: SHIPPED | OUTPATIENT
Start: 2023-11-30

## 2023-11-30 RX ORDER — CYCLOBENZAPRINE HCL 10 MG
TABLET ORAL
Qty: 270 TABLET | Refills: 3 | Status: SHIPPED | OUTPATIENT
Start: 2023-11-30

## 2023-12-07 NOTE — TELEPHONE ENCOUNTER
Caller: Viviana Hodges    Relationship to patient: Self    Best call back number: [phone]     Patient is needing: PATIENT CALLED STATING SHE WENT TO THE PHARMACY TO  omeprazole (priLOSEC) 40 MG capsule AND THEY TOLD HER THEY ARE NEEDING A PRIOR AUTHORIZATION FOR THIS MEDICATION. THE PATIENT STATED SHE HAS NOT HAD THIS PROBLEM BEFORE AND WOULD LIKE TO KNOW IF HER PCP CAN CALL HER IN [place] AGAIN INSTEAD. THE PATIENT WOULD LIKE A CALL BACK TO LET HER KNOW THIS HAS BEEN SENT TO THE PHARMACY PLEASE ADVISE THANK YOU.         [id number] [address] AT [facility] [address] - [phone]  - [phone]   [phone]     Physician Progress Note      Naomi Lane  CSN #:                  [id number]  :                       1942  ADMIT DATE:       2023 5:57 PM  DISCH DATE:  RESPONDING  PROVIDER #:        Bernadette Louis          QUERY TEXT:    Pt admitted with Sepsis and has encephalopathy documented. If possible, please   document in progress notes and discharge summary further specificity   regarding the type of encephalopathy:      The medical record reflects the following:  Risk Factors: Sepsis, UTI, C.DIff+  Clinical Indicators: Acute encephalopathy is noted from the H&P and throughout   progress notes to 23. Presented with AMS and UTI, Progress notes dated   23 do state \"acute Encephalopathy/Secondary to UTI\"  Treatment: Rocephin IV, Vancomycin IV    Ezio Sigala RN-BSN, [facility]  Clinical   O. [phone]. [email or web address]  [facility] [employer]  Options provided:  -- Hypertensive encephalopathy  -- Metabolic encephalopathy  -- Septic encephalopathy  -- Encephalopathy due to due to UTI/Infection  -- Other - I will add my own diagnosis  -- Disagree - Not applicable / Not valid  -- Disagree - Clinically unable to determine / Unknown  -- Refer to Clinical Documentation Reviewer    PROVIDER RESPONSE TEXT:    This patient has metabolic encephalopathy.     Query created by: Keith Forde on 2023 2:09 PM      Electronically signed by:  Bernadette Louis 2023 9:19 AM

## 2023-12-19 ENCOUNTER — E-VISIT (OUTPATIENT)
Dept: FAMILY MEDICINE CLINIC | Facility: TELEHEALTH | Age: 63
End: 2023-12-19
Payer: COMMERCIAL

## 2023-12-19 PROCEDURE — BRIGHTMDVISIT: Performed by: NURSE PRACTITIONER

## 2023-12-19 NOTE — EXTERNAL PATIENT INSTRUCTIONS
Note   Your symptoms are of a viral illness. Viral illnesses can last 7-10 days. Antibiotics do not treat viral infections. Viral infections are best treated with rest and staying well hydrated by drinking plenty of fluids. Your heart disease limits you on the medications you can take for some symptoms. If symptoms last longer than a week, follow up   Diagnosis   Viral upper respiratory infection (URI)   My name is TRUONG Rhodes, and I'm a healthcare provider at [facility]. I've reviewed your interview and based on your responses, I see that you have a viral upper respiratory infection. However, the exact type of virus causing your illness isn't clear.   Without testing, it can be hard to tell the difference between the common cold, the flu, or a COVID-19 infection. These illnesses share many of the same symptoms, including fever, fatigue, muscle or body aches, sore throat, runny or stuffy nose, and cough.   Most people with any of these illnesses have mild to moderate symptoms and can rest at home until they get better.   I haven't prescribed any antibiotics. Antibiotics fight bacteria, not viruses. They don't help when you have a viral infection like the common cold, the flu, or a COVID-19 infection. Antibiotics could even make you feel worse as they can cause or worsen nausea, diarrhea, and stomach pain.    Stay home   While you're recovering, STAY HOME. Do not leave your home except to get medical care.   While at home, avoid close contact with others.   If possible, stay in a room away from other people in your home, and use a separate bathroom.   Wear a face mask when you can't avoid contact with other people.   If you can't wear a face mask because of breathing difficulty, your caregiver should wear a face mask.   Wearing a face mask does NOT mean you can leave your home. You must continue to stay home until you have recovered.   You can return to your normal activities when ALL of the  "following are true:    You've been fever-free for at least 24 hours (1 full day) without using fever-reducing medications such as Tylenol    Your symptoms have improved    It's been at least 5 full days since your symptoms first started   Prevention    Cover your mouth and nose with a tissue when you cough or sneeze. Throw used tissues in a lined trash can right away, and wash your hands immediately after.    Avoid sharing personal items like dishes, utensils, towels, or bedding. Wash items thoroughly with soap and water after use.    Wash your hands often with soap and water for at least 20 seconds. If soap and water are not available, clean your hands with a hand  that contains at least 60% alcohol. Cover all surfaces of your hands and rub them together until they feel dry.    Avoid touching your face, especially their eyes, nose, and mouth.    Clean \"high-touch\" surfaces daily. \"High-touch\" surfaces include counters, tabletops, doorknobs, bathroom fixtures, toilets, phones, keyboards, tablets, and bedside tables. You can use soap, detergents, 60%-80% ethanol or isopropyl alcohol,  such as Windex, or bleach. All of these  are effective at killing the virus that causes COVID-19.    Limit contact with pets and other animals while sick. If you must care for your pet, wash your hands before and after you interact with them and wear a face mask.   What to expect   Follow the advice in the treatment section below and you should feel better within 7 to 14 days. You may continue to feel tired and have a cough for several weeks.   Medications   Non-prescription   Guaifenesin/dextromethorphan (1200mg/60mg): Take 1 tablet by mouth twice a day as needed for cough. Brands to look for include Mucinex DM Maximum Strength.   About your diagnosis   The common cold, the flu (influenza), and COVID-19 are respiratory illnesses that are caused by viruses. While the specific type of virus that causes " these infections is different, the symptoms can often be similar. Fortunately, most people who get these infections have mild symptoms and can rest at home until they get better. For elderly people and those with chronic medical problems, these infections can be serious or life-threatening.   When to seek care   Call us at [phone]   with any sudden or unexpected symptoms.   Call your healthcare provider immediately if you have any of the following:    Fever over 103F    Fever that doesn't come down when you take Tylenol or ibuprofen    Fever that returns after being gone for more than 24 hours    Fever for more than 4 days    Worsening shortness of breath or difficulty breathing   Go to your nearest ER or call 911 if you have any of the following:    Shortness of breath that makes it difficult to do simple things like get dressed, bathe, or comb your hair    Persistent chest pain or chest tightness    New confusion or difficulty staying alert    Bluish color to the lips or face   Other treatment    Rest and drink plenty of sugar-free fluids.    Use a clean humidifier or a cool-mist vaporizer in your room at night. Breathing humid air may help with nasal congestion.    Gargle with salt water several times a day to help your throat feel better. Cough drops and throat lozenges may provide extra relief. A teaspoon of honey stirred into warm water or weak tea can help soothe a sore throat and cough.    Try using a Neti Pot to flush out your stuffy nose and sinuses. Neti Pots are available at any drugstore without a prescription.    Avoid smoke and air pollution. Smoke can make infections worse.    Coronavirus (COVID-19) information   Common symptoms of COVID-19 include fever, cough, shortness of breath, fatigue, muscle or body aches, headaches, new loss of sense of taste or smell, sore throat, stuffy or runny nose, nausea or vomiting, and diarrhea. Most people who get COVID-19 have mild symptoms and can rest at  home until they get better. Elderly people and those with chronic medical problems may be at risk for more serious complications.   FAQs about the COVID-19 vaccine   Are the vaccines safe?   Yes. Hundreds of millions of people in the [place] have already safely received COVID-19 vaccines under the most intense safety monitoring in the history of the [place].   Do I need the vaccine if I've already had COVID?   Yes. Vaccination helps protect you even if you've already had COVID.   If you had COVID-19 and had symptoms, wait to get vaccinated until you've recovered and completed your isolation period.   If you tested positive for COVID-19 but did not have symptoms, you can get vaccinated after 5 full days have passed since you had a positive test, as long as you don't develop symptoms.   How many doses of the vaccine do I need?   Visit www.cdc.gov/coronavirus/2019-ncov/vaccines/stay-up-to-date.html   to find out how to stay up to date with your COVID-19 vaccines.   I'm immunocompromised. How many doses of the vaccine do I need?   For information on how immunocompromised people can stay up to date with their COVID-19 vaccines, visit www.cdc.gov/coronavirus/2019-ncov/vaccines/recommendations/immuno.html  .   What are the common side effects of the vaccine?   A sore arm, tiredness, headache, and muscle pain may occur within two days of getting the vaccine and last a day or two. For the Moderna or Pfizer vaccines, side effects are more common after the second dose. People over the age of 55 are less likely to have side effects than younger people.   After I'm up to date on vaccines, can I still get or spread COVID?   Yes, you can still get COVID, but your disease should be milder. And your risk of serious illness, hospitalization, and complications will be much lower, especially if you're up to date. Unfortunately, you can still spread COVID if you've been vaccinated. That's why it's important to follow isolation guidelines if you  get sick or test positive.   After I'm up to date on vaccines, can I go back to normal?   You should still wear a mask indoors in public if:    It's required by laws, rules, regulations, or local guidance.    You have a weakened immune system.    Your age puts you at increased risk of severe disease.    You have a medical condition that puts you at increased risk of severe disease.    Someone in your household has a weakened immune system, is at increased risk for severe disease, or is unvaccinated.    You're in an area of high transmission.   Where can I get a COVID-19 vaccine?   Visit [facility]'s website for more information. To find a COVID-19 vaccination site near you, visit [email or web address]  , call [phone]  , or text your zip code to [phone] ([employer]). Message and data rates may apply.   I've had close contact with someone who has COVID. Do I need to quarantine, and if so, for how long?   For the most current answer, including a calculator to determine whether you need to stay home and for how long, visit www.cdc.gov/coronavirus/2019-ncov/your-health/isolation.html  .   I've tested positive for COVID. How long do I need to isolate?   For the latest recommendations, including a calculator to determine how long you need to stay home, visit www.cdc.gov/coronavirus/2019-ncov/your-health/isolation.html  .   What if I develop symptoms that might be from COVID?   For the latest recommendations on what to do if you're sick, including when to seek emergency care, visit www.cdc.gov/coronavirus/2019-ncov/if-you-are-sick/index.html  .    Flu vaccine information   Who should get a flu vaccine?   Everyone 6 months of age and older should get a yearly flu vaccine.   When should I get vaccinated?   You should get a flu vaccine by the end of October. Once you're vaccinated, it takes about two weeks for antibodies to develop and protect you against the flu. That's why it's important to get vaccinated as soon as  possible.   After October, is it too late to get vaccinated?   No. You should still get vaccinated. As long as the flu viruses are still in your community, flu vaccines will remain available, even into January of next year or later.   Why do I need a flu vaccine EVERY year?   Flu viruses are constantly changing, so flu vaccines are usually updated from one season to the next. Your protection from the flu vaccine also lessens over time.   Is the flu vaccine safe?   Yes. Over the last 50 years, hundreds of millions of [place] have safely received the flu vaccines.   What are the side effects of flu vaccines?   You CANNOT get the flu from a flu vaccine. Common side effects of the flu shot include soreness, redness and/or swelling where the shot was given, low grade fever, and aches. Common side effects of the nasal spray flu vaccine for adults include runny nose, headaches, sore throat, and cough. For children, side effects include wheezing, vomiting, muscle aches, and fever.   Does the flu vaccine increase your risk of getting COVID-19?   No. There is no evidence that getting a flu vaccine increases your risk of getting COVID-19.   Is it safe to get the flu vaccine along with a COVID-19 vaccine?   Yes. It's safe to get the flu vaccine with a COVID-19 vaccine or booster.   Contact your healthcare provider TODAY for details on when and where to get your flu vaccine.   Your provider   Your diagnosis was provided by TRUONG Rhodes, a member of your trusted care team at [facility].   If you have any questions, call us at [phone]  .

## 2023-12-19 NOTE — E-VISIT TREATED
Chief Complaint: Cold, flu, COVID, sinus, hay fever, or seasonal allergies   Patient introduction   Patient is 63-year-old female with cough, congestion, nasal discharge, sore throat, and voice hoarseness that started less than 48 hours ago. Regarding date of symptom onset, patient writes: 12/17/23.   COVID-19 testing history, vaccination status, and exposure:    Patient was tested for COVID-19 within the last 24 hours. Test result was negative.    Has not received an updated [id number] COVID-19 vaccination (Pfizer-BioNTech or Moderna vaccine after September 12, 2023; or Novavax vaccine after October 3, 2023).    No known exposure to a person with a confirmed or suspected case of COVID-19.    No high-risk (household) exposure to COVID-19 within the last 14 days.   Risk factors for severe disease from COVID-19 infection    Age 50 or older.    Higher risk for severe disease from COVID-19 because of BMI >= 25.    Heart disease.    Hypertension.   Warning. The following may warrant further investigation:    Hypertension.   General presentation   Symptoms came on suddenly.   Fever:    No fever.   Sinus and nasal symptoms:    Nasal discharge.    Yellow nasal drainage.    Nasal drainage is thin.    Postnasal drip.    Sinus pain or pressure on or around the forehead, eyes, and nose.    Patient first noticed sinus pain less than 5 days ago.    Sinus pain is worse with Valsalva.    No itchy nose or sneezing.    No history of unhealed nasal septal ulcer/nasal wound.    No history of antibiotic treatment for sinus infection in the last year.    No history of deviated septum or nasal polyps.   Throat symptoms:    Sore throat.    Previous tonsillectomy.    Patient can swallow liquids and solid foods with ease.    Voice is mildly hoarse. Patient does not believe hoarseness is due to voice strain.   Head and body aches:    No headache.    No sweats.    No chills.    No myalgia.    No fatigue.   Cough:    Cough is not noticeably  "worse depending on time of day    Cough is not productive of sputum.   Wheezing and shortness of breath:    No COPD diagnosis.    No asthma diagnosis.    No wheezing.    No shortness of breath.    No previous albuterol inhaler use for URIs, bronchitis, or pneumonia.    No previous steroid inhaler use for URIs, bronchitis, or pneumonia.   Chest pain:    No chest pain.   Ear symptoms:    Current symptoms include pain, pressure, and fullness in the ear(s).   Dizziness:    No dizziness.   Allergies:    No history of allergies.   Flu exposure:    No recent known exposure to a person with a confirmed flu diagnosis.    Has not had a flu vaccine this season.   Not taking any over-the-counter medications for current symptoms.   Review of red flags/alarm symptoms:    No changes in alertness or awareness.    No paroxysmal cough followed by whoop on inspiration    No symptoms suggesting airway obstruction.    No decreased urination.   Risk factors for antibiotic resistance:    Antibiotic use for similar symptoms within the last 30 days.   Pregnancy/menstrual status/breastfeeding:    Patient is postmenopausal.   Self-exam:    Height: 5' 7\"    Weight: 174 lbs    No palatal petechiae.    Swollen/painful neck lymph nodes.   Recent antibiotic use:    Has taken antibiotics for similar symptoms within the past month.   Current medications   Currently taking ezetimibe-simvastatin 10-40 MG per tablet, carvedilol 6.25 MG tablet, pantoprazole 40 MG EC tablet, estradiol 0.1 MG/24HR patch, Wegovy 2.4 MG/0.75ML solution auto-injector, nitroglycerin 0.4 MG SL tablet, clopidogrel 75 MG tablet, isosorbide mononitrate 30 MG 24 hr tablet, buPROPion  MG 24 hr tablet, and aspirin 81 MG chewable tablet.   Medication allergies    Penicillins   Medication contraindication review   Patient has history of hypertension. Therefore, the following medication(s) will not be prescribed:    Metoclopramide.   "  Acetaminophen-diphenhydramine-phenylephrine.    Pseudoephedrine.    Aspirin-chlorpheniramine-phenylephrine.   No history of metoclopramide-associated dystonic reaction and tardive dyskinesia.   No known history of azithromycin-associated cholestatic jaundice or hepatic impairment.   Past medical history   Immune conditions:   No immunocompromising conditions.   No history of cancer.   Social history   Never smoked tobacco.   Patient did not request an excuse note.   Assessment   Viral URI, cannot rule out influenza or COVID-19.   Based on the patient's interview responses and presenting symptoms, exact etiology of illness is unclear. Differential includes viral URI, influenza, and COVID-19.   Patient is at higher risk for severe disease from COVID-19 infection or complications from influenza.   Other possible indications for empiric antiviral treatment include:    Symptom onset less than 48 hours ago   Plan   Medications:    guaifenesin/dextromethorphan 1200 mg/60 mg tablet OTC 1200mg/60mg 1 tab PO bid PRN 10d for cough. Brands to look for include Mucinex DM Maximum Strength. Amount is 20 tab.   No prescriptions were ordered to treat this patient. The patient selected the following pharmacy during their interview, but no prescriptions were sent:   [facility]   [address] [place] [address]   Phone: [phone]     Fax: [phone]   Patient informed to purchase OTC medication.   Education:    Condition and causes    Prevention    Treatment and self-care    When to call provider   ----------   Electronically signed by TRUONG Rhodes on 2023-12-19 at 14:54PM   ----------   Patient Interview Transcript:   Which of these symptoms are bothering you? Select all that apply.    Cough    Stuffed-up nose or sinuses    Runny nose    Sore throat    Hoarse voice or loss of voice   Not selected:    Shortness of breath    Itchy or watery eyes    Itchy nose or sneezing    Loss of smell or  "taste    Headache    Fever    Sweats    Chills    Muscle or body aches    Fatigue or tiredness    Nausea or vomiting    Diarrhea    I don't have any of these symptoms   When did your current symptoms start? Select one.    Less than 48 hours ago   Not selected:    3 to 5 days ago    6 to 9 days ago    10 to 14 days ago    2 to 3 weeks ago    3 to 4 weeks ago    More than a month ago   Do you know the exact date your symptoms started? If so, enter the date as MM/DD/YY. Select one.    Yes (specify): 12/17/23   Not selected:    No   Did your symptoms come on suddenly or gradually? Select one.    Suddenly   Not selected:    Gradually    I'm not sure   Since your current symptoms started, have you been tested for COVID-19? This includes home self-tests as well as nose swab or saliva tests done at a doctor's office, lab, or testing site. Select one.    Yes   Not selected:    No   When was your most recent COVID-19 test? Select one.    Within the last 24 hours   Not selected:    Within the last week (specify date as MM/DD/YY)    7 to 14 days ago    15 to 30 days ago    More than 1 month ago   What was the result of your most recent COVID-19 test? Select one.    Negative   Not selected:    Positive   Has anyone in your household tested positive for COVID-19 in the past 14 days? Select one.    No   Not selected:    Yes   In the last 14 days, have you had close contact with someone who has COVID-19? \"Close contact\" means any of these: - Caring for someone with COVID-19. - Being within 6 feet of someone with COVID-19 for a total of at least 15 minutes over a 24-hour period. For example, three 5-minute exposures for a total of 15 minutes. - Being in direct contact with respiratory droplets from someone with COVID-19 (being coughed on, kissing, sharing utensils). Select one.    No, not that I know of   Not selected:    Yes, a confirmed case    Yes, a suspected case   Have you gotten the [id number] updated COVID-19 vaccine? This " means either the updated Pfizer-BioNTech or Moderna vaccine after September 12, 2023; or the updated Novavax vaccine after October 3, 2023. Select one.    No   Not selected:    Yes   Since your symptoms started, have you felt dizzy? Select one.    No   Not selected:    Yes, but I can still do my regular daily activities    Yes, and it makes it hard to stand, walk, or do daily activities   Do you cough so hard that it's made you gag or vomit? By gag, we mean has your coughing made you choke or dry heave? Select all that apply.    No   Not selected:    Yes, my coughing has made me gag    Yes, my coughing has made me vomit   Does your cough come in spasms of 10 to 20 coughs in a row, followed by a loud whoop when breathing in? Select one.    No   Not selected:    Yes   When is your cough the worst? Select all that apply.    I haven't noticed a difference depending on time of day   Not selected:    In the morning, or when I wake up    During the day    At nighttime, or while I'm sleeping   Are you coughing up mucus or phlegm? Select one.    No, my cough is dry   Not selected:    Yes, a little    Yes, a lot   Do you feel sinus pain or pressure in any of these areas?    In my forehead    Around my eyes    Behind my nose   Not selected:    In my cheeks    In my upper teeth or jaw    No   When did you first notice your sinus pain or pressure? Select one.    Less than 5 days ago   Not selected:    5 to 9 days ago    10 to 14 days ago    2 to 4 weeks ago    1 month ago or longer   Does coughing, sneezing, or leaning forward make your sinuses feel worse? Select one.    Yes   Not selected:    No   What color is your nasal drainage? Select one.    Yellow or yellowish   Not selected:    Clear    White    Green or greenish    My nose is stuffed but not draining or running   Is your nasal drainage thick or thin? Select one.    Thin   Not selected:    Thick   Is there any drainage (mucus) going down the back of your throat? This  "kind of drainage is also called \"postnasal drip.\" It can cause frequent throat clearing. Select one.    Yes   Not selected:    No, not that I know of   Can you swallow liquids and solid foods? A sore throat may be painful when swallowing, but it shouldn't prevent you from swallowing. Select one.    Yes, with ease   Not selected:    Yes, but it's uncomfortable    Yes, but it's painful    It's hard to swallow anything because it feels like liquids and food get stuck in my throat    No, I can't swallow anything, liquid or solid foods   Is your throat pain worse on one side than the other? Select one.    No, it's the same on both sides   Not selected:    Yes, it's worse on the right side    Yes, it's worse on the left side   Do you have chest pain? You might also feel it as discomfort, aching, tightness, or squeezing in the chest. Select one.    No   Not selected:    Yes   Have you urinated at least 3 times in the last 24 hours? Select one.    Yes   Not selected:    No   Changes in alertness or awareness may mean you need emergency care. Since your symptoms started, have you had any of these? Select all that apply.    None of the above   Not selected:    Confusion    Slurred speech    Not knowing where you are or what day it is    Difficulty staying conscious    Fainting or passing out   Do your symptoms include a whistling sound, or wheezing, when you breathe? Select one.    No   Not selected:    Yes   Early in this interview, you told us you were hoarse or you'd lost your voice. How would you describe the changes to your voice? Select one.    It just sounds a little raspy   Not selected:    It's harder than usual to talk    I can barely talk at all   Is it possible that you strained your voice? Singing, yelling, or talking more or louder than usual can cause voice strain. Select one.    No, not that I know of   Not selected:    Yes   Do you have any of these symptoms in your ear(s)? Select all that apply.    Pain   "  Pressure    Fullness   Not selected:    Crackling or popping    Plugged or blocked sensation    None of the above   Are your tonsils larger than usual?    I've had my tonsils removed   Not selected:    Yes    No, not that I can tell   Are there red spots on the roof of your mouth or the back of your throat?    No, not that I can see   Not selected:    Yes   Are your glands/lymph nodes swollen, or does it hurt when you touch them?    Yes   Not selected:    No, not that I can tell   In the past week, has anyone around you (such as at school, work, or home) had a confirmed diagnosis of the flu? A confirmed diagnosis means that a nose swab was done to verify a flu infection. Select all that apply.    No, not that I know of   Not selected:    I live with someone who has the flu    I've been within touching distance of someone who has the flu    I've walked by, or sat about 3 feet away from, someone who has the flu    I've been in the same building as someone who has the flu   Have you ever been diagnosed with asthma? Select one.    No   Not selected:    Yes   Have you ever been prescribed albuterol to use for wheezing, cough, or shortness of breath caused by a cold, bronchitis, or pneumonia? Albuterol (ProAir, Proventil, Ventolin) is prescribed as an inhaler or a solution to be used with a nebulizer machine. Select one.    No, not that I know of   Not selected:    Yes   Have you ever been prescribed a steroid inhaler to use for wheezing, cough, or shortness of breath caused by a cold, bronchitis, or pneumonia? Some examples of steroid inhalers include Pulmicort, Flovent, Qvar, and Alvesco. Select one.    No, not that I know of   Not selected:    Yes   Have you ever been diagnosed with chronic obstructive pulmonary disease (COPD)? Select one.    No, not that I know of   Not selected:    Yes   In the past month, have you taken antibiotics for similar symptoms? Examples of antibiotics include amoxicillin,  amoxicillin-clavulanate (Augmentin), penicillin, cefdinir (Omnicef), doxycycline, and clindamycin (Cleocin). Select one.    Yes (specify): patient did not specify   Not selected:    No   In the last year, how many times were you treated with antibiotics for a sinus infection? Select one.    None   Not selected:    1 to 3 times    4 or more times   Have you been diagnosed with a deviated septum or nasal polyps? The nose is divided into two nostrils by the septum. A crooked septum is called a deviated septum. Nasal polyps are growths inside the nose or sinuses. Select one.    No, not that I know of   Not selected:    Yes, but I had surgery to treat them    Yes, I have a deviated septum    Yes, I have nasal polyps    Yes, I have a deviated septum and nasal polyps   Do you have a sore inside your nose that won't heal? Select one.    No, not that I know of   Not selected:    Yes   Do you have allergies (pollen, dust mites, mold, animal dander)? Select one.    No, not that I know of   Not selected:    Yes   Have you had a flu shot this season? Select one.    No   Not selected:    Yes, less than 2 weeks ago    Yes, 2 to 4 weeks ago    Yes, 1 to 3 months ago    Yes, 3 to 6 months ago    Yes, more than 6 months ago   Have you gone through menopause? Select one.    Yes   Not selected:    No    I'm going through it now   The flu and COVID-19 can be more serious for people in certain groups. The next few questions help us figure out if you or anyone you live with is at higher risk for complications from these infections. Do any of these statements apply to you? Select all that apply.    None of the above   Not selected:    I'm     I'm     I'm Black    I'm  or    Do you smoke tobacco? Select one.    No   Not selected:    Yes, every day    Yes, some days    No, I quit   Do you have a mostly inactive lifestyle? Answer yes if all of these are true: - You spend at least 6 hours a day sitting  or lying down - You get less than 2 and a half hours per week of moderate exercise such as walking fast - You get less than 1 hour and 15 minutes per week of intense exercise such as jogging or running Select one.    No   Not selected:    Yes   Do you have any of these conditions? Select all that apply.    Heart disease, such as congenital heart disease, congestive heart failure, or coronary artery disease   Not selected:    Chronic lung disease, such as cystic fibrosis or interstitial fibrosis    Disorder of the brain, spinal cord, or nerves and muscles, such as dementia, cerebral palsy, epilepsy, muscular dystrophy, or developmental delay    Metabolic disorder or mitochondrial disease    Cerebrovascular disease, such as stroke or another condition affecting the blood vessels or blood supply to the brain    Down syndrome    Mood disorder, including depression or schizophrenia spectrum disorders    Substance use disorder, such as alcohol, opioid, or cocaine use disorder    Tuberculosis    Primary immunodeficiency    None of the above   Do you live in a group care setting? Examples include: - Nursing home - Residential care - Psychiatric treatment facility - [facility] home - [facility] - [facility] and [facility] - Homeless shelter - Foster care setting Select one.    No   Not selected:    Yes   Are you a healthcare worker? Select one.    No   Not selected:    Yes   People with a very high body mass index (BMI) are at higher risk for developing complications from the flu and severe illness from COVID-19. To determine your BMI, we need to know your weight and height. Please enter your weight (in pounds).    Weight   Please enter your height.    Height   Do you have any of these conditions that can affect the immune system? Scroll to see all options. Select all that apply.    None of these   Not selected:    History of bone marrow transplant    Chronic kidney disease    Chronic liver disease (including cirrhosis)    HIV/AIDS     Inflammatory bowel disease (Crohn's disease or ulcerative colitis)    Lupus    Moderate to severe plaque psoriasis    Multiple sclerosis    Rheumatoid arthritis    Sickle cell anemia    Alpha or beta thalassemia    History of solid organ transplant (kidney, liver, or heart)    History of spleen removal    An autoimmune disorder not listed here (specify)    A condition requiring treatment with long-term use of oral steroids (such as prednisone, prednisolone, or dexamethasone) (specify)   Have you ever been diagnosed with cancer? Select one.    No   Not selected:    Yes, I have cancer now    Yes, but I'm in remission   Do any of these apply to you? Select all that apply.    None of the above   Not selected:    I've been hospitalized within the last 5 days    I have diabetes    I'm in close contact with a child in    The flu and COVID-19 can be more serious for people in certain groups. Do any of these apply to the people who live with you? Select all that apply.    None of the above   Not selected:    Under age 5    Over age 65            Black     or     Pregnant    Has given birth, had a miscarriage, had a pregnancy loss, or had an  in the last 2 weeks   Does any member of your household have any of these medical conditions? Select all that apply.    None of the above   Not selected:    Asthma    Disorders of the brain, spinal cord, or nerves and muscles, such as dementia, cerebral palsy, epilepsy, muscular dystrophy, or developmental delay    Chronic lung disease, such as COPD or cystic fibrosis    Heart disease, such as congenital heart disease, congestive heart failure, or coronary artery disease    Cerebrovascular disease, such as stroke or another condition affecting the blood vessels or blood supply to the brain    Blood disorders, such as sickle cell disease    Diabetes    Metabolic disorders such as inherited metabolic disorders or mitochondrial  disease    Kidney disorders    Liver disorders    Weakened immune system due to illness or medications such as chemotherapy or steroids    Children under the age of 19 who are on long-term aspirin therapy    Extreme obesity (BMI > 40)   Do you have any of these conditions? Scroll to see all options. Select all that apply.    High blood pressure   Not selected:    Aspirin triad (also known as Samter's triad or ASA triad)    Asthma or hives from taking aspirin or other NSAIDs, such as ibuprofen or naproxen    Blockage or narrowing of the blood vessels of the heart    Blood clotting disorder    Blood dyscrasia, such anemia, leukemia, lymphoma, or myeloma    Bone marrow depression    Catecholamine-releasing paraganglioma    Congenital long QT syndrome    Depression    Difficulty urinating or completely emptying your bladder    Uncorrected electrolyte abnormalities    Fungal infection    Gastrointestinal (GI) bleeding    Gastrointestinal (GI) obstruction    G6PD deficiency    Recent heart attack    Irregular heartbeat or heart rhythm    Mononucleosis (mono)    Myasthenia gravis    Parkinson's disease    Pheochromocytoma    Reye syndrome    Seizure disorder    Thyroid disease    Ulcerative colitis    None of the above   Have you ever had either of these conditions? Select all that apply.    No   Not selected:    Metoclopramide-associated dystonic reaction    Tardive dyskinesia   Just a few more questions about medications, and then you're finished. Have you used any non-prescription medications or nasal sprays for your current symptoms? Examples include saline sprays, decongestants, NyQuil, and Tylenol. Select one.    No   Not selected:    Yes   Have you taken any monoamine oxidase inhibitor (MAOI) medications in the last 14 days? Examples include rasagiline (Azilect), selegiline (Eldepryl, Zelapar), isocarboxazid (Marplan), phenelzine (Nardil), and tranylcypromine (Parnate). Select one.    No, not that I know of   Not  "selected:    Yes   Do you take Kynmobi or Apokyn (apomorphine)? Select one.    No   Not selected:    Yes   Are you still taking these medications listed in your medical record? If you're not taking any of these, click Next. Select all that apply.    ezetimibe-simvastatin 10-40 MG per tablet    carvedilol 6.25 MG tablet    pantoprazole 40 MG EC tablet    estradiol 0.1 MG/24HR patch    Wegovy 2.4 MG/0.75ML solution auto-injector    nitroglycerin 0.4 MG SL tablet    clopidogrel 75 MG tablet    isosorbide mononitrate 30 MG 24 hr tablet    buPROPion  MG 24 hr tablet    aspirin 81 MG chewable tablet   Are you taking any other medications, vitamins, or supplements? Select one.    No   Not selected:    Yes   Have you ever had an allergic or bad reaction to any medication? Select one.    Yes   Not selected:    No   Have you had an allergic or bad reaction to any of these medications? Select all that apply.    No, not that I know of   Not selected:    Baloxavir (Xofluza)    Benzonatate (Tessalon Perles)    Fluconazole, itraconazole, or terconazole (brands include Diflucan, Sporanox, Terazol)    Oseltamivir (Tamiflu) or zanamivir (Relenza)    Paxlovid, nirmatrelvir, or ritonavir (Norvir)   Have you had an allergic or bad reaction to any of these antibiotic medications? Select all that apply.    Penicillin or any \"-cillin\" antibiotic, such as amoxicillin, ampicillin, dicloxacillin, nafcillin, or piperacillin (Brands include Augmentin, Unasyn, and Zosyn)   Not selected:    Tetracycline or any \"-cycline\" antibiotic, such as doxycycline, demeclocycline, minocycline (Brands include Declomycin, Doryx, Dynacin, Oracea, Monodox, Panmycin, and Vibramycin)    Ciprofloxacin or any \"-floxacin\" antibiotic, such as gemifloxacin, levofloxacin, moxifloxacin, or ofloxacin (Brands include Factive, Cipro, Floxin, and Levaquin)    Cephalexin or any \"cef-\" antibiotic, such as cefazolin, cefdinir, cefuroxime, ceftriaxone, ceftazidime, or " "cefepime (Brands include Ancef, Ceftin, Fortaz, Keflex, Maxipime, Rocephin, and Simplicef)    Azithromycin or any \"-thromycin\" antibiotic, such as erythromycin or clarithromycin (Brands include Biaxin, Erythrocin, Z-brian, and Zithromax)    Clindamycin or lincomycin (Brands include Cleocin and Lincocin)    No, not that I know of   When you had an allergic or bad reaction to penicillin or another \"-cillin\" antibiotic, did you have any of these symptoms? Select all that apply.    None of the above   Not selected:    Raised, red, itchy spots with each spot lasting less than 24 hours    Swelling of the mouth, eyes, lips, or tongue    Blisters or ulcers on the lips, mouth, eyes, or vagina    _Peeling skin _    Breathing difficulties    Feeling light-headed or faint    A fast heartbeat    Clammy skin    Confusion and anxiety    Collapsing or losing consciousness    Joint pains    Problems with kidneys, lungs, or liver   Have you had an allergic or bad reaction to any of these medications? Select all that apply.    No, not that I know of   Not selected:    Albuterol or a similar medication    Atropine    Corticosteroid (steroid) medication, including topical steroids, inhaled steroids, nasal steroids, or oral steroids (budesonide, ciclesonide, dexamethasone, flunisolide, fluticasone, methylprednisolone, triamcinolone, prednisone (or brand names Alvesco, Deltasone, Flovent, Medrol, Nasacort, Rhinocort, or Veramyst)    Metoclopramide (Reglan)    Ondansetron (Zuplenz, Zofran ODT, Zofran)    Prochlorperazine (Compazine)   Have you had an allergic or bad reaction to any of these eye drops, nasal sprays, or inhalers? Scroll to see all options. Select all that apply.    No, not that I know of   Not selected:    Azelastine (Astelin, Astepro, Optivar)    Cromolyn (Crolom, NasalCrom)    Ipratropium (Atrovent)    Ketotifen (Alaway, Zaditor)    Pheniramine/naphazoline (Naphcon-A, Opcon-A, Visine-A)    Olopatadine (Pataday, Patanol, " Pazeo)   Have you had an allergic or bad reaction to any of these non-prescription medications? Scroll to see all options. Select all that apply.    No, not that I know of   Not selected:    Acetaminophen (Tylenol)    Aspirin    Cetirizine (Zyrtec)    Dextromethorphan (Delsym, Robitussin, Vicks DayQuil Cough)    Diphenhydramine (Benadryl)    Fexofenadine (Allegra)    Guaifenesin (Mucinex)    Dextromethorphan (Delsym)    Ibuprofen (Advil, Motrin, Midol)    Loratadine (Alavert, Claritin)    Oxymetazoline (Afrin)    Phenylephrine (Sudafed PE)    Pseudoephedrine (Sudafed)   Are you allergic to milk or to the proteins found in milk (for example, whey or casein)? A milk allergy is different from lactose intolerance. Select one.    No, not that I know of   Not selected:    Yes   Have you ever had jaundice or liver problems as a result of taking azithromycin (Zithromax, Zmax)? Jaundice is a condition in which the skin and the whites of the eyes turn yellow. Select all that apply.    No, not that I know of   Not selected:    Yes, jaundice    Yes, liver problems   Do you need a doctor's note? A doctor's note confirms that you received care today and states when you can return to school or work. It does not contain information about your diagnosis or treatment plan. Your provider will make the final decision on whether to give you a doctor's note and for how long. Doctor's notes CANNOT be backdated. We can't provide medical leave paperwork through this type of visit. If more paperwork is needed to request time off, contact your primary care provider. Select one.    No   Not selected:    Today only (1 day)    Today and tomorrow (2 days)    3 days    5 days    7 days   Is there anything you'd like to add about your symptoms? Please limit your comments to the symptoms asked about in this interview. If you include comments about other concerns, your provider may recommend that you be seen in person.   The patient did not enter any  additional information.   ----------   Medical history   Medical history data does not currently exist for this patient.

## 2024-02-21 ENCOUNTER — OFFICE VISIT (OUTPATIENT)
Dept: FAMILY MEDICINE CLINIC | Facility: CLINIC | Age: 64
End: 2024-02-21
Payer: COMMERCIAL

## 2024-02-21 VITALS
HEIGHT: 67 IN | WEIGHT: 175 LBS | TEMPERATURE: 98 F | HEART RATE: 74 BPM | BODY MASS INDEX: 27.47 KG/M2 | SYSTOLIC BLOOD PRESSURE: 106 MMHG | OXYGEN SATURATION: 96 % | RESPIRATION RATE: 18 BRPM | DIASTOLIC BLOOD PRESSURE: 71 MMHG

## 2024-02-21 DIAGNOSIS — E66.3 OVERWEIGHT (BMI 25.0-29.9): Primary | Chronic | ICD-10-CM

## 2024-02-21 DIAGNOSIS — J01.10 ACUTE NON-RECURRENT FRONTAL SINUSITIS: ICD-10-CM

## 2024-02-21 DIAGNOSIS — Z76.89 ENCOUNTER FOR WEIGHT MANAGEMENT: ICD-10-CM

## 2024-02-21 DIAGNOSIS — E78.2 MIXED HYPERLIPIDEMIA: Chronic | ICD-10-CM

## 2024-02-21 RX ORDER — CEFDINIR 300 MG/1
300 CAPSULE ORAL 2 TIMES DAILY
Qty: 14 CAPSULE | Refills: 0 | Status: SHIPPED | OUTPATIENT
Start: 2024-02-21

## 2024-02-21 RX ORDER — SEMAGLUTIDE 2.4 MG/.75ML
1 INJECTION, SOLUTION SUBCUTANEOUS WEEKLY
Qty: 3 ML | Refills: 2 | Status: SHIPPED | OUTPATIENT
Start: 2024-02-21

## 2024-02-21 NOTE — PROGRESS NOTES
"Chief Complaint  Obesity (Has been losing weight with new injection.) and Facial Pain (Sinus congestion x2 weeks, having tooth pain.)    Subjective          Viviana Hodges presents to [facility]  Obesity    Facial Pain      Patient presents to follow-up on obesity is improving her weight loss with medication and will continue to titrate as tolerable and has continues to improve on weight overall BMI 27 her last which she is currently right at    Having complaints of sinus pain pressure bilaterally for a week or 2 additionally with tooth pain        Objective   Vital Signs:   /71 (BP Location: Right arm, Patient Position: Sitting, Cuff Size: Adult)   Pulse 74   Temp 98 °F (36.7 °C) (Temporal)   Resp 18   Ht 170.2 cm (67\")   Wt 79.4 kg (175 lb)   SpO2 96%   BMI 27.41 kg/m²            Physical Exam  Vitals reviewed.   Constitutional:       Appearance: Normal appearance. She is well-developed.   HENT:      Head: Normocephalic and atraumatic.      Right Ear: External ear normal.      Left Ear: External ear normal.      Nose: Nose normal.      Right Sinus: Frontal sinus tenderness present.      Left Sinus: Frontal sinus tenderness present.   Eyes:      Conjunctiva/sclera: Conjunctivae normal.      Pupils: Pupils are equal, round, and reactive to light.   Cardiovascular:      Rate and Rhythm: Normal rate.   Pulmonary:      Effort: Pulmonary effort is normal.      Breath sounds: Normal breath sounds.   Abdominal:      General: There is no distension.   Skin:     General: Skin is warm and dry.   Neurological:      Mental Status: She is alert and oriented to person, place, and time. Mental status is at baseline.   Psychiatric:         Mood and Affect: Mood and affect normal.         Behavior: Behavior normal.         Thought Content: Thought content normal.         Judgment: Judgment normal.          Result Review :   The following data was reviewed by: Delfin Bhandari DO " on 02/21/2024:  Common labs          4/27/2023    12:05 10/11/2023    08:50   Common Labs   Glucose  84    BUN  9    Creatinine  0.78    Sodium  140    Potassium  4.2    Chloride  106    Calcium  9.1    Albumin  4.1    Total Bilirubin  0.4    Alkaline Phosphatase  66    AST (SGOT)  27    ALT (SGPT)  30    WBC 7.60  7.54    Hemoglobin 13.9  14.7    Hematocrit 41.6  42.9    Platelets 270  282    Total Cholesterol 142  140    Triglycerides 92  82    HDL Cholesterol 54  62    LDL Cholesterol  71  62    Microalbumin, Urine  <1.2                    Assessment and Plan    Diagnoses and all orders for this visit:    1. Overweight (BMI 25.0-29.9) (Primary)  -     Semaglutide-Weight Management (Wegovy) 2.4 MG/0.75ML solution auto-injector; Inject 1 each as directed 1 (One) Time Per Week.  Dispense: 3 mL; Refill: 2    2. Mixed hyperlipidemia  -     Semaglutide-Weight Management (Wegovy) 2.4 MG/0.75ML solution auto-injector; Inject 1 each as directed 1 (One) Time Per Week.  Dispense: 3 mL; Refill: 2    3. Acute non-recurrent frontal sinusitis    4. Encounter for weight management    Other orders  -     cefdinir (OMNICEF) 300 MG capsule; Take 1 capsule by mouth 2 (Two) Times a Day.  Dispense: 14 capsule; Refill: 0      Continue medicine as prescribed for weight loss goal BMI under 27 she is right at her close to goal currently titrate up as appropriate on medication weekly injection    Antibiotics prescribed for sinusitis symptoms follow-up with no improvement and see back at least yearly for physical and every 3 months or so for weight loss if continues to have success sooner if concerns or issues      Follow Up   Return in about 6 months (around 8/21/2024), or if symptoms worsen or fail to improve, for Next scheduled follow up.  Patient was given instructions and counseling regarding her condition or for health maintenance advice. Please see specific information pulled into the AVS if appropriate.     Transcribed from ambient  dictation for Delfin Bhandari DO by Delfin Bhandari DO.  02/21/24   09:32 EST

## 2024-03-18 DIAGNOSIS — E78.2 MIXED HYPERLIPIDEMIA: Chronic | ICD-10-CM

## 2024-03-18 DIAGNOSIS — E66.09 CLASS 1 OBESITY DUE TO EXCESS CALORIES WITH SERIOUS COMORBIDITY AND BODY MASS INDEX (BMI) OF 31.0 TO 31.9 IN ADULT: Chronic | ICD-10-CM

## 2024-03-18 RX ORDER — ONDANSETRON 8 MG/1
TABLET, ORALLY DISINTEGRATING ORAL
Qty: 15 TABLET | Refills: 0 | Status: SHIPPED | OUTPATIENT
Start: 2024-03-18

## 2024-04-22 DIAGNOSIS — E66.09 CLASS 1 OBESITY DUE TO EXCESS CALORIES WITH SERIOUS COMORBIDITY AND BODY MASS INDEX (BMI) OF 31.0 TO 31.9 IN ADULT: Chronic | ICD-10-CM

## 2024-04-22 DIAGNOSIS — E78.2 MIXED HYPERLIPIDEMIA: Chronic | ICD-10-CM

## 2024-04-22 RX ORDER — ONDANSETRON 8 MG/1
TABLET, ORALLY DISINTEGRATING ORAL
Qty: 15 TABLET | Refills: 0 | Status: SHIPPED | OUTPATIENT
Start: 2024-04-22

## 2024-04-29 DIAGNOSIS — K21.9 CHRONIC GERD: ICD-10-CM

## 2024-04-29 RX ORDER — PANTOPRAZOLE SODIUM 40 MG/1
TABLET, DELAYED RELEASE ORAL
Qty: 180 TABLET | Refills: 3 | Status: SHIPPED | OUTPATIENT
Start: 2024-04-29

## 2024-07-24 DIAGNOSIS — E66.3 OVERWEIGHT (BMI 25.0-29.9): Chronic | ICD-10-CM

## 2024-07-24 DIAGNOSIS — E78.2 MIXED HYPERLIPIDEMIA: Chronic | ICD-10-CM

## 2024-07-24 DIAGNOSIS — E66.09 CLASS 1 OBESITY DUE TO EXCESS CALORIES WITH SERIOUS COMORBIDITY AND BODY MASS INDEX (BMI) OF 31.0 TO 31.9 IN ADULT: Chronic | ICD-10-CM

## 2024-07-24 RX ORDER — ONDANSETRON 8 MG/1
TABLET, ORALLY DISINTEGRATING ORAL
Qty: 15 TABLET | Refills: 0 | Status: SHIPPED | OUTPATIENT
Start: 2024-07-24

## 2024-07-24 RX ORDER — SEMAGLUTIDE 2.4 MG/.75ML
INJECTION, SOLUTION SUBCUTANEOUS
Qty: 3 ML | Refills: 1 | Status: SHIPPED | OUTPATIENT
Start: 2024-07-24

## 2024-07-31 ENCOUNTER — OFFICE VISIT (OUTPATIENT)
Dept: FAMILY MEDICINE CLINIC | Facility: CLINIC | Age: 64
End: 2024-07-31
Payer: COMMERCIAL

## 2024-07-31 VITALS
SYSTOLIC BLOOD PRESSURE: 137 MMHG | RESPIRATION RATE: 18 BRPM | TEMPERATURE: 97 F | OXYGEN SATURATION: 99 % | HEART RATE: 80 BPM | HEIGHT: 67 IN | DIASTOLIC BLOOD PRESSURE: 69 MMHG | BODY MASS INDEX: 26.21 KG/M2 | WEIGHT: 167 LBS

## 2024-07-31 DIAGNOSIS — R10.11 RIGHT UPPER QUADRANT PAIN: Primary | ICD-10-CM

## 2024-07-31 DIAGNOSIS — Z12.31 ENCOUNTER FOR SCREENING MAMMOGRAM FOR MALIGNANT NEOPLASM OF BREAST: ICD-10-CM

## 2024-07-31 DIAGNOSIS — E78.2 MIXED HYPERLIPIDEMIA: ICD-10-CM

## 2024-07-31 DIAGNOSIS — I10 PRIMARY HYPERTENSION: Chronic | ICD-10-CM

## 2024-07-31 PROCEDURE — 99214 OFFICE O/P EST MOD 30 MIN: CPT

## 2024-07-31 NOTE — PROGRESS NOTES
Chief Complaint   Patient presents with    GI Problem     X3 months       Subjective          Viviana Hodges presents to [facility]    History of Present Illness  Viviana is here to be seen for GI problems. She wakes up with diarrhea, she has right upper quadrant pain and gurgling with eating that radiates across the top of her abdomen. She has had a gastric bypass surgery and is currently on wegovy. I did make her aware that we might need to stop the wegovy with the issues she has going on but we will get a CT to see if there is anything unrelated to the wegovy happening with her gallbladder or liver. She also needs a new gastro doctor as hers doesn't take her insurance anymore.       Past History:  Medical History: has a past medical history of Allergic, Angina at rest, Anxiety, Bleeding ulcer, Bursitis of left shoulder (2018), CAD (coronary artery disease) (2015), Chronic allergic rhinitis, DDD (degenerative disc disease), lumbar (2016), GERD (gastroesophageal reflux disease) (2016), History of coronary artery stent placement, HLD (hyperlipidemia), HTN (hypertension), IBS (irritable bowel syndrome) (2016), MDD (major depressive disorder), Nondisplaced fracture of proximal end of right fibula (2017), OA (osteoarthritis), Obesity (BMI 30-39.9), and Seasonal allergies.   Surgical History: has a past surgical history that includes Coronary angioplasty with stent;  section (); Colonoscopy; Eye surgery; Cardiac surgery; Hysterectomy (); Laparoscopic gastric banding (); Tonsillectomy; Esophagogastroduodenoscopy (N/A, 2022); Colonoscopy (N/A, 2022); Esophagogastroduodenoscopy (N/A, 2022); and Squamous cell carcinoma excision (Right, 2023).   Family History: family history includes Arthritis in her father and mother; Diabetes in her father, mother, and another family member; Heart disease in her  "father, mother, and another family member; Osteoporosis in her mother; Stroke in her sister.   Social History: reports that she quit smoking about 17 years ago. Her smoking use included cigarettes. She started smoking about 42 years ago. She has a 12.5 pack-year smoking history. She has never used smokeless tobacco. She reports current alcohol use. She reports that she does not use drugs.  Allergies: Shellfish allergy, Shellfish-derived products, and Penicillins  (Not in a hospital admission)       Social History     Socioeconomic History    Marital status:    Tobacco Use    Smoking status: Former     Current packs/day: 0.00     Average packs/day: 0.5 packs/day for 25.0 years (12.5 ttl pk-yrs)     Types: Cigarettes     Start date:      Quit date:      Years since quittin.5    Smokeless tobacco: Never    Tobacco comments:     QUIT 15 YEARS AGO   Vaping Use    Vaping status: Never Used   Substance and Sexual Activity    Alcohol use: Yes     Comment: Occ; has been drinking for 31 or more years    Drug use: Never    Sexual activity: Defer       Health Maintenance Due   Topic Date Due    ZOSTER VACCINE (1 of 2) Never done    COVID-19 Vaccine (2023- season) Never done    GASTROSCOPY (EGD)  2023    ANNUAL PHYSICAL  2024    MAMMOGRAM  2024       Objective     Vital Signs:   /69 (BP Location: Left arm, Patient Position: Sitting, Cuff Size: Adult)   Pulse 80   Temp 97 °F (36.1 °C) (Temporal)   Resp 18   Ht 170.2 cm (67\")   Wt 75.8 kg (167 lb)   SpO2 99%   BMI 26.16 kg/m²       Physical Exam  Constitutional:       Appearance: Normal appearance.   HENT:      Nose: Nose normal.      Mouth/Throat:      Mouth: Mucous membranes are moist.   Cardiovascular:      Rate and Rhythm: Normal rate and regular rhythm.      Pulses: Normal pulses.      Heart sounds: Normal heart sounds.   Pulmonary:      Effort: Pulmonary effort is normal.      Breath sounds: Normal breath sounds. "   Skin:     General: Skin is warm and dry.   Neurological:      General: No focal deficit present.      Mental Status: She is alert and oriented to person, place, and time.   Psychiatric:         Mood and Affect: Mood normal.         Behavior: Behavior normal.          Review of Systems   All other systems reviewed and are negative.       Result Review :                 Assessment and Plan    Diagnoses and all orders for this visit:    1. Right upper quadrant pain (Primary)  -     CT Abdomen Pelvis Without Contrast; Future  -     Ambulatory Referral to Gastroenterology    2. Encounter for screening mammogram for malignant neoplasm of breast  -     Mammo Screening Bilateral With CAD; Future    3. Primary hypertension  Assessment & Plan:  Hypertension is stable and controlled  Continue current treatment regimen.  Blood pressure will be reassessed in 6 months.      4. Mixed hyperlipidemia  Assessment & Plan:   Lipid abnormalities are stable    Plan:  Continue same medication/s without change.      Discussed medication dosage, use, side effects, and goals of treatment in detail.    Counseled patient on lifestyle modifications to help control hyperlipidemia.     Patient Treatment Goals:   LDL goal is less than 70    Followup in 6 months.            Pt thought to be clinically stable at this time.    Follow Up   Return if symptoms worsen or fail to improve.  Patient was given instructions and counseling regarding her condition or for health maintenance advice. Please see specific information pulled into the AVS if appropriate.       Answers submitted by the patient for this visit:  Other (Submitted on 7/31/2024)  Please describe your symptoms.: Gallbladder  Have you had these symptoms before?: No  How long have you been having these symptoms?: Greater than 2 weeks  Primary Reason for Visit (Submitted on 7/31/2024)  What is the primary reason for your visit?: Other

## 2024-08-01 ENCOUNTER — PATIENT ROUNDING (BHMG ONLY) (OUTPATIENT)
Dept: FAMILY MEDICINE CLINIC | Facility: CLINIC | Age: 64
End: 2024-08-01
Payer: COMMERCIAL

## 2024-08-03 ENCOUNTER — TRANSCRIBE ORDERS (OUTPATIENT)
Dept: ADMINISTRATIVE | Facility: HOSPITAL | Age: 64
End: 2024-08-03
Payer: COMMERCIAL

## 2024-08-03 DIAGNOSIS — Z12.31 SCREENING MAMMOGRAM FOR BREAST CANCER: Primary | ICD-10-CM

## 2024-08-07 ENCOUNTER — HOSPITAL ENCOUNTER (OUTPATIENT)
Dept: CT IMAGING | Facility: HOSPITAL | Age: 64
Discharge: HOME | End: 2024-08-07
Payer: COMMERCIAL

## 2024-08-07 DIAGNOSIS — R10.11 RIGHT UPPER QUADRANT PAIN: ICD-10-CM

## 2024-08-07 PROCEDURE — 74176 CT ABD & PELVIS W/O CONTRAST: CPT

## 2024-08-12 ENCOUNTER — OFFICE VISIT (OUTPATIENT)
Dept: GASTROENTEROLOGY | Facility: CLINIC | Age: 64
End: 2024-08-12
Payer: COMMERCIAL

## 2024-08-12 VITALS
HEART RATE: 71 BPM | SYSTOLIC BLOOD PRESSURE: 126 MMHG | DIASTOLIC BLOOD PRESSURE: 84 MMHG | BODY MASS INDEX: 26.24 KG/M2 | HEIGHT: 67 IN | WEIGHT: 167.2 LBS

## 2024-08-12 DIAGNOSIS — K31.A0 INTESTINAL METAPLASIA OF GASTRIC MUCOSA: ICD-10-CM

## 2024-08-12 DIAGNOSIS — R14.3 FLATULENCE: ICD-10-CM

## 2024-08-12 DIAGNOSIS — R19.7 DIARRHEA, UNSPECIFIED TYPE: ICD-10-CM

## 2024-08-12 DIAGNOSIS — Z98.84 HISTORY OF GASTRIC BYPASS: ICD-10-CM

## 2024-08-12 DIAGNOSIS — R10.11 RIGHT UPPER QUADRANT ABDOMINAL PAIN: Primary | ICD-10-CM

## 2024-08-12 PROCEDURE — 99214 OFFICE O/P EST MOD 30 MIN: CPT | Performed by: NURSE PRACTITIONER

## 2024-08-12 NOTE — PROGRESS NOTES
Patient Name: Viviana Hodges   Visit Date: 08/12/2024   Patient ID: [id number]  Provider: TRUONG Borden    Sex: female  Location:  Location Address:  Location Phone: [phone]  [place] [address] [phone]    YOB: 1960  Age: 64 y.o.   Primary Care Provider Delfin Bhandari DO      Referring Provider: TRUONG Tyler        Chief Complaint  Abdominal Pain (RUQ ABD pain daily after meals ), Nausea, and Diarrhea (Pt states having 2-5 loose/liquid stools per day )    History of Present Illness  Patient initially seen by Dr. Cabrera when admitted in 2022 with abdominal pain and rectal bleeding.  Colonoscopy 3/2/2022: Good prep, multiple diverticula in the sigmoid, no specimens, repeat in 5 years  EGD 3/1/2022: Normal esophagus, gastric bypass with gastrojejunal anastomosis with erosion-biopsy with chronic active gastritis and focal intestinal metaplasia  Repeat EGD 9/14/2022: Normal esophagus, evidence of gastric bypass, gastrojejunal anastomosis with healthy appearing, normal jejunum    Pt states she has RUQ abd pain after meals and diarrhea x several months, feels progressively worsening. States lots of bubbling, frequent gas w foul odor. Averaging at least 3 stools/d, loose, small amounts, can be watery. Usually no nocturnal stools.   Occasionally no NSAIDs, drinks wine on weekends.  Pt states she's been on PPI for several years    CT abd pelvis w/o contrast 8/7/24 negative    Past Medical History:   Diagnosis Date    Allergic     Angina at rest     Anxiety     Bleeding ulcer     Bursitis of left shoulder 03/26/2018    CAD (coronary artery disease) 04/20/2015    Chronic allergic rhinitis     DDD (degenerative disc disease), lumbar 02/22/2016    GERD (gastroesophageal reflux disease) 02/22/2016    History of coronary artery stent placement     HLD (hyperlipidemia)     HTN (hypertension)     IBS (irritable bowel syndrome) 02/22/2016    MDD (major depressive disorder)      Nondisplaced fracture of proximal end of right fibula 2017    OA (osteoarthritis)     Obesity (BMI 30-39.9)     Seasonal allergies        Past Surgical History:   Procedure Laterality Date    CARDIAC SURGERY       SECTION      COLONOSCOPY      COLONOSCOPY N/A 2022    Procedure: COLONOSCOPY;  Surgeon: Santana Cabrera MD;  Location: [facility] ENDOSCOPY;  Service: Gastroenterology;  Laterality: N/A;  diverticulosis    CORONARY ANGIOPLASTY WITH STENT PLACEMENT      ENDOSCOPY N/A 2022    Procedure: ESOPHAGOGASTRODUODENOSCOPY WITH BIOPSY;  Surgeon: Santana Cabrera MD;  Location: [facility] ENDOSCOPY;  Service: Gastroenterology;  Laterality: N/A;  PREVIOUS SURGERY/GASTRIC EROSIONS    ENDOSCOPY N/A 2022    Procedure: ESOPHAGOGASTRODUODENOSCOPY;  Surgeon: Santana Cabrera MD;  Location: [facility] ENDOSCOPY;  Service: Gastroenterology;  Laterality: N/A;  PREVIOUS SURGERY    EYE SURGERY      Implant     GASTRIC BYPASS      HYSTERECTOMY  2009    LAPAROSCOPIC GASTRIC BANDING  2008    SQUAMOUS CELL CARCINOMA EXCISION Right 2023    Right thumb    TONSILLECTOMY         Allergies   Allergen Reactions    Shellfish Allergy Anaphylaxis    Shellfish-Derived Products Anaphylaxis     Throat swelling    Penicillins Unknown - High Severity     As baby          Family History   Problem Relation Age of Onset    Heart disease Mother     Diabetes Mother     Arthritis Mother     Osteoporosis Mother     Heart disease Father     Diabetes Father     Arthritis Father     Stroke Sister     Heart disease Other     Diabetes Other     Colon cancer Neg Hx         Social History     Tobacco Use    Smoking status: Former     Current packs/day: 0.00     Average packs/day: 0.5 packs/day for 25.0 years (12.5 ttl pk-yrs)     Types: Cigarettes     Start date:      Quit date:      Years since quittin.6    Smokeless tobacco: Never    Tobacco comments:     QUIT 15 YEARS AGO   Vaping Use    Vaping status:  "Never Used   Substance Use Topics    Alcohol use: Yes     Alcohol/week: 2.0 standard drinks of alcohol     Types: 2 Glasses of wine per week     Comment: Occ; has been drinking for 31 or more years    Drug use: Never       Objective     Vital Signs:   /84 (BP Location: Left arm, Patient Position: Sitting, Cuff Size: Adult)   Pulse 71   Ht 170.2 cm (67\")   Wt 75.8 kg (167 lb 3.2 oz)   BMI 26.19 kg/m²       Physical Exam  Constitutional:       General: The patient is not in acute distress.     Appearance: Normal appearance.   HENT:      Head: Normocephalic and atraumatic.      Nose: Nose normal.   Pulmonary:      Effort: Pulmonary effort is normal. No respiratory distress.   Abdominal:      General: Abdomen is flat.      Palpations: Abdomen is soft. There is no mass.      Tenderness: There is no abdominal tenderness. There is no guarding.   Musculoskeletal:      Cervical back: Neck supple.      Right lower leg: No edema.      Left lower leg: No edema.   Skin:     General: Skin is warm and dry.   Neurological:      General: No focal deficit present.      Mental Status: The patient is alert and oriented to person, place, and time.      Gait: Gait normal.   Psychiatric:         Mood and Affect: Mood normal.         Speech: Speech normal.         Behavior: Behavior normal.         Thought Content: Thought content normal.     Result Review :   The following data was reviewed by: TRUONG Borden on 08/12/2024:    CBC w/diff          10/11/2023    08:50   CBC w/Diff   WBC 7.54    RBC 4.61    Hemoglobin 14.7    Hematocrit 42.9    MCV 93.1    MCH 31.9    MCHC 34.3    RDW 12.1    Platelets 282      CMP          10/11/2023    08:50   CMP   Glucose 84    BUN 9    Creatinine 0.78    EGFR 86.5    Sodium 140    Potassium 4.2    Chloride 106    Calcium 9.1    Total Protein 6.5    Albumin 4.1    Globulin 2.4    Total Bilirubin 0.4    Alkaline Phosphatase 66    AST (SGOT) 27    ALT (SGPT) 30    Albumin/Globulin " Ratio 1.7    BUN/Creatinine Ratio 11.5    Anion Gap 9.5                    Assessment and Plan    Diagnoses and all orders for this visit:    1. Right upper quadrant abdominal pain (Primary)  -     US Gallbladder; Future  -     CBC (No Diff); Future  -     Comprehensive Metabolic Panel; Future  -     Celiac Disease Panel; Future  -     Case Request; Standing    2. Diarrhea, unspecified type  -     Enteric Bacterial Panel - Stool, Per Rectum; Future  -     Enteric Parasite Panel - Stool, Per Rectum; Future  -     Fecal Lactoferrin Qual. - Stool, Per Rectum; Future  -     Clostridioides difficile Toxin - Stool, Per Rectum; Future  -     Occult Blood, Fecal By Immunoassay - Stool, Per Rectum; Future  -     Celiac Disease Panel; Future    3. Flatulence    4. Intestinal metaplasia of gastric mucosa  -     Case Request; Standing    5. History of gastric bypass  -     Case Request; Standing    Other orders  -     Follow Anesthesia Guidelines / Protocol; Standing  -     Follow Anesthesia Guidelines / Protocol; Future  -     Obtain Informed Consent; Future  -     Verify NPO; Standing  -     Obtain Informed Consent; Standing              Follow Up   Return for follow up after procedure.  EGD for RUQ pain Surgical Risk and Benefits: Possible risks/complications, benefits, and alternatives to surgical or invasive procedure have been explained to patient and/or legal guardian; risks include bleeding, infection, and perforation. Patient has been evaluated and can tolerate anesthesia and/or sedation. Risks, benefits, and alternatives to anesthesia and sedation have been explained to patient and/or legal guardian.  Plavix- Dr Li for cardio clearance also  Stools if all negative repeat colon  Celiac CBC CMP   RUQ US r/o gallstones     Patient was given instructions and counseling regarding her condition or for health maintenance advice. Please see specific information pulled into the AVS if appropriate.

## 2024-08-19 ENCOUNTER — LAB (OUTPATIENT)
Dept: LAB | Facility: HOSPITAL | Age: 64
End: 2024-08-19
Payer: COMMERCIAL

## 2024-08-19 DIAGNOSIS — R19.7 DIARRHEA, UNSPECIFIED TYPE: ICD-10-CM

## 2024-08-19 DIAGNOSIS — R10.11 RIGHT UPPER QUADRANT ABDOMINAL PAIN: ICD-10-CM

## 2024-08-19 LAB
027 TOXIN: NORMAL
ALBUMIN SERPL-MCNC: 3.9 G/DL (ref 3.5–5.2)
ALBUMIN/GLOB SERPL: 1.7 G/DL
ALP SERPL-CCNC: 74 U/L (ref 39–117)
ALT SERPL W P-5'-P-CCNC: 20 U/L (ref 1–33)
ANION GAP SERPL CALCULATED.3IONS-SCNC: 9.5 MMOL/L (ref 5–15)
AST SERPL-CCNC: 22 U/L (ref 1–32)
BILIRUB SERPL-MCNC: 0.4 MG/DL (ref 0–1.2)
BUN SERPL-MCNC: 10 MG/DL (ref 8–23)
BUN/CREAT SERPL: 13.5 (ref 7–25)
C DIFF TOX GENS STL QL NAA+PROBE: NEGATIVE
CALCIUM SPEC-SCNC: 8.9 MG/DL (ref 8.6–10.5)
CHLORIDE SERPL-SCNC: 104 MMOL/L (ref 98–107)
CO2 SERPL-SCNC: 25.5 MMOL/L (ref 22–29)
CREAT SERPL-MCNC: 0.74 MG/DL (ref 0.57–1)
DEPRECATED RDW RBC AUTO: 39.5 FL (ref 37–54)
EGFRCR SERPLBLD CKD-EPI 2021: 91.6 ML/MIN/1.73
ERYTHROCYTE [DISTWIDTH] IN BLOOD BY AUTOMATED COUNT: 11.5 % (ref 12.3–15.4)
GLOBULIN UR ELPH-MCNC: 2.3 GM/DL
GLUCOSE SERPL-MCNC: 92 MG/DL (ref 65–99)
HCT VFR BLD AUTO: 39.1 % (ref 34–46.6)
HEMOCCULT STL QL IA: NEGATIVE
HGB BLD-MCNC: 13 G/DL (ref 12–15.9)
LACTOFERRIN STL QL LA: POSITIVE
MCH RBC QN AUTO: 31.5 PG (ref 26.6–33)
MCHC RBC AUTO-ENTMCNC: 33.2 G/DL (ref 31.5–35.7)
MCV RBC AUTO: 94.7 FL (ref 79–97)
PLATELET # BLD AUTO: 330 10*3/MM3 (ref 140–450)
PMV BLD AUTO: 10.2 FL (ref 6–12)
POTASSIUM SERPL-SCNC: 4.5 MMOL/L (ref 3.5–5.2)
PROT SERPL-MCNC: 6.2 G/DL (ref 6–8.5)
RBC # BLD AUTO: 4.13 10*6/MM3 (ref 3.77–5.28)
SODIUM SERPL-SCNC: 139 MMOL/L (ref 136–145)
WBC NRBC COR # BLD AUTO: 6.88 10*3/MM3 (ref 3.4–10.8)

## 2024-08-19 PROCEDURE — 82274 ASSAY TEST FOR BLOOD FECAL: CPT

## 2024-08-19 PROCEDURE — 80053 COMPREHEN METABOLIC PANEL: CPT

## 2024-08-19 PROCEDURE — 85027 COMPLETE CBC AUTOMATED: CPT

## 2024-08-19 PROCEDURE — 82784 ASSAY IGA/IGD/IGG/IGM EACH: CPT

## 2024-08-19 PROCEDURE — 87493 C DIFF AMPLIFIED PROBE: CPT

## 2024-08-19 PROCEDURE — 86364 TISS TRNSGLTMNASE EA IG CLAS: CPT

## 2024-08-19 PROCEDURE — 83630 LACTOFERRIN FECAL (QUAL): CPT

## 2024-08-19 PROCEDURE — 36415 COLL VENOUS BLD VENIPUNCTURE: CPT

## 2024-08-19 PROCEDURE — 86231 EMA EACH IG CLASS: CPT

## 2024-08-19 PROCEDURE — 87506 IADNA-DNA/RNA PROBE TQ 6-11: CPT

## 2024-08-20 ENCOUNTER — PREP FOR SURGERY (OUTPATIENT)
Dept: OTHER | Facility: HOSPITAL | Age: 64
End: 2024-08-20
Payer: COMMERCIAL

## 2024-08-20 ENCOUNTER — TELEPHONE (OUTPATIENT)
Dept: GASTROENTEROLOGY | Facility: CLINIC | Age: 64
End: 2024-08-20
Payer: COMMERCIAL

## 2024-08-20 DIAGNOSIS — R19.7 DIARRHEA, UNSPECIFIED TYPE: ICD-10-CM

## 2024-08-20 DIAGNOSIS — R19.5 ABNORMAL STOOL TEST: Primary | ICD-10-CM

## 2024-08-20 LAB
C COLI+JEJ+UPSA DNA STL QL NAA+NON-PROBE: NOT DETECTED
CRYPTOSP DNA STL QL NAA+NON-PROBE: NOT DETECTED
E HISTOLYT DNA STL QL NAA+NON-PROBE: NOT DETECTED
EC STX1+STX2 GENES STL QL NAA+NON-PROBE: NOT DETECTED
ENDOMYSIUM IGA SER QL: NEGATIVE
G LAMBLIA DNA STL QL NAA+NON-PROBE: NOT DETECTED
IGA SERPL-MCNC: 118 MG/DL (ref 87–352)
S ENT+BONG DNA STL QL NAA+NON-PROBE: NOT DETECTED
SHIGELLA SP+EIEC IPAH ST NAA+NON-PROBE: NOT DETECTED
TTG IGA SER-ACNC: <2 U/ML (ref 0–3)

## 2024-08-20 RX ORDER — POLYETHYLENE GLYCOL 3350, SODIUM SULFATE, SODIUM CHLORIDE, POTASSIUM CHLORIDE, ASCORBIC ACID, SODIUM ASCORBATE 140-9-5.2G
1 KIT ORAL TAKE AS DIRECTED
Qty: 1 EACH | Refills: 0 | Status: SHIPPED | OUTPATIENT
Start: 2024-08-20 | End: 2024-08-21

## 2024-08-20 NOTE — TELEPHONE ENCOUNTER
Provider sent plenvu to patients pharmacy today, same day pharmacy called asking for a PA.       PA has been submitted via cover my meds. Once response is received, I will notify patient.

## 2024-09-04 ENCOUNTER — TELEPHONE (OUTPATIENT)
Dept: GASTROENTEROLOGY | Facility: CLINIC | Age: 64
End: 2024-09-04
Payer: COMMERCIAL

## 2024-09-04 NOTE — TELEPHONE ENCOUNTER
Clearance received from Dr. Li on 09.03.24.    Patient cleared: Yes    Medications to be stopped/continued: Plavix - OK to hold 5 days prior to procedure     Reviewed instruction to hold Wegovy for 7 days prior to procedure.    Any follow up or special instructions noted: No     Patient contacted and verbalized understanding: Yes      Patient states she hasn't picked up prep yet, but will contact the pharmacy as soon as she can.  Advised patient to reach out with any questions.  Patient verbalized understanding.

## 2024-09-09 ENCOUNTER — TELEPHONE (OUTPATIENT)
Dept: GASTROENTEROLOGY | Facility: CLINIC | Age: 64
End: 2024-09-09
Payer: COMMERCIAL

## 2024-09-09 NOTE — TELEPHONE ENCOUNTER
Provider: DR. SNYDER    Caller: JEAN PIERRE DAI    Relationship to Patient: SELF    Pharmacy: [facility]    Phone Number: [phone]    Reason for Call: PT CALLED STATING THAT HER COLONOSCOPY PREP REQUIRES A PRIOR AUTH// PT WOULD LIKE AN UPDATE ON PRIOR AUTH// IF PRIOR AUTH DOES NOT GO THROUGH, PT WOULD LIKE FOR AN ALTERNATIVE PREP MEDICATION, THAT IS COVERED BY HER INSURANCE SENT OVER// PLEASE CALL PT BACK

## 2024-09-10 RX ORDER — POLYETHYLENE GLYCOL 3350, SODIUM SULFATE, POTASSIUM CHLORIDE, MAGNESIUM SULFATE, AND SODIUM CHLORIDE FOR ORAL SOLUTION 178.7-7.3G
1 KIT ORAL TAKE AS DIRECTED
Qty: 1 EACH | Refills: 0 | Status: SHIPPED | OUTPATIENT
Start: 2024-09-10

## 2024-09-10 NOTE — TELEPHONE ENCOUNTER
PA for plenvu was denied.     Denial letter did state clenpiq, suprep and suflave is preferred. Please advise and send prep to [place] pharmacy. I will call patient to notify and send new prep instructions.

## 2024-09-10 NOTE — TELEPHONE ENCOUNTER
Patient has been notified of change and requested instructions be sent via my chart.   My chart message sent.

## 2024-09-11 ENCOUNTER — HOSPITAL ENCOUNTER (OUTPATIENT)
Dept: ULTRASOUND IMAGING | Facility: HOSPITAL | Age: 64
Discharge: HOME | End: 2024-09-11
Admitting: NURSE PRACTITIONER
Payer: COMMERCIAL

## 2024-09-11 DIAGNOSIS — R10.11 RIGHT UPPER QUADRANT ABDOMINAL PAIN: ICD-10-CM

## 2024-09-11 PROCEDURE — 76705 ECHO EXAM OF ABDOMEN: CPT

## 2024-09-19 ENCOUNTER — OFFICE VISIT (OUTPATIENT)
Dept: FAMILY MEDICINE CLINIC | Facility: CLINIC | Age: 64
End: 2024-09-19
Payer: COMMERCIAL

## 2024-09-19 VITALS
HEIGHT: 67 IN | HEART RATE: 75 BPM | OXYGEN SATURATION: 98 % | TEMPERATURE: 98 F | RESPIRATION RATE: 16 BRPM | DIASTOLIC BLOOD PRESSURE: 71 MMHG | WEIGHT: 163.7 LBS | BODY MASS INDEX: 25.69 KG/M2 | SYSTOLIC BLOOD PRESSURE: 105 MMHG

## 2024-09-19 DIAGNOSIS — K21.9 CHRONIC GERD: Chronic | ICD-10-CM

## 2024-09-19 DIAGNOSIS — D50.0 IRON DEFICIENCY ANEMIA DUE TO CHRONIC BLOOD LOSS: Chronic | ICD-10-CM

## 2024-09-19 DIAGNOSIS — J30.9 CHRONIC ALLERGIC RHINITIS: Chronic | ICD-10-CM

## 2024-09-19 DIAGNOSIS — I10 PRIMARY HYPERTENSION: Chronic | ICD-10-CM

## 2024-09-19 DIAGNOSIS — Z98.84 HISTORY OF GASTRIC BYPASS: ICD-10-CM

## 2024-09-19 DIAGNOSIS — I25.10 ATHEROSCLEROSIS OF NATIVE CORONARY ARTERY OF NATIVE HEART WITHOUT ANGINA PECTORIS: Chronic | ICD-10-CM

## 2024-09-19 DIAGNOSIS — N95.9 POSTMENOPAUSAL SYMPTOMS: Chronic | ICD-10-CM

## 2024-09-19 DIAGNOSIS — K52.9 CHRONIC DIARRHEA: ICD-10-CM

## 2024-09-19 DIAGNOSIS — E66.3 OVERWEIGHT (BMI 25.0-29.9): Primary | Chronic | ICD-10-CM

## 2024-09-19 DIAGNOSIS — E78.2 MIXED HYPERLIPIDEMIA: Chronic | ICD-10-CM

## 2024-09-19 DIAGNOSIS — Z98.61 CORONARY ANGIOPLASTY STATUS: Chronic | ICD-10-CM

## 2024-09-19 PROCEDURE — 90471 IMMUNIZATION ADMIN: CPT | Performed by: FAMILY MEDICINE

## 2024-09-19 PROCEDURE — 99214 OFFICE O/P EST MOD 30 MIN: CPT | Performed by: FAMILY MEDICINE

## 2024-09-19 PROCEDURE — 90656 IIV3 VACC NO PRSV 0.5 ML IM: CPT | Performed by: FAMILY MEDICINE

## 2024-09-19 RX ORDER — FLUTICASONE PROPIONATE 50 UG/1
2 SPRAY, METERED NASAL DAILY
Qty: 11.1 ML | Refills: 5 | Status: SHIPPED | OUTPATIENT
Start: 2024-09-19

## 2024-09-19 RX ORDER — ESTRADIOL 0.1 MG/D
1 FILM, EXTENDED RELEASE TRANSDERMAL 2 TIMES WEEKLY
Qty: 8 EACH | Refills: 11 | Status: SHIPPED | OUTPATIENT
Start: 2024-09-19

## 2024-09-19 RX ORDER — ONDANSETRON 8 MG/1
8 TABLET, ORALLY DISINTEGRATING ORAL EVERY 8 HOURS PRN
Qty: 30 TABLET | Refills: 2 | Status: SHIPPED | OUTPATIENT
Start: 2024-09-19

## 2024-09-19 RX ORDER — SEMAGLUTIDE 1.7 MG/.75ML
1.7 INJECTION, SOLUTION SUBCUTANEOUS WEEKLY
Qty: 3 ML | Refills: 5 | Status: SHIPPED | OUTPATIENT
Start: 2024-09-19 | End: 2024-09-25

## 2024-09-19 RX ORDER — CHOLESTYRAMINE LIGHT 4 G/5.7G
4 POWDER, FOR SUSPENSION ORAL 2 TIMES DAILY
Qty: 60 EACH | Refills: 2 | Status: SHIPPED | OUTPATIENT
Start: 2024-09-19

## 2024-09-23 ENCOUNTER — TELEPHONE (OUTPATIENT)
Dept: FAMILY MEDICINE CLINIC | Facility: CLINIC | Age: 64
End: 2024-09-23
Payer: COMMERCIAL

## 2024-09-23 DIAGNOSIS — I25.10 ATHEROSCLEROSIS OF NATIVE CORONARY ARTERY OF NATIVE HEART WITHOUT ANGINA PECTORIS: ICD-10-CM

## 2024-09-23 DIAGNOSIS — Z98.61 CORONARY ANGIOPLASTY STATUS: Primary | ICD-10-CM

## 2024-09-23 DIAGNOSIS — E66.3 OVERWEIGHT (BMI 25.0-29.9): ICD-10-CM

## 2024-09-24 NOTE — PRE-PROCEDURE INSTRUCTIONS
"Instructed on date and arrival time of 0730. Instructed that arrival time is not their procedure time but allows time to prepare for procedure.  Come to entrance \"C\". Must have  over age 18 to drive home.  May have two visitors; however, children under 12 must stay in waiting room.  Discussed clear liquid diet (no red or purple), bowel prep, and NPO.  May take medications as usual except for blood thinners, diabetic medications, and weight loss medications.  Verbalized understanding of instructions given.  Instructed to call for questions or concerns.  Hold Wegovy for one week prior to procedure.  Hold Plavix for 5 days prior to procedure.  Clearance noted in chart.  Awaiting cardiac clearance.  "

## 2024-09-30 ENCOUNTER — TELEPHONE (OUTPATIENT)
Dept: FAMILY MEDICINE CLINIC | Facility: CLINIC | Age: 64
End: 2024-09-30
Payer: COMMERCIAL

## 2024-09-30 DIAGNOSIS — Z98.61 CORONARY ANGIOPLASTY STATUS: ICD-10-CM

## 2024-09-30 DIAGNOSIS — I25.10 ATHEROSCLEROSIS OF NATIVE CORONARY ARTERY OF NATIVE HEART WITHOUT ANGINA PECTORIS: ICD-10-CM

## 2024-09-30 DIAGNOSIS — E66.3 OVERWEIGHT (BMI 25.0-29.9): ICD-10-CM

## 2024-09-30 NOTE — TELEPHONE ENCOUNTER
Caller: Viviana Hodges    Relationship: Self    Best call back number: [phone]    What is the best time to reach you: ANY    Who are you requesting to speak with (clinical staff, provider,  specific staff member): CLINICAL    Do you know the name of the person who called: PATIENT    What was the call regarding: PATIENT WOULD LIKE TO KNOW IF SHE COULD TAKE OZEMPIC AND DIAL IT BACK TO THE DOSAGE SHE NEEDS. SHE IS ALSO HAVING DIFFICULTY GETTING THE ZEPBOUND AT THE 1.7 DOSAGE. PLEASE CALL PATIENT BACK AND ADVISE.    Is it okay if the provider responds through MyChart: YES

## 2024-10-01 ENCOUNTER — ANESTHESIA EVENT (OUTPATIENT)
Dept: GASTROENTEROLOGY | Facility: HOSPITAL | Age: 64
End: 2024-10-01
Payer: COMMERCIAL

## 2024-10-01 ENCOUNTER — TELEPHONE (OUTPATIENT)
Dept: FAMILY MEDICINE CLINIC | Facility: CLINIC | Age: 64
End: 2024-10-01
Payer: COMMERCIAL

## 2024-10-01 DIAGNOSIS — E66.3 OVERWEIGHT (BMI 25.0-29.9): ICD-10-CM

## 2024-10-01 DIAGNOSIS — Z98.61 CORONARY ANGIOPLASTY STATUS: ICD-10-CM

## 2024-10-01 DIAGNOSIS — I25.10 ATHEROSCLEROSIS OF NATIVE CORONARY ARTERY OF NATIVE HEART WITHOUT ANGINA PECTORIS: ICD-10-CM

## 2024-10-01 NOTE — TELEPHONE ENCOUNTER
Patients insurance denied zepbound. They will not cover it unless BMI is 35 or higher.  Wegovy has to be 27 or higher, patients is at 25.64 currently.  She is asking what can she do to maintain her current weight?

## 2024-10-01 NOTE — TELEPHONE ENCOUNTER
Caller: Viviana Hodges    Relationship: Self    Best call back number: [phone]    Requested Prescriptions:   Requested Prescriptions     Pending Prescriptions Disp Refills    Tirzepatide-Weight Management (ZEPBOUND) 5 MG/0.5ML solution auto-injector 2 mL 2     Sig: Inject 0.5 mL under the skin into the appropriate area as directed 1 (One) Time Per Week.        Pharmacy where request should be sent: [address] [phone] [facility] [phone]      Last office visit with prescribing clinician: 9/19/2024   Last telemedicine visit with prescribing clinician: Visit date not found   Next office visit with prescribing clinician: Visit date not found     Additional details provided by patient: PHARMACY HAS PRESCRIPTION, PRIOR AUTHORIZATION IS NEEDED FOR INSURANCE       Nikhil Mayers Rep   10/01/24 09:20 EDT

## 2024-10-01 NOTE — TELEPHONE ENCOUNTER
I'm not sure sending again will do anything.  Only other option would be an appeal but I feel it won't be approved due to current BMI.  Are there any other medication options besides the injectables she could take to help maintain current weight?

## 2024-10-01 NOTE — ANESTHESIA PREPROCEDURE EVALUATION
Anesthesia Evaluation     Patient summary reviewed and Nursing notes reviewed   NPO Solid Status: > 8 hours  NPO Liquid Status: > 2 hours           Airway   Mallampati: II  TM distance: >3 FB  Neck ROM: full  No difficulty expected  Dental - normal exam     Pulmonary - normal exam   Cardiovascular - normal exam  Exercise tolerance: excellent (>7 METS)    ECG reviewed  PT is on anticoagulation therapy    (+) hypertension, CAD, angina (hx of angina at rest with preinfarct syndrome), hyperlipidemia      Neuro/Psych  (+) psychiatric history Anxiety and Depression  GI/Hepatic/Renal/Endo    (+) GERD, PUD, GI bleeding     Musculoskeletal     Abdominal    Substance History      OB/GYN          Other   arthritis (hx of Lumbar DDD, OA),     ROS/Med Hx Other: EKG 03/02/22: HR 61,   Sinus rhythm  Low voltage, precordial leads  No previous ECG available for comparison    Noninvasive SPECT test 09/14/23:   Gated SPECT shows normal systolic wall thickening in all regions.  Ejection fraction is 89%.  End-diastolic volume was normal at 45 mL.   CONCLUSION:   This is a normal regadenoson SPECT perfusion study with tissue attenuation   artifact.     Cardiac clearance received from Dr. Li on 08/12/24     Hx gastric bypass    Last dose Zepbound - ( patient states she has never started this medication )                  Anesthesia Plan    ASA 3     general   total IV anesthesia  (Total IV Anesthesia    Patient understands anesthesia not responsible for dental damage.  )  intravenous induction     Anesthetic plan, risks, benefits, and alternatives have been provided, discussed and informed consent has been obtained with: patient.  Pre-procedure education provided  Plan discussed with CRNA.      CODE STATUS:

## 2024-10-02 ENCOUNTER — ANESTHESIA (OUTPATIENT)
Dept: GASTROENTEROLOGY | Facility: HOSPITAL | Age: 64
End: 2024-10-02
Payer: COMMERCIAL

## 2024-10-02 ENCOUNTER — HOSPITAL ENCOUNTER (OUTPATIENT)
Facility: HOSPITAL | Age: 64
Setting detail: HOSPITAL OUTPATIENT SURGERY
Discharge: HOME | End: 2024-10-02
Attending: INTERNAL MEDICINE | Admitting: INTERNAL MEDICINE
Payer: COMMERCIAL

## 2024-10-02 VITALS
RESPIRATION RATE: 17 BRPM | OXYGEN SATURATION: 99 % | HEIGHT: 68 IN | DIASTOLIC BLOOD PRESSURE: 60 MMHG | BODY MASS INDEX: 25.26 KG/M2 | SYSTOLIC BLOOD PRESSURE: 138 MMHG | TEMPERATURE: 97.5 F | HEART RATE: 71 BPM | WEIGHT: 166.67 LBS

## 2024-10-02 DIAGNOSIS — Z98.84 HISTORY OF GASTRIC BYPASS: ICD-10-CM

## 2024-10-02 DIAGNOSIS — K31.A0 INTESTINAL METAPLASIA OF GASTRIC MUCOSA: ICD-10-CM

## 2024-10-02 DIAGNOSIS — R10.11 RIGHT UPPER QUADRANT ABDOMINAL PAIN: ICD-10-CM

## 2024-10-02 PROCEDURE — 25010000002 PROPOFOL 10 MG/ML EMULSION

## 2024-10-02 PROCEDURE — 25810000003 LACTATED RINGERS PER 1000 ML

## 2024-10-02 PROCEDURE — 88305 TISSUE EXAM BY PATHOLOGIST: CPT | Performed by: INTERNAL MEDICINE

## 2024-10-02 PROCEDURE — 45380 COLONOSCOPY AND BIOPSY: CPT | Performed by: INTERNAL MEDICINE

## 2024-10-02 PROCEDURE — 25010000002 LIDOCAINE PF 2% 2 % SOLUTION

## 2024-10-02 PROCEDURE — 25010000002 PROPOFOL 500 MG/50ML EMULSION

## 2024-10-02 PROCEDURE — 43239 EGD BIOPSY SINGLE/MULTIPLE: CPT | Performed by: INTERNAL MEDICINE

## 2024-10-02 RX ORDER — PROPOFOL 10 MG/ML
VIAL (ML) INTRAVENOUS AS NEEDED
Status: DISCONTINUED | OUTPATIENT
Start: 2024-10-02 | End: 2024-10-02 | Stop reason: SURG

## 2024-10-02 RX ORDER — EPHEDRINE SULFATE 50 MG/ML
INJECTION INTRAVENOUS AS NEEDED
Status: DISCONTINUED | OUTPATIENT
Start: 2024-10-02 | End: 2024-10-02 | Stop reason: SURG

## 2024-10-02 RX ORDER — PROPOFOL 10 MG/ML
INJECTION, EMULSION INTRAVENOUS CONTINUOUS PRN
Status: DISCONTINUED | OUTPATIENT
Start: 2024-10-02 | End: 2024-10-02 | Stop reason: SURG

## 2024-10-02 RX ORDER — SODIUM CHLORIDE, SODIUM LACTATE, POTASSIUM CHLORIDE, CALCIUM CHLORIDE 600; 310; 30; 20 MG/100ML; MG/100ML; MG/100ML; MG/100ML
30 INJECTION, SOLUTION INTRAVENOUS CONTINUOUS
Status: DISCONTINUED | OUTPATIENT
Start: 2024-10-02 | End: 2024-10-02 | Stop reason: HOSPADM

## 2024-10-02 RX ORDER — LIDOCAINE HYDROCHLORIDE 20 MG/ML
INJECTION, SOLUTION EPIDURAL; INFILTRATION; INTRACAUDAL; PERINEURAL AS NEEDED
Status: DISCONTINUED | OUTPATIENT
Start: 2024-10-02 | End: 2024-10-02 | Stop reason: SURG

## 2024-10-02 RX ADMIN — SODIUM CHLORIDE, POTASSIUM CHLORIDE, SODIUM LACTATE AND CALCIUM CHLORIDE 30 ML/HR: 600; 310; 30; 20 INJECTION, SOLUTION INTRAVENOUS at 08:19

## 2024-10-02 RX ADMIN — PROPOFOL 50 MG: 10 INJECTION, EMULSION INTRAVENOUS at 09:15

## 2024-10-02 RX ADMIN — PROPOFOL 100 MG: 10 INJECTION, EMULSION INTRAVENOUS at 09:14

## 2024-10-02 RX ADMIN — LIDOCAINE HYDROCHLORIDE 40 MG: 20 INJECTION, SOLUTION EPIDURAL; INFILTRATION; INTRACAUDAL; PERINEURAL at 09:14

## 2024-10-02 RX ADMIN — EPHEDRINE SULFATE 10 MG: 50 INJECTION INTRAVENOUS at 09:47

## 2024-10-02 RX ADMIN — EPHEDRINE SULFATE 10 MG: 50 INJECTION INTRAVENOUS at 09:45

## 2024-10-02 RX ADMIN — PROPOFOL 250 MCG/KG/MIN: 10 INJECTION, EMULSION INTRAVENOUS at 09:14

## 2024-10-02 NOTE — ANESTHESIA POSTPROCEDURE EVALUATION
Patient: Viviana Hodges    Procedure Summary       Date: 10/02/24 Room / Location: [facility] ENDOSCOPY 4 / [facility]    Anesthesia Start: 0911 Anesthesia Stop: 0958    Procedures:       ESOPHAGOGASTRODUODENOSCOPY WITH BIOPSIES      COLONOSCOPY WITH BIOPSIES Diagnosis:       Right upper quadrant abdominal pain      Intestinal metaplasia of gastric mucosa      History of gastric bypass      (Right upper quadrant abdominal pain [R10.11])      (Intestinal metaplasia of gastric mucosa [K31.A0])      (History of gastric bypass [Z98.84])      (Abnormal stool test [R19.5])      (Diarrhea, unspecified type [R19.7])    Surgeons: Santana Cabrera MD Provider: Danis Quinn CRNA    Anesthesia Type: general ASA Status: 3            Anesthesia Type: general    Vitals  Vitals Value Taken Time   /60 10/02/24 1018   Temp 36.4 °C (97.5 °F) 10/02/24 1018   Pulse 71 10/02/24 1018   Resp 17 10/02/24 1018   SpO2 99 % 10/02/24 1018           Post Anesthesia Care and Evaluation    Patient location during evaluation: bedside  Patient participation: complete - patient participated  Post-procedure mental status: acceptable.  Pain management: satisfactory to patient    Airway patency: patent  Anesthetic complications: No anesthetic complications  PONV Status: controlled  Cardiovascular status: acceptable  Respiratory status: acceptable    Comments: Per chart review

## 2024-10-02 NOTE — H&P
Pre Procedure History & Physical    Chief Complaint:   Right upper quadrant abdominal pain, gastritis, diarrhea    Subjective     HPI:   Diarrhea and abdominal pain, history of gastric bypass surgery    Past Medical History:   Past Medical History:   Diagnosis Date    Allergic     Angina at rest     Anxiety     Bleeding ulcer     Bursitis of left shoulder 2018    CAD (coronary artery disease) 2015    Chronic allergic rhinitis     DDD (degenerative disc disease), lumbar 2016    GERD (gastroesophageal reflux disease) 2016    History of coronary artery stent placement     HLD (hyperlipidemia)     HTN (hypertension)     IBS (irritable bowel syndrome) 2016    MDD (major depressive disorder)     Nondisplaced fracture of proximal end of right fibula 2017    OA (osteoarthritis)     Obesity (BMI 30-39.9)     Seasonal allergies        Past Surgical History:  Past Surgical History:   Procedure Laterality Date    CARDIAC SURGERY       SECTION      COLONOSCOPY      COLONOSCOPY N/A 2022    Procedure: COLONOSCOPY;  Surgeon: Santana Cabrera MD;  Location: [facility] ENDOSCOPY;  Service: Gastroenterology;  Laterality: N/A;  diverticulosis    CORONARY ANGIOPLASTY WITH STENT PLACEMENT      ENDOSCOPY N/A 2022    Procedure: ESOPHAGOGASTRODUODENOSCOPY WITH BIOPSY;  Surgeon: Santana Cabrera MD;  Location: [facility] ENDOSCOPY;  Service: Gastroenterology;  Laterality: N/A;  PREVIOUS SURGERY/GASTRIC EROSIONS    ENDOSCOPY N/A 2022    Procedure: ESOPHAGOGASTRODUODENOSCOPY;  Surgeon: Santana Cabrera MD;  Location: [facility] ENDOSCOPY;  Service: Gastroenterology;  Laterality: N/A;  PREVIOUS SURGERY    EYE SURGERY      Implant     GASTRIC BYPASS      HYSTERECTOMY  2009    LAPAROSCOPIC GASTRIC BANDING  2008    SQUAMOUS CELL CARCINOMA EXCISION Right 2023    Right thumb    TONSILLECTOMY         Family History:  Family History   Problem Relation Age of Onset    Heart disease  Mother     Diabetes Mother     Arthritis Mother     Osteoporosis Mother     Heart disease Father     Diabetes Father     Arthritis Father     Stroke Sister     Heart disease Other     Diabetes Other     Colon cancer Neg Hx        Social History:   reports that she quit smoking about 17 years ago. Her smoking use included cigarettes. She started smoking about 42 years ago. She has a 12.5 pack-year smoking history. She has never used smokeless tobacco. She reports current alcohol use of about 2.0 standard drinks of alcohol per week. She reports that she does not use drugs.    Medications:   Medications Prior to Admission   Medication Sig Dispense Refill Last Dose    buPROPion XL (WELLBUTRIN XL) 300 MG 24 hr tablet TAKE 1 TABLET BY MOUTH DAILY 90 tablet 3 10/1/2024    carvedilol (COREG) 6.25 MG tablet Take 1 tablet by mouth 2 (Two) Times a Day With Meals. 180 tablet 3 10/1/2024    cetirizine (zyrTEC) 10 MG tablet TAKE 1 TABLET DAILY 90 tablet 3 10/1/2024    cholestyramine light (Prevalite) 4 g packet Take 1 packet by mouth 2 (Two) Times a Day. 60 each 2 10/1/2024    ezetimibe-simvastatin (VYTORIN) 10-40 MG per tablet Take 1 tablet by mouth Every Night.   10/1/2024    fluticasone (FLONASE) 50 MCG/ACT nasal spray Administer 2 sprays into the nostril(s) as directed by provider Daily. 11.1 mL 5 10/1/2024    isosorbide mononitrate (IMDUR) 30 MG 24 hr tablet Take 1 tablet by mouth Daily.   10/1/2024    ondansetron ODT (ZOFRAN-ODT) 8 MG disintegrating tablet Place 1 tablet on the tongue Every 8 (Eight) Hours As Needed for Nausea or Vomiting. 30 tablet 2 10/1/2024    pantoprazole (PROTONIX) 40 MG EC tablet TAKE 1 TABLET BY MOUTH TWICE  DAILY FOR ESOPHAGUS INFLAMMATION WITH EROSION, STOMACH ULCER 180 tablet 3 10/1/2024    aspirin 81 MG chewable tablet Chew 1 tablet Daily.   9/27/2024    clopidogrel (PLAVIX) 75 MG tablet    9/27/2024    cyclobenzaprine (FLEXERIL) 10 MG tablet TAKE 1 TABLET BY MOUTH 3 TIMES  DAILY FOR BACK PAIN  "270 tablet 3 More than a month    estradiol (VIVELLE-DOT) 0.1 MG/24HR patch Place 1 patch on the skin as directed by provider 2 (Two) Times a Week. 8 each 11     nitroglycerin (NITROSTAT) 0.4 MG SL tablet Place 1 tablet under the tongue Every 5 (Five) Minutes As Needed for Chest Pain. Take no more than 3 doses in 15 minutes.       Tirzepatide-Weight Management (ZEPBOUND) 5 MG/0.5ML solution auto-injector Inject 0.5 mL under the skin into the appropriate area as directed 1 (One) Time Per Week. 2 mL 2        Allergies:  Shellfish allergy, Shellfish-derived products, and Penicillins        Objective     Blood pressure 135/75, pulse 61, temperature 97.5 °F (36.4 °C), temperature source Temporal, resp. rate 20, height 172.7 cm (68\"), weight 75.6 kg (166 lb 10.7 oz), SpO2 98%, not currently breastfeeding.    Physical Exam   Constitutional: Pt is oriented to person, place, and time and well-developed, well-nourished, and in no distress.   Mouth/Throat: Oropharynx is clear and moist.   Neck: Normal range of motion.   Cardiovascular: Normal rate, regular rhythm and normal heart sounds.    Pulmonary/Chest: Effort normal and breath sounds normal.   Abdominal: Soft. Nontender  Skin: Skin is warm and dry.   Psychiatric: Mood, memory, affect and judgment normal.     Assessment & Plan     Diagnosis:  Chronic diarrhea and abdominal pain    Anticipated Surgical Procedure:  EGD and colonoscopy    The risks, benefits, and alternatives of this procedure have been discussed with the patient or the responsible party- the patient understands and agrees to proceed.            "

## 2024-10-03 LAB
CYTO UR: NORMAL
LAB AP CASE REPORT: NORMAL
LAB AP CLINICAL INFORMATION: NORMAL
PATH REPORT.FINAL DX SPEC: NORMAL
PATH REPORT.GROSS SPEC: NORMAL

## 2024-10-03 NOTE — PROGRESS NOTES
EGD 10/2/24: Normal esophagus, evidence of gastric bypass found, biopsy taken-mild chronic inactive gastritis otherwise negative, normal duodenum  Colonoscopy 10/2/24: Good prep, normal colonoscopy-random colon biopsies are negative  Recall: 10 years  Keep follow

## 2024-10-04 ENCOUNTER — HOSPITAL ENCOUNTER (OUTPATIENT)
Dept: MAMMOGRAPHY | Facility: HOSPITAL | Age: 64
Discharge: HOME | End: 2024-10-04
Payer: COMMERCIAL

## 2024-10-04 DIAGNOSIS — Z12.31 SCREENING MAMMOGRAM FOR BREAST CANCER: ICD-10-CM

## 2024-10-04 PROCEDURE — 77063 BREAST TOMOSYNTHESIS BI: CPT

## 2024-10-04 PROCEDURE — 77067 SCR MAMMO BI INCL CAD: CPT

## 2024-12-02 RX ORDER — BUPROPION HYDROCHLORIDE 300 MG/1
300 TABLET ORAL DAILY
Qty: 90 TABLET | Refills: 3 | Status: ON HOLD | OUTPATIENT
Start: 2024-12-02

## 2024-12-04 ENCOUNTER — OFFICE VISIT (OUTPATIENT)
Dept: FAMILY MEDICINE CLINIC | Facility: CLINIC | Age: 64
End: 2024-12-04
Payer: COMMERCIAL

## 2024-12-04 VITALS
OXYGEN SATURATION: 97 % | SYSTOLIC BLOOD PRESSURE: 123 MMHG | RESPIRATION RATE: 16 BRPM | WEIGHT: 176 LBS | BODY MASS INDEX: 26.67 KG/M2 | HEIGHT: 68 IN | HEART RATE: 73 BPM | TEMPERATURE: 97.2 F | DIASTOLIC BLOOD PRESSURE: 80 MMHG

## 2024-12-04 DIAGNOSIS — K22.4 ESOPHAGEAL DYSMOTILITY AFTER BARIATRIC SURGERY: Chronic | ICD-10-CM

## 2024-12-04 DIAGNOSIS — I25.10 ATHEROSCLEROSIS OF NATIVE CORONARY ARTERY OF NATIVE HEART WITHOUT ANGINA PECTORIS: Chronic | ICD-10-CM

## 2024-12-04 DIAGNOSIS — I25.85 CHRONIC CORONARY MICROVASCULAR DYSFUNCTION: Chronic | ICD-10-CM

## 2024-12-04 DIAGNOSIS — R10.11 RIGHT UPPER QUADRANT ABDOMINAL PAIN: Chronic | ICD-10-CM

## 2024-12-04 DIAGNOSIS — N95.9 POSTMENOPAUSAL SYMPTOMS: ICD-10-CM

## 2024-12-04 DIAGNOSIS — K95.89 ESOPHAGEAL DYSMOTILITY AFTER BARIATRIC SURGERY: Chronic | ICD-10-CM

## 2024-12-04 DIAGNOSIS — Z98.61 CORONARY ANGIOPLASTY STATUS: Chronic | ICD-10-CM

## 2024-12-04 DIAGNOSIS — Z98.84 H/O GASTRIC BYPASS: ICD-10-CM

## 2024-12-04 DIAGNOSIS — K21.9 CHRONIC GERD: Chronic | ICD-10-CM

## 2024-12-04 DIAGNOSIS — J30.9 CHRONIC ALLERGIC RHINITIS: Chronic | ICD-10-CM

## 2024-12-04 DIAGNOSIS — E66.3 OVERWEIGHT (BMI 25.0-29.9): Primary | Chronic | ICD-10-CM

## 2024-12-04 DIAGNOSIS — D62 ACUTE BLOOD LOSS ANEMIA: Chronic | ICD-10-CM

## 2024-12-04 DIAGNOSIS — E78.2 MIXED HYPERLIPIDEMIA: Chronic | ICD-10-CM

## 2024-12-09 ENCOUNTER — APPOINTMENT (OUTPATIENT)
Dept: CT IMAGING | Facility: HOSPITAL | Age: 64
DRG: 378 | End: 2024-12-09
Payer: COMMERCIAL

## 2024-12-09 ENCOUNTER — HOSPITAL ENCOUNTER (INPATIENT)
Facility: HOSPITAL | Age: 64
LOS: 1 days | Discharge: HOME | DRG: 378 | End: 2024-12-11
Attending: EMERGENCY MEDICINE | Admitting: FAMILY MEDICINE
Payer: COMMERCIAL

## 2024-12-09 ENCOUNTER — PREP FOR SURGERY (OUTPATIENT)
Dept: OTHER | Facility: HOSPITAL | Age: 64
End: 2024-12-09
Payer: COMMERCIAL

## 2024-12-09 DIAGNOSIS — D62 ACUTE BLOOD LOSS ANEMIA: Primary | ICD-10-CM

## 2024-12-09 DIAGNOSIS — K92.2 GI BLEED: Primary | ICD-10-CM

## 2024-12-09 DIAGNOSIS — K92.2 GASTROINTESTINAL HEMORRHAGE, UNSPECIFIED GASTROINTESTINAL HEMORRHAGE TYPE: ICD-10-CM

## 2024-12-09 PROBLEM — K62.5 RECTAL BLEEDING: Status: ACTIVE | Noted: 2024-12-09

## 2024-12-09 LAB
ABO GROUP BLD: NORMAL
ALBUMIN SERPL-MCNC: 3.8 G/DL (ref 3.5–5.2)
ALBUMIN/GLOB SERPL: 1.7 G/DL
ALP SERPL-CCNC: 61 U/L (ref 39–117)
ALT SERPL W P-5'-P-CCNC: 23 U/L (ref 1–33)
ANION GAP SERPL CALCULATED.3IONS-SCNC: 10.8 MMOL/L (ref 5–15)
AST SERPL-CCNC: 19 U/L (ref 1–32)
BASOPHILS # BLD AUTO: 0.05 10*3/MM3 (ref 0–0.2)
BASOPHILS NFR BLD AUTO: 0.3 % (ref 0–1.5)
BILIRUB SERPL-MCNC: 0.3 MG/DL (ref 0–1.2)
BLD GP AB SCN SERPL QL: NEGATIVE
BUN SERPL-MCNC: 30 MG/DL (ref 8–23)
BUN/CREAT SERPL: 48.4 (ref 7–25)
CALCIUM SPEC-SCNC: 8.4 MG/DL (ref 8.6–10.5)
CHLORIDE SERPL-SCNC: 101 MMOL/L (ref 98–107)
CO2 SERPL-SCNC: 23.2 MMOL/L (ref 22–29)
CREAT SERPL-MCNC: 0.62 MG/DL (ref 0.57–1)
D-LACTATE SERPL-SCNC: 1.3 MMOL/L (ref 0.5–2)
DEPRECATED RDW RBC AUTO: 42.6 FL (ref 37–54)
EGFRCR SERPLBLD CKD-EPI 2021: 100.8 ML/MIN/1.73
EOSINOPHIL # BLD AUTO: 0.06 10*3/MM3 (ref 0–0.4)
EOSINOPHIL NFR BLD AUTO: 0.4 % (ref 0.3–6.2)
ERYTHROCYTE [DISTWIDTH] IN BLOOD BY AUTOMATED COUNT: 12.4 % (ref 12.3–15.4)
GLOBULIN UR ELPH-MCNC: 2.2 GM/DL
GLUCOSE SERPL-MCNC: 117 MG/DL (ref 65–99)
HCT VFR BLD AUTO: 28.5 % (ref 34–46.6)
HEMOCCULT STL QL IA: POSITIVE
HGB BLD-MCNC: 9.1 G/DL (ref 12–15.9)
HOLD SPECIMEN: NORMAL
HOLD SPECIMEN: NORMAL
IMM GRANULOCYTES # BLD AUTO: 0.07 10*3/MM3 (ref 0–0.05)
IMM GRANULOCYTES NFR BLD AUTO: 0.5 % (ref 0–0.5)
LYMPHOCYTES # BLD AUTO: 3.55 10*3/MM3 (ref 0.7–3.1)
LYMPHOCYTES NFR BLD AUTO: 23.7 % (ref 19.6–45.3)
MCH RBC QN AUTO: 30 PG (ref 26.6–33)
MCHC RBC AUTO-ENTMCNC: 31.9 G/DL (ref 31.5–35.7)
MCV RBC AUTO: 94.1 FL (ref 79–97)
MONOCYTES # BLD AUTO: 0.95 10*3/MM3 (ref 0.1–0.9)
MONOCYTES NFR BLD AUTO: 6.3 % (ref 5–12)
NEUTROPHILS NFR BLD AUTO: 10.29 10*3/MM3 (ref 1.7–7)
NEUTROPHILS NFR BLD AUTO: 68.8 % (ref 42.7–76)
NRBC BLD AUTO-RTO: 0 /100 WBC (ref 0–0.2)
PLATELET # BLD AUTO: 305 10*3/MM3 (ref 140–450)
PMV BLD AUTO: 10.3 FL (ref 6–12)
POTASSIUM SERPL-SCNC: 4.5 MMOL/L (ref 3.5–5.2)
PROT SERPL-MCNC: 6 G/DL (ref 6–8.5)
RBC # BLD AUTO: 3.03 10*6/MM3 (ref 3.77–5.28)
RH BLD: POSITIVE
SODIUM SERPL-SCNC: 135 MMOL/L (ref 136–145)
T&S EXPIRATION DATE: NORMAL
WBC NRBC COR # BLD AUTO: 14.97 10*3/MM3 (ref 3.4–10.8)
WHOLE BLOOD HOLD COAG: NORMAL
WHOLE BLOOD HOLD SPECIMEN: NORMAL

## 2024-12-09 PROCEDURE — 86900 BLOOD TYPING SEROLOGIC ABO: CPT | Performed by: EMERGENCY MEDICINE

## 2024-12-09 PROCEDURE — 82274 ASSAY TEST FOR BLOOD FECAL: CPT | Performed by: EMERGENCY MEDICINE

## 2024-12-09 PROCEDURE — 96375 TX/PRO/DX INJ NEW DRUG ADDON: CPT

## 2024-12-09 PROCEDURE — 36415 COLL VENOUS BLD VENIPUNCTURE: CPT

## 2024-12-09 PROCEDURE — G0378 HOSPITAL OBSERVATION PER HR: HCPCS

## 2024-12-09 PROCEDURE — 96376 TX/PRO/DX INJ SAME DRUG ADON: CPT

## 2024-12-09 PROCEDURE — 99222 1ST HOSP IP/OBS MODERATE 55: CPT | Performed by: FAMILY MEDICINE

## 2024-12-09 PROCEDURE — 25510000001 IOPAMIDOL PER 1 ML: Performed by: FAMILY MEDICINE

## 2024-12-09 PROCEDURE — 86850 RBC ANTIBODY SCREEN: CPT | Performed by: EMERGENCY MEDICINE

## 2024-12-09 PROCEDURE — 74177 CT ABD & PELVIS W/CONTRAST: CPT

## 2024-12-09 PROCEDURE — 96361 HYDRATE IV INFUSION ADD-ON: CPT

## 2024-12-09 PROCEDURE — 86901 BLOOD TYPING SEROLOGIC RH(D): CPT | Performed by: EMERGENCY MEDICINE

## 2024-12-09 PROCEDURE — 99285 EMERGENCY DEPT VISIT HI MDM: CPT

## 2024-12-09 PROCEDURE — 86923 COMPATIBILITY TEST ELECTRIC: CPT

## 2024-12-09 PROCEDURE — 85025 COMPLETE CBC W/AUTO DIFF WBC: CPT

## 2024-12-09 PROCEDURE — 83605 ASSAY OF LACTIC ACID: CPT | Performed by: EMERGENCY MEDICINE

## 2024-12-09 PROCEDURE — 25810000003 SODIUM CHLORIDE 0.9 % SOLUTION: Performed by: FAMILY MEDICINE

## 2024-12-09 PROCEDURE — 80053 COMPREHEN METABOLIC PANEL: CPT | Performed by: EMERGENCY MEDICINE

## 2024-12-09 PROCEDURE — 96374 THER/PROPH/DIAG INJ IV PUSH: CPT

## 2024-12-09 RX ORDER — ONDANSETRON 2 MG/ML
4 INJECTION INTRAMUSCULAR; INTRAVENOUS EVERY 6 HOURS PRN
Status: DISCONTINUED | OUTPATIENT
Start: 2024-12-09 | End: 2024-12-11 | Stop reason: HOSPADM

## 2024-12-09 RX ORDER — IOPAMIDOL 755 MG/ML
100 INJECTION, SOLUTION INTRAVASCULAR
Status: COMPLETED | OUTPATIENT
Start: 2024-12-09 | End: 2024-12-09

## 2024-12-09 RX ORDER — BISACODYL 5 MG/1
5 TABLET, DELAYED RELEASE ORAL DAILY PRN
Status: DISCONTINUED | OUTPATIENT
Start: 2024-12-09 | End: 2024-12-10

## 2024-12-09 RX ORDER — AMOXICILLIN 250 MG
2 CAPSULE ORAL 2 TIMES DAILY PRN
Status: DISCONTINUED | OUTPATIENT
Start: 2024-12-09 | End: 2024-12-10

## 2024-12-09 RX ORDER — PANTOPRAZOLE SODIUM 40 MG/10ML
40 INJECTION, POWDER, LYOPHILIZED, FOR SOLUTION INTRAVENOUS ONCE
Status: COMPLETED | OUTPATIENT
Start: 2024-12-09 | End: 2024-12-09

## 2024-12-09 RX ORDER — POLYETHYLENE GLYCOL 3350 17 G/17G
17 POWDER, FOR SOLUTION ORAL DAILY PRN
Status: DISCONTINUED | OUTPATIENT
Start: 2024-12-09 | End: 2024-12-10

## 2024-12-09 RX ORDER — FAMOTIDINE 10 MG/ML
20 INJECTION, SOLUTION INTRAVENOUS EVERY 12 HOURS SCHEDULED
Status: DISCONTINUED | OUTPATIENT
Start: 2024-12-09 | End: 2024-12-10

## 2024-12-09 RX ORDER — SODIUM CHLORIDE 0.9 % (FLUSH) 0.9 %
10 SYRINGE (ML) INJECTION AS NEEDED
Status: DISCONTINUED | OUTPATIENT
Start: 2024-12-09 | End: 2024-12-10

## 2024-12-09 RX ORDER — SODIUM CHLORIDE 9 MG/ML
40 INJECTION, SOLUTION INTRAVENOUS AS NEEDED
Status: DISCONTINUED | OUTPATIENT
Start: 2024-12-09 | End: 2024-12-11 | Stop reason: HOSPADM

## 2024-12-09 RX ORDER — BISACODYL 10 MG
10 SUPPOSITORY, RECTAL RECTAL DAILY PRN
Status: DISCONTINUED | OUTPATIENT
Start: 2024-12-09 | End: 2024-12-10

## 2024-12-09 RX ORDER — PANTOPRAZOLE SODIUM 40 MG/10ML
40 INJECTION, POWDER, LYOPHILIZED, FOR SOLUTION INTRAVENOUS EVERY 12 HOURS SCHEDULED
Status: DISCONTINUED | OUTPATIENT
Start: 2024-12-09 | End: 2024-12-10

## 2024-12-09 RX ORDER — SODIUM CHLORIDE 0.9 % (FLUSH) 0.9 %
10 SYRINGE (ML) INJECTION EVERY 12 HOURS SCHEDULED
Status: DISCONTINUED | OUTPATIENT
Start: 2024-12-09 | End: 2024-12-10

## 2024-12-09 RX ORDER — SODIUM CHLORIDE 9 MG/ML
100 INJECTION, SOLUTION INTRAVENOUS CONTINUOUS
Status: DISCONTINUED | OUTPATIENT
Start: 2024-12-09 | End: 2024-12-10

## 2024-12-09 RX ADMIN — PANTOPRAZOLE SODIUM 40 MG: 40 INJECTION, POWDER, FOR SOLUTION INTRAVENOUS at 22:23

## 2024-12-09 RX ADMIN — FAMOTIDINE 20 MG: 10 INJECTION INTRAVENOUS at 22:22

## 2024-12-09 RX ADMIN — Medication 10 ML: at 22:24

## 2024-12-09 RX ADMIN — IOPAMIDOL 90 ML: 755 INJECTION, SOLUTION INTRAVENOUS at 16:49

## 2024-12-09 RX ADMIN — PANTOPRAZOLE SODIUM 40 MG: 40 INJECTION, POWDER, FOR SOLUTION INTRAVENOUS at 15:00

## 2024-12-09 RX ADMIN — SODIUM CHLORIDE 100 ML/HR: 9 INJECTION, SOLUTION INTRAVENOUS at 18:18

## 2024-12-09 NOTE — ED PROVIDER NOTES
Time: 4:49 PM EST  Date of encounter:  2024  Independent Historian/Clinical History and Information was obtained by:   Patient    History is limited by: N/A    Chief Complaint: GI bleeding      History of Present Illness:  Patient is a 64 y.o. year old female who presents to the emergency department for evaluation of GI bleeding.  The patient does report that she has had worsening GI bleeding for the past 24 hours.  Patient reports that initially it was bright red and now has turned black.  Patient has no chest pain or shortness of breath.  Patient has no cough or hemoptysis.  Patient denies abdominal pain.      Patient Care Team  Primary Care Provider: Delfin Bhandari DO    Past Medical History:     Allergies   Allergen Reactions    Shellfish Allergy Anaphylaxis    Shellfish-Derived Products Anaphylaxis     Throat swelling    Penicillins Unknown - High Severity     As baby        Past Medical History:   Diagnosis Date    Allergic     Angina at rest     Anxiety     Bleeding ulcer     Bursitis of left shoulder 2018    CAD (coronary artery disease) 2015    Chronic allergic rhinitis     DDD (degenerative disc disease), lumbar 2016    GERD (gastroesophageal reflux disease) 2016    History of coronary artery stent placement     HLD (hyperlipidemia)     HTN (hypertension)     IBS (irritable bowel syndrome) 2016    MDD (major depressive disorder)     Nondisplaced fracture of proximal end of right fibula 2017    OA (osteoarthritis)     Obesity (BMI 30-39.9)     Seasonal allergies      Past Surgical History:   Procedure Laterality Date    CARDIAC SURGERY       SECTION      COLONOSCOPY      COLONOSCOPY N/A 2022    Procedure: COLONOSCOPY;  Surgeon: Santana Cabrera MD;  Location: [facility] ENDOSCOPY;  Service: Gastroenterology;  Laterality: N/A;  diverticulosis    COLONOSCOPY N/A 10/2/2024    Procedure: COLONOSCOPY WITH BIOPSIES;  Surgeon: Santana Cabrera MD;   Location: [facility] ENDOSCOPY;  Service: Gastroenterology;  Laterality: N/A;  DIVERTICULOSIS    CORONARY ANGIOPLASTY WITH STENT PLACEMENT      ENDOSCOPY N/A 03/01/2022    Procedure: ESOPHAGOGASTRODUODENOSCOPY WITH BIOPSY;  Surgeon: Santana Cabrera MD;  Location: [facility] ENDOSCOPY;  Service: Gastroenterology;  Laterality: N/A;  PREVIOUS SURGERY/GASTRIC EROSIONS    ENDOSCOPY N/A 09/14/2022    Procedure: ESOPHAGOGASTRODUODENOSCOPY;  Surgeon: Santana Cabrera MD;  Location: [facility] ENDOSCOPY;  Service: Gastroenterology;  Laterality: N/A;  PREVIOUS SURGERY    ENDOSCOPY N/A 10/2/2024    Procedure: ESOPHAGOGASTRODUODENOSCOPY WITH BIOPSIES;  Surgeon: Santana Cabrera MD;  Location: [facility] ENDOSCOPY;  Service: Gastroenterology;  Laterality: N/A;  PREVIOUS SURGERY    EYE SURGERY      Implant     GASTRIC BYPASS      HYSTERECTOMY  2009    LAPAROSCOPIC GASTRIC BANDING  2008    SQUAMOUS CELL CARCINOMA EXCISION Right 03/02/2023    Right thumb    TONSILLECTOMY       Family History   Problem Relation Age of Onset    Heart disease Mother     Diabetes Mother     Arthritis Mother     Osteoporosis Mother     Heart disease Father     Diabetes Father     Arthritis Father     Stroke Sister     Heart disease Other     Diabetes Other     Colon cancer Neg Hx        Home Medications:  Prior to Admission medications    Medication Sig Start Date End Date Taking? Authorizing Provider   aspirin 81 MG chewable tablet Chew 1 tablet Daily.   Yes Emergency, Nurse Brunilda, RN   buPROPion XL (WELLBUTRIN XL) 300 MG 24 hr tablet TAKE 1 TABLET BY MOUTH DAILY 12/2/24  Yes Delfin Bhandari DO   carvedilol (COREG) 6.25 MG tablet Take 1 tablet by mouth 2 (Two) Times a Day With Meals. 5/14/23  Yes Delfin Bhandari DO   cetirizine (zyrTEC) 10 MG tablet TAKE 1 TABLET DAILY 1/3/22  Yes Delfin Bhandari DO   clopidogrel (PLAVIX) 75 MG tablet Take 1 tablet by mouth Daily. 11/23/22  Yes Provider, MD Haritha   cyclobenzaprine (FLEXERIL) 10 MG tablet TAKE 1 TABLET  BY MOUTH 3 TIMES  DAILY FOR BACK PAIN  Patient taking differently: Take 1 tablet by mouth 3 (Three) Times a Day As Needed. 11/30/23  Yes Delfin Bhandari DO   estradiol (VIVELLE-DOT) 0.1 MG/24HR patch Place 1 patch on the skin as directed by provider 2 (Two) Times a Week. 9/19/24  Yes Delfin Bhandari DO   ezetimibe-simvastatin (VYTORIN) 10-40 MG per tablet Take 1 tablet by mouth Every Night.   Yes Emergency, Nurse Epic, RN   isosorbide mononitrate (IMDUR) 30 MG 24 hr tablet Take 1 tablet by mouth Daily. 8/9/21  Yes ProviderHaritha MD   nitroglycerin (NITROSTAT) 0.4 MG SL tablet Place 1 tablet under the tongue Every 5 (Five) Minutes As Needed for Chest Pain. Take no more than 3 doses in 15 minutes.   Yes ProviderHaritha MD   ondansetron ODT (ZOFRAN-ODT) 8 MG disintegrating tablet Place 1 tablet on the tongue Every 8 (Eight) Hours As Needed for Nausea or Vomiting. 9/19/24  Yes Delfin Bhandari DO   pantoprazole (PROTONIX) 40 MG EC tablet TAKE 1 TABLET BY MOUTH TWICE  DAILY FOR ESOPHAGUS INFLAMMATION WITH EROSION, STOMACH ULCER  Patient taking differently: Take 1 tablet by mouth 2 (Two) Times a Day. 4/29/24  Yes Delfin Bhandari DO   fluticasone (FLONASE) 50 MCG/ACT nasal spray Administer 2 sprays into the nostril(s) as directed by provider Daily. 9/19/24   Delfin Bhandari DO   Semaglutide,0.25 or 0.5MG/DOS, (OZEMPIC) 2 MG/1.5ML solution pen-injector Inject 0.25 mg under the skin into the appropriate area as directed 1 (One) Time Per Week. SUNDAYS    ProviderHaritha MD   cholestyramine light (Prevalite) 4 g packet Take 1 packet by mouth 2 (Two) Times a Day. 9/19/24 12/9/24  Delfin Bhandari DO   Tirzepatide-Weight Management (ZEPBOUND) 5 MG/0.5ML solution auto-injector Inject 0.5 mL under the skin into the appropriate area as directed 1 (One) Time Per Week. 10/1/24 12/9/24  Delfin Bhandari DO        Social History:   Social History     Tobacco Use    Smoking status: Former     Current packs/day: 0.00     Average  "packs/day: 0.5 packs/day for 25.0 years (12.5 ttl pk-yrs)     Types: Cigarettes     Start date:      Quit date: 2007     Years since quittin.9    Smokeless tobacco: Never    Tobacco comments:     QUIT 15 YEARS AGO   Vaping Use    Vaping status: Never Used   Substance Use Topics    Alcohol use: Yes     Alcohol/week: 2.0 standard drinks of alcohol     Types: 2 Glasses of wine per week     Comment: Occ; has been drinking for 31 or more years    Drug use: Never         Review of Systems:  Review of Systems   Constitutional:  Negative for chills and fever.   HENT:  Negative for congestion, rhinorrhea and sore throat.    Eyes:  Negative for pain and visual disturbance.   Respiratory:  Negative for apnea, cough, chest tightness and shortness of breath.    Cardiovascular:  Negative for chest pain and palpitations.   Gastrointestinal:  Positive for blood in stool. Negative for abdominal pain, diarrhea, nausea and vomiting.   Genitourinary:  Negative for difficulty urinating and dysuria.   Musculoskeletal:  Negative for joint swelling and myalgias.   Skin:  Negative for color change.   Neurological:  Negative for seizures and headaches.   Psychiatric/Behavioral: Negative.     All other systems reviewed and are negative.       Physical Exam:  /79   Pulse 94   Temp 98.3 °F (36.8 °C) (Oral)   Resp 18   Ht 172.7 cm (68\")   Wt 78.5 kg (173 lb 1 oz)   SpO2 98%   BMI 26.31 kg/m²     Physical Exam  Vitals and nursing note reviewed.   Constitutional:       General: She is not in acute distress.     Appearance: Normal appearance. She is not toxic-appearing.   HENT:      Head: Normocephalic and atraumatic.      Jaw: There is normal jaw occlusion.   Eyes:      General: Lids are normal.      Extraocular Movements: Extraocular movements intact.      Conjunctiva/sclera: Conjunctivae normal.      Pupils: Pupils are equal, round, and reactive to light.   Cardiovascular:      Rate and Rhythm: Normal rate and regular " rhythm.      Pulses: Normal pulses.      Heart sounds: Normal heart sounds.   Pulmonary:      Effort: Pulmonary effort is normal. No respiratory distress.      Breath sounds: Normal breath sounds. No wheezing or rhonchi.   Abdominal:      General: Abdomen is flat.      Palpations: Abdomen is soft.      Tenderness: There is no abdominal tenderness. There is no guarding or rebound.   Musculoskeletal:         General: Normal range of motion.      Cervical back: Normal range of motion and neck supple.      Right lower leg: No edema.      Left lower leg: No edema.   Skin:     General: Skin is warm and dry.   Neurological:      Mental Status: She is alert and oriented to person, place, and time. Mental status is at baseline.   Psychiatric:         Mood and Affect: Mood normal.                  Procedures:  Procedures      Medical Decision Making:      Comorbidities that affect care:    Hypertension, diabetes    External Notes reviewed:    Previous Clinic Note: Patient was last seen in clinic for abdominal pain.      The following orders were placed and all results were independently analyzed by me:  Orders Placed This Encounter   Procedures    CT Abdomen Pelvis With Contrast    [place] Draw    Comprehensive Metabolic Panel    Lactic Acid, Plasma    Occult Blood, Fecal By Immunoassay - Stool, Per Rectum    CBC Auto Differential    Comprehensive Metabolic Panel    NPO Diet NPO Type: Strict NPO    Undress & Gown    Measure Blood Pressure    Vital Signs    Orthostatic Blood Pressure    Vital Signs    Activity - Ad Carolynn    Intake & Output    Weigh Patient    Oral Care    Saline Lock & Maintain IV Access    Place Sequential Compression Device    Maintain Sequential Compression Device    Code Status and Medical Interventions: CPR (Attempt to Resuscitate); Full Support    Gastroenterology (on-call MD unless specified)    Inpatient Hospitalist Consult    Pulse Oximetry    Oxygen Therapy- Nasal Cannula; Titrate 1-6 LPM Per SpO2; 90  - 95%    Type & Screen    Insert Peripheral IV    Insert Peripheral IV    Initiate Observation Status    CBC & Differential    Green Top (Gel)    Lavender Top    Gold Top - SST    Light Blue Top    CBC & Differential       Medications Given in the Emergency Department:  Medications   sodium chloride 0.9 % flush 10 mL (has no administration in time range)   pantoprazole (PROTONIX) injection 40 mg (has no administration in time range)   sodium chloride 0.9 % flush 10 mL (has no administration in time range)   sodium chloride 0.9 % flush 10 mL (has no administration in time range)   sodium chloride 0.9 % infusion 40 mL (has no administration in time range)   sennosides-docusate (PERICOLACE) 8.6-50 MG per tablet 2 tablet (has no administration in time range)     And   polyethylene glycol (MIRALAX) packet 17 g (has no administration in time range)     And   bisacodyl (DULCOLAX) EC tablet 5 mg (has no administration in time range)     And   bisacodyl (DULCOLAX) suppository 10 mg (has no administration in time range)   Potassium Replacement - Follow Nurse / BPA Driven Protocol (has no administration in time range)   Magnesium Standard Dose Replacement - Follow Nurse / BPA Driven Protocol (has no administration in time range)   Calcium Replacement - Follow Nurse / BPA Driven Protocol (has no administration in time range)   ondansetron (ZOFRAN) injection 4 mg (has no administration in time range)   melatonin tablet 2.5 mg (has no administration in time range)   famotidine (PEPCID) injection 20 mg (has no administration in time range)   sodium chloride 0.9 % infusion (has no administration in time range)   pantoprazole (PROTONIX) injection 40 mg (40 mg Intravenous Given 12/9/24 1500)   iopamidol (ISOVUE-370) 76 % injection 100 mL (90 mL Intravenous Given 12/9/24 1649)        ED Course:         Labs:    Lab Results (last 24 hours)       Procedure Component Value Units Date/Time    CBC & Differential [[id number]]  (Abnormal)  Collected: 12/09/24 1332    Specimen: Blood from Arm, Left Updated: 12/09/24 1346    Narrative:      The following orders were created for panel order CBC & Differential.  Procedure                               Abnormality         Status                     ---------                               -----------         ------                     CBC Auto Differential[[id number]]        Abnormal            Final result                 Please view results for these tests on the individual orders.    Comprehensive Metabolic Panel [[id number]]  (Abnormal) Collected: 12/09/24 1332    Specimen: Blood from Arm, Left Updated: 12/09/24 1401     Glucose 117 mg/dL      BUN 30 mg/dL      Creatinine 0.62 mg/dL      Sodium 135 mmol/L      Potassium 4.5 mmol/L      Chloride 101 mmol/L      CO2 23.2 mmol/L      Calcium 8.4 mg/dL      Total Protein 6.0 g/dL      Albumin 3.8 g/dL      ALT (SGPT) 23 U/L      AST (SGOT) 19 U/L      Alkaline Phosphatase 61 U/L      Total Bilirubin 0.3 mg/dL      Globulin 2.2 gm/dL      A/G Ratio 1.7 g/dL      BUN/Creatinine Ratio 48.4     Anion Gap 10.8 mmol/L      eGFR 100.8 mL/min/1.73     Narrative:      GFR Normal >60  Chronic Kidney Disease <60  Kidney Failure <15      Lactic Acid, Plasma [[id number]]  (Normal) Collected: 12/09/24 1332    Specimen: Blood from Arm, Left Updated: 12/09/24 1357     Lactate 1.3 mmol/L     CBC Auto Differential [[id number]]  (Abnormal) Collected: 12/09/24 1332    Specimen: Blood from Arm, Left Updated: 12/09/24 1346     WBC 14.97 10*3/mm3      RBC 3.03 10*6/mm3      Hemoglobin 9.1 g/dL      Hematocrit 28.5 %      MCV 94.1 fL      MCH 30.0 pg      MCHC 31.9 g/dL      RDW 12.4 %      RDW-SD 42.6 fl      MPV 10.3 fL      Platelets 305 10*3/mm3      Neutrophil % 68.8 %      Lymphocyte % 23.7 %      Monocyte % 6.3 %      Eosinophil % 0.4 %      Basophil % 0.3 %      Immature Grans % 0.5 %      Neutrophils, Absolute 10.29 10*3/mm3      Lymphocytes, Absolute 3.55 10*3/mm3       Monocytes, Absolute 0.95 10*3/mm3      Eosinophils, Absolute 0.06 10*3/mm3      Basophils, Absolute 0.05 10*3/mm3      Immature Grans, Absolute 0.07 10*3/mm3      nRBC 0.0 /100 WBC     Occult Blood, Fecal By Immunoassay - Stool, Per Rectum [[id number]]  (Abnormal) Collected: 12/09/24 1503    Specimen: Stool from Per Rectum Updated: 12/09/24 1516     Occult Blood, Fecal by Immunoassay Positive             Imaging:    No Radiology Exams Resulted Within Past 24 Hours      Differential Diagnosis and Discussion:    GI Bleeding: Differential diagnosis includes but is not limited to gastritis, gastric ulcer, stress ulcer, duodenitis, Livier-Chin tears, esophageal varices, angiodysplasia, aortic enteric fistula, hematologic issues including thrombocytopenia, GI neoplasm, ulcerative colitis, Crohn's disease, diverticulosis, diverticulitis, hemorrhoids, aortic aneurysm, and polyps    All labs were reviewed and interpreted by me.  All X-rays impressions were independently interpreted by me.    MDM     The patient´s CBC that was reviewed and interpreted by me shows no abnormalities of critical concern. Of note, there is no anemia requiring a blood transfusion and the platelet count is acceptable.  The patient´s CMP that was reviewed and interpretted by me shows no abnormalities of critical concern. Of note, the patient´s sodium and potassium are acceptable. The patient´s liver enzymes are unremarkable. The patient´s renal function (creatinine) is preserved. The patient has a normal anion gap.  Occult blood is positive.                Patient Care Considerations:    I considered starting antibiotics, however no bacterial focus of infection was found.      Consultants/Shared Management Plan:    Case was discussed with Dr. Gomes who agrees to consult.  Case was discussed with Dr. Cruz who agrees to admit the patient.    Social Determinants of Health:    Patient is independent, reliable, and has access to care.        Disposition and Care Coordination:    Admit:   Through independent evaluation of the patient's history, physical, and imperical data, the patient meets criteria for inpatient admission to the hospital.        Final diagnoses:   Gastrointestinal hemorrhage, unspecified gastrointestinal hemorrhage type        ED Disposition       ED Disposition   Decision to Admit    Condition   --    Comment   Level of Care: Telemetry [5]   Diagnosis: Rectal bleeding [[id number]]   Admitting Physician: DANIEL HURST [[id number]]   Attending Physician: DANIEL HURST [[id number]]                 This medical record created using voice recognition software.             Art Kaye MD  12/09/24 3636

## 2024-12-09 NOTE — PLAN OF CARE
Goal Outcome Evaluation:  Plan of Care Reviewed With: patient        Progress: no change  Outcome Evaluation: AOx4, vss, NPO until seen by GI, pt able to ambulate independently.

## 2024-12-09 NOTE — H&P
[facility]   HISTORY AND PHYSICAL    Patient Name: Viviana Hodges  : 1960  MRN: [id number]  Primary Care Physician:  Delfin Bhandari DO  Date of admission: 2024    Subjective   Subjective     Chief Complaint: rectal bleeding     History of Present Illness  This is a 64-year-old female with past medical history of hypertension, hyperlipidemia, IBS, CAD presents to the ED with complaints of rectal bleeding.  Patient states that she has had multiple episodes of rectal bleeding and has followed up with GI multiple times.  She states that her last colonoscopy was approximately 2 months ago.  She also reports that she has been on Carafate and Protonix.  She was advised to take any Advil.  She states that approximately 4 days ago she had Advil cold and flu.  She attributes this episode to taking the Advil cold and flu.  She denies any fevers, chills, diaphoresis, chest pain, shortness of breath, nausea, vomiting, constipation or diarrhea.  She was admitted for further evaluation and management  Rectal Bleeding  Symptoms include nausea.    Pertinent negative symptoms include no abdominal pain, no chest pain, no chills, no fatigue, no fever, no numbness, no sore throat, no dysuria and no vomiting.   Hypotension  Symptoms include nausea.    Pertinent negative symptoms include no abdominal pain, no chest pain, no chills, no fatigue, no fever, no numbness, no sore throat, no dysuria and no vomiting.   Dizziness  Symptoms include nausea.    Pertinent negative symptoms include no abdominal pain, no chest pain, no chills, no fatigue, no fever, no numbness, no sore throat, no dysuria and no vomiting.   Weakness - Generalized  Symptoms include nausea.    Pertinent negative symptoms include no abdominal pain, no chest pain, no chills, no fatigue, no fever, no numbness, no sore throat, no dysuria and no vomiting.   Nausea  Symptoms include nausea.    Pertinent negative symptoms include no abdominal pain, no  chest pain, no chills, no fatigue, no fever, no numbness, no sore throat, no dysuria and no vomiting.       Review of Systems   Constitutional:  Negative for activity change, appetite change, chills, fatigue and fever.   HENT:  Negative for drooling, facial swelling, postnasal drip and sore throat.    Respiratory:  Negative for shortness of breath.    Cardiovascular:  Negative for chest pain.   Gastrointestinal:  Positive for hematochezia and nausea. Negative for abdominal distention, abdominal pain and vomiting.   Endocrine: Negative for polydipsia and polyphagia.   Genitourinary:  Negative for dysuria and hematuria.   Musculoskeletal:  Negative for gait problem and joint swelling.   Neurological:  Positive for dizziness. Negative for light-headedness and numbness.   Psychiatric/Behavioral:  Negative for agitation and behavioral problems.         Personal History     Past Medical History:   Diagnosis Date    Allergic     Angina at rest     Anxiety     Bleeding ulcer     Bursitis of left shoulder 2018    CAD (coronary artery disease) 2015    Chronic allergic rhinitis     DDD (degenerative disc disease), lumbar 2016    GERD (gastroesophageal reflux disease) 2016    History of coronary artery stent placement     HLD (hyperlipidemia)     HTN (hypertension)     IBS (irritable bowel syndrome) 2016    MDD (major depressive disorder)     Nondisplaced fracture of proximal end of right fibula 2017    OA (osteoarthritis)     Obesity (BMI 30-39.9)     Seasonal allergies        Past Surgical History:   Procedure Laterality Date    CARDIAC SURGERY       SECTION      COLONOSCOPY      COLONOSCOPY N/A 2022    Procedure: COLONOSCOPY;  Surgeon: Santana Cabrera MD;  Location: [facility] ENDOSCOPY;  Service: Gastroenterology;  Laterality: N/A;  diverticulosis    COLONOSCOPY N/A 10/2/2024    Procedure: COLONOSCOPY WITH BIOPSIES;  Surgeon: Santana Cabrera MD;  Location: [facility]  ENDOSCOPY;  Service: Gastroenterology;  Laterality: N/A;  DIVERTICULOSIS    CORONARY ANGIOPLASTY WITH STENT PLACEMENT      ENDOSCOPY N/A 03/01/2022    Procedure: ESOPHAGOGASTRODUODENOSCOPY WITH BIOPSY;  Surgeon: Santana Cabrera MD;  Location: [facility] ENDOSCOPY;  Service: Gastroenterology;  Laterality: N/A;  PREVIOUS SURGERY/GASTRIC EROSIONS    ENDOSCOPY N/A 09/14/2022    Procedure: ESOPHAGOGASTRODUODENOSCOPY;  Surgeon: Santana Cabrera MD;  Location: [facility] ENDOSCOPY;  Service: Gastroenterology;  Laterality: N/A;  PREVIOUS SURGERY    ENDOSCOPY N/A 10/2/2024    Procedure: ESOPHAGOGASTRODUODENOSCOPY WITH BIOPSIES;  Surgeon: Santana Cabrera MD;  Location: [facility] ENDOSCOPY;  Service: Gastroenterology;  Laterality: N/A;  PREVIOUS SURGERY    EYE SURGERY      Implant     GASTRIC BYPASS      HYSTERECTOMY  2009    LAPAROSCOPIC GASTRIC BANDING  2008    SQUAMOUS CELL CARCINOMA EXCISION Right 03/02/2023    Right thumb    TONSILLECTOMY         Family History: family history includes Arthritis in her father and mother; Diabetes in her father, mother, and another family member; Heart disease in her father, mother, and another family member; Osteoporosis in her mother; Stroke in her sister. Otherwise pertinent FHx was reviewed and not pertinent to current issue.    Social History:  reports that she quit smoking about 17 years ago. Her smoking use included cigarettes. She started smoking about 42 years ago. She has a 12.5 pack-year smoking history. She has never used smokeless tobacco. She reports current alcohol use of about 2.0 standard drinks of alcohol per week. She reports that she does not use drugs.    Home Medications:  Semaglutide(0.25 or 0.5MG/DOS), aspirin, buPROPion XL, carvedilol, cetirizine, clopidogrel, cyclobenzaprine, estradiol, ezetimibe-simvastatin, fluticasone, isosorbide mononitrate, nitroglycerin, ondansetron ODT, and pantoprazole    Allergies:  Allergies   Allergen Reactions    Shellfish Allergy  Anaphylaxis    Shellfish-Derived Products Anaphylaxis     Throat swelling    Penicillins Unknown - High Severity     As baby          Objective    Objective     Vitals:   Temp:  [98.3 °F (36.8 °C)] 98.3 °F (36.8 °C)  Heart Rate:  [] 94  Resp:  [18] 18  BP: ()/(61-75) 114/61    Physical Exam  Constitutional:       General: She is not in acute distress.     Appearance: She is not ill-appearing.   HENT:      Head: Normocephalic and atraumatic.      Nose: No rhinorrhea.      Mouth/Throat:      Mouth: Mucous membranes are moist.      Pharynx: Oropharynx is clear.   Eyes:      Extraocular Movements: Extraocular movements intact.      Pupils: Pupils are equal, round, and reactive to light.   Cardiovascular:      Rate and Rhythm: Normal rate and regular rhythm.      Heart sounds: No murmur heard.  Pulmonary:      Effort: Pulmonary effort is normal. No respiratory distress.      Breath sounds: Normal breath sounds. No wheezing.   Chest:      Chest wall: No tenderness.   Abdominal:      General: Abdomen is flat. Bowel sounds are normal. There is no distension.      Palpations: Abdomen is soft.      Tenderness: There is no abdominal tenderness. There is no guarding.   Musculoskeletal:         General: No swelling or tenderness.      Cervical back: Normal range of motion.   Skin:     General: Skin is warm and dry.   Neurological:      General: No focal deficit present.      Mental Status: She is alert and oriented to person, place, and time.   Psychiatric:         Mood and Affect: Mood normal.         Result Review    Result Review:  I have personally reviewed the results from the time of this admission to 12/9/2024 16:07 EST and agree with these findings:  []  Laboratory list / accordion  []  Microbiology  []  Radiology  []  EKG/Telemetry   []  Cardiology/Vascular   []  Pathology  []  Old records  []  Other:  Most notable findings include:       Assessment & Plan   Assessment / Plan     Brief Patient  Summary:  Viviana Hodges is a 64 y.o. female who presented to the ED with complaints of rectal bleeding.     Active Hospital Problems:  Active Hospital Problems    Diagnosis     **Rectal bleeding      Plan:   GI bleed:   Hgb 9.1  Admitted to telemetry  Telemetry monitoring  N.p.o. for now  Continue with IV fluids and normal saline at 100 mL/h  Serial H&H  Consulted GI, recommendations welcome  Daily CBC and CMP, will continue to monitor  Will continue to monitor vitals     Hx of HTN:   Will continue home medications      DVT prophylaxis: SCDs for now   GI prophylaxis: Zofran, Protonix  CODE STATUS: CPR (full code)      VTE Prophylaxis:  Mechanical VTE prophylaxis orders are present.        CODE STATUS:    Level Of Support Discussed With: Patient  Code Status (Patient has no pulse and is not breathing): CPR (Attempt to Resuscitate)  Medical Interventions (Patient has pulse or is breathing): Full Support    Admission Status:  I believe this patient meets FULL CODE status.    Aubrie Cruz MD

## 2024-12-10 ENCOUNTER — ANESTHESIA (OUTPATIENT)
Dept: GASTROENTEROLOGY | Facility: HOSPITAL | Age: 64
End: 2024-12-10
Payer: COMMERCIAL

## 2024-12-10 ENCOUNTER — ANESTHESIA EVENT (OUTPATIENT)
Dept: GASTROENTEROLOGY | Facility: HOSPITAL | Age: 64
End: 2024-12-10
Payer: COMMERCIAL

## 2024-12-10 LAB
ALBUMIN SERPL-MCNC: 3.2 G/DL (ref 3.5–5.2)
ALBUMIN/GLOB SERPL: 1.7 G/DL
ALP SERPL-CCNC: 52 U/L (ref 39–117)
ALT SERPL W P-5'-P-CCNC: 17 U/L (ref 1–33)
ANION GAP SERPL CALCULATED.3IONS-SCNC: 6.6 MMOL/L (ref 5–15)
AST SERPL-CCNC: 17 U/L (ref 1–32)
BASOPHILS # BLD AUTO: 0.05 10*3/MM3 (ref 0–0.2)
BASOPHILS NFR BLD AUTO: 0.5 % (ref 0–1.5)
BILIRUB SERPL-MCNC: 0.3 MG/DL (ref 0–1.2)
BUN SERPL-MCNC: 22 MG/DL (ref 8–23)
BUN/CREAT SERPL: 41.5 (ref 7–25)
CALCIUM SPEC-SCNC: 7.9 MG/DL (ref 8.6–10.5)
CHLORIDE SERPL-SCNC: 106 MMOL/L (ref 98–107)
CO2 SERPL-SCNC: 24.4 MMOL/L (ref 22–29)
CREAT SERPL-MCNC: 0.53 MG/DL (ref 0.57–1)
DEPRECATED RDW RBC AUTO: 43.3 FL (ref 37–54)
EGFRCR SERPLBLD CKD-EPI 2021: 104.7 ML/MIN/1.73
EOSINOPHIL # BLD AUTO: 0.12 10*3/MM3 (ref 0–0.4)
EOSINOPHIL NFR BLD AUTO: 1.3 % (ref 0.3–6.2)
ERYTHROCYTE [DISTWIDTH] IN BLOOD BY AUTOMATED COUNT: 12.5 % (ref 12.3–15.4)
FERRITIN SERPL-MCNC: 66.19 NG/ML (ref 13–150)
GLOBULIN UR ELPH-MCNC: 1.9 GM/DL
GLUCOSE SERPL-MCNC: 109 MG/DL (ref 65–99)
HCT VFR BLD AUTO: 24 % (ref 34–46.6)
HCT VFR BLD AUTO: 25.4 % (ref 34–46.6)
HGB BLD-MCNC: 7.7 G/DL (ref 12–15.9)
HGB BLD-MCNC: 8.1 G/DL (ref 12–15.9)
IMM GRANULOCYTES # BLD AUTO: 0.05 10*3/MM3 (ref 0–0.05)
IMM GRANULOCYTES NFR BLD AUTO: 0.5 % (ref 0–0.5)
IRON 24H UR-MRATE: 129 MCG/DL (ref 37–145)
IRON SATN MFR SERPL: 39 % (ref 20–50)
LYMPHOCYTES # BLD AUTO: 3.29 10*3/MM3 (ref 0.7–3.1)
LYMPHOCYTES NFR BLD AUTO: 34.9 % (ref 19.6–45.3)
MCH RBC QN AUTO: 30.3 PG (ref 26.6–33)
MCHC RBC AUTO-ENTMCNC: 32.1 G/DL (ref 31.5–35.7)
MCV RBC AUTO: 94.5 FL (ref 79–97)
MONOCYTES # BLD AUTO: 0.72 10*3/MM3 (ref 0.1–0.9)
MONOCYTES NFR BLD AUTO: 7.6 % (ref 5–12)
NEUTROPHILS NFR BLD AUTO: 5.19 10*3/MM3 (ref 1.7–7)
NEUTROPHILS NFR BLD AUTO: 55.2 % (ref 42.7–76)
NRBC BLD AUTO-RTO: 0 /100 WBC (ref 0–0.2)
PLATELET # BLD AUTO: 232 10*3/MM3 (ref 140–450)
PMV BLD AUTO: 10.4 FL (ref 6–12)
POTASSIUM SERPL-SCNC: 3.9 MMOL/L (ref 3.5–5.2)
PROT SERPL-MCNC: 5.1 G/DL (ref 6–8.5)
RBC # BLD AUTO: 2.54 10*6/MM3 (ref 3.77–5.28)
SODIUM SERPL-SCNC: 137 MMOL/L (ref 136–145)
TIBC SERPL-MCNC: 332 MCG/DL (ref 298–536)
TRANSFERRIN SERPL-MCNC: 223 MG/DL (ref 200–360)
WBC NRBC COR # BLD AUTO: 9.42 10*3/MM3 (ref 3.4–10.8)

## 2024-12-10 PROCEDURE — 25010000002 PROPOFOL 10 MG/ML EMULSION: Performed by: NURSE ANESTHETIST, CERTIFIED REGISTERED

## 2024-12-10 PROCEDURE — 85018 HEMOGLOBIN: CPT | Performed by: INTERNAL MEDICINE

## 2024-12-10 PROCEDURE — 85014 HEMATOCRIT: CPT | Performed by: INTERNAL MEDICINE

## 2024-12-10 PROCEDURE — G0378 HOSPITAL OBSERVATION PER HR: HCPCS

## 2024-12-10 PROCEDURE — 82728 ASSAY OF FERRITIN: CPT | Performed by: INTERNAL MEDICINE

## 2024-12-10 PROCEDURE — 99214 OFFICE O/P EST MOD 30 MIN: CPT | Performed by: INTERNAL MEDICINE

## 2024-12-10 PROCEDURE — 99233 SBSQ HOSP IP/OBS HIGH 50: CPT | Performed by: INTERNAL MEDICINE

## 2024-12-10 PROCEDURE — 84466 ASSAY OF TRANSFERRIN: CPT | Performed by: INTERNAL MEDICINE

## 2024-12-10 PROCEDURE — 43235 EGD DIAGNOSTIC BRUSH WASH: CPT | Performed by: INTERNAL MEDICINE

## 2024-12-10 PROCEDURE — 80053 COMPREHEN METABOLIC PANEL: CPT | Performed by: FAMILY MEDICINE

## 2024-12-10 PROCEDURE — 25010000002 LIDOCAINE PF 2% 2 % SOLUTION: Performed by: NURSE ANESTHETIST, CERTIFIED REGISTERED

## 2024-12-10 PROCEDURE — 0DJ08ZZ INSPECTION OF UPPER INTESTINAL TRACT, VIA NATURAL OR ARTIFICIAL OPENING ENDOSCOPIC: ICD-10-PCS | Performed by: INTERNAL MEDICINE

## 2024-12-10 PROCEDURE — 25810000003 SODIUM CHLORIDE 0.9 % SOLUTION: Performed by: FAMILY MEDICINE

## 2024-12-10 PROCEDURE — 83540 ASSAY OF IRON: CPT | Performed by: INTERNAL MEDICINE

## 2024-12-10 PROCEDURE — 96361 HYDRATE IV INFUSION ADD-ON: CPT

## 2024-12-10 PROCEDURE — 85025 COMPLETE CBC W/AUTO DIFF WBC: CPT | Performed by: FAMILY MEDICINE

## 2024-12-10 RX ORDER — PANTOPRAZOLE SODIUM 40 MG/1
40 TABLET, DELAYED RELEASE ORAL
Status: DISCONTINUED | OUTPATIENT
Start: 2024-12-10 | End: 2024-12-10

## 2024-12-10 RX ORDER — LIDOCAINE HYDROCHLORIDE 20 MG/ML
INJECTION, SOLUTION EPIDURAL; INFILTRATION; INTRACAUDAL; PERINEURAL AS NEEDED
Status: DISCONTINUED | OUTPATIENT
Start: 2024-12-10 | End: 2024-12-10 | Stop reason: SURG

## 2024-12-10 RX ORDER — PANTOPRAZOLE SODIUM 40 MG/1
40 TABLET, DELAYED RELEASE ORAL
Status: DISCONTINUED | OUTPATIENT
Start: 2024-12-10 | End: 2024-12-11 | Stop reason: HOSPADM

## 2024-12-10 RX ORDER — SODIUM CHLORIDE, SODIUM LACTATE, POTASSIUM CHLORIDE, CALCIUM CHLORIDE 600; 310; 30; 20 MG/100ML; MG/100ML; MG/100ML; MG/100ML
30 INJECTION, SOLUTION INTRAVENOUS CONTINUOUS
Status: DISCONTINUED | OUTPATIENT
Start: 2024-12-10 | End: 2024-12-10

## 2024-12-10 RX ORDER — BUPROPION HYDROCHLORIDE 150 MG/1
300 TABLET ORAL DAILY
Status: DISCONTINUED | OUTPATIENT
Start: 2024-12-10 | End: 2024-12-11 | Stop reason: HOSPADM

## 2024-12-10 RX ADMIN — PROPOFOL 70 MCG/KG/MIN: 10 INJECTION, EMULSION INTRAVENOUS at 11:58

## 2024-12-10 RX ADMIN — LIDOCAINE HYDROCHLORIDE 70 MG: 20 INJECTION, SOLUTION EPIDURAL; INFILTRATION; INTRACAUDAL; PERINEURAL at 11:58

## 2024-12-10 RX ADMIN — PANTOPRAZOLE SODIUM 40 MG: 40 TABLET, DELAYED RELEASE ORAL at 17:53

## 2024-12-10 RX ADMIN — BUPROPION HYDROCHLORIDE 300 MG: 150 TABLET, EXTENDED RELEASE ORAL at 09:26

## 2024-12-10 RX ADMIN — SODIUM CHLORIDE 100 ML/HR: 9 INJECTION, SOLUTION INTRAVENOUS at 06:09

## 2024-12-10 RX ADMIN — PANTOPRAZOLE SODIUM 40 MG: 40 TABLET, DELAYED RELEASE ORAL at 09:26

## 2024-12-10 NOTE — ANESTHESIA POSTPROCEDURE EVALUATION
Patient: Viviana Hodges    Procedure Summary       Date: 12/10/24 Room / Location: [facility] ENDOSCOPY 2 / [facility] ENDOSCOPY    Anesthesia Start: 1156 Anesthesia Stop: 1218    Procedure: ESOPHAGOGASTRODUODENOSCOPY Diagnosis:       GI bleed      (GI bleed [K92.2])    Surgeons: Fady Schneider MD Provider: Tre Mejía CRNA    Anesthesia Type: general ASA Status: 3            Anesthesia Type: general    Vitals  Vitals Value Taken Time   /65 12/10/24 1233   Temp 37.4 °C (99.3 °F) 12/10/24 1218   Pulse 71 12/10/24 1234   Resp 15 12/10/24 1232   SpO2 100 % 12/10/24 1233   Vitals shown include unfiled device data.        Post Anesthesia Care and Evaluation    Post-procedure mental status: acceptable.  Pain management: satisfactory to patient    Airway patency: patent  Anesthetic complications: No anesthetic complications    Cardiovascular status: acceptable  Respiratory status: acceptable    Comments: Per chart review

## 2024-12-10 NOTE — PLAN OF CARE
Goal Outcome Evaluation:  Plan of Care Reviewed With: patient        Progress: no change  Outcome Evaluation: Patient up ad jeet. No c/o pain or nausea through the night. IV fluids continue. Patient has remained NPO. No acute changes overnight.

## 2024-12-10 NOTE — ANESTHESIA PREPROCEDURE EVALUATION
Anesthesia Evaluation     Patient summary reviewed and Nursing notes reviewed   NPO Solid Status: > 8 hours  NPO Liquid Status: > 8 hours           Airway   Mallampati: I  TM distance: >3 FB  Neck ROM: full  No difficulty expected  Dental - normal exam     Pulmonary - normal exam    breath sounds clear to auscultation  Cardiovascular - normal exam  Exercise tolerance: good (4-7 METS)    ECG reviewed  PT is on anticoagulation therapy  Rhythm: regular  Rate: normal    (+) hypertension well controlled, CAD, cardiac stents (2019) more than 12 months ago , angina, hyperlipidemia      Neuro/Psych  (+) psychiatric history Anxiety and Depression  GI/Hepatic/Renal/Endo    (+) obesity, GERD, PUD, GI bleeding     Musculoskeletal     Abdominal    Substance History      OB/GYN          Other   arthritis,     ROS/Med Hx Other: GI bleed  Labs:     12/10/24 04:08  WBC: 9.42  RBC: 2.54 (L)  Hemoglobin: 7.7 (L)  Hematocrit: 24.0 (L)    Last dose Ozempic  12/01    SPECT test 09/13/23: HR 72, SR, EF 89% normal systolic wall motion     EKG 03/02/22: HR 61,   Sinus rhythm  Low voltage, precordial leads  No previous ECG available for comparison    Last dose Plavix 12/09    Recent EGD 2 mos ago for GI bleed                            Anesthesia Plan    ASA 3     general   total IV anesthesia  (Total IV Anesthesia    Patient understands anesthesia not responsible for dental damage.      Discussed risks with pt including aspiration, allergic reactions, apnea, advanced airway placement. Pt verbalized understanding. All questions answered.     )  intravenous induction     Anesthetic plan, risks, benefits, and alternatives have been provided, discussed and informed consent has been obtained with: patient.  Pre-procedure education provided  Plan discussed with CRNA.      CODE STATUS:    Level Of Support Discussed With: Patient  Code Status (Patient has no pulse and is not breathing): CPR (Attempt to Resuscitate)  Medical Interventions (Patient  has pulse or is breathing): Full Support

## 2024-12-10 NOTE — CONSULTS
[facility]  Initial Inpatient Consult Note    Referring Provider: Hospitalist    Reason for Consultation: Melena, prior gastric bypass, NSAID use    Subjective     History of present illness:    64 y.o. female with a history of lap band and subsequent gastric bypass in 2019 admitted yesterday after having a few episodes of melena at home.  She had a recent upper respiratory infection and had been taking Advil Cold and Sinus multiple times per day over the past week.  She had a gastric bypass in 2019.  She has had troubles with recurrent chronic anemia a few years ago while taking Brilinta but that seemed to resolve after a change to Plavix.  She has a history of coronary artery disease with prior stents.  On arrival here her hemoglobin was 9.1 and decreased to 7.7 this morning.  She had a recent EGD and colonoscopy reviewed per Dr. Cabrera    Past Medical History:  Past Medical History:   Diagnosis Date    Allergic     Angina at rest     Anxiety     Bleeding ulcer     Bursitis of left shoulder 2018    CAD (coronary artery disease) 2015    Chronic allergic rhinitis     DDD (degenerative disc disease), lumbar 2016    GERD (gastroesophageal reflux disease) 2016    History of coronary artery stent placement     HLD (hyperlipidemia)     HTN (hypertension)     IBS (irritable bowel syndrome) 2016    MDD (major depressive disorder)     Nondisplaced fracture of proximal end of right fibula 2017    OA (osteoarthritis)     Obesity (BMI 30-39.9)     Seasonal allergies      Past Surgical History:  Past Surgical History:   Procedure Laterality Date    CARDIAC SURGERY       SECTION      COLONOSCOPY      COLONOSCOPY N/A 2022    Procedure: COLONOSCOPY;  Surgeon: Santana Cabrera MD;  Location: [facility] ENDOSCOPY;  Service: Gastroenterology;  Laterality: N/A;  diverticulosis    COLONOSCOPY N/A 10/2/2024    Procedure: COLONOSCOPY WITH BIOPSIES;  Surgeon:  Santana Cabrera MD;  Location: [facility] ENDOSCOPY;  Service: Gastroenterology;  Laterality: N/A;  DIVERTICULOSIS    CORONARY ANGIOPLASTY WITH STENT PLACEMENT      ENDOSCOPY N/A 2022    Procedure: ESOPHAGOGASTRODUODENOSCOPY WITH BIOPSY;  Surgeon: Santana Cabrera MD;  Location: [facility] ENDOSCOPY;  Service: Gastroenterology;  Laterality: N/A;  PREVIOUS SURGERY/GASTRIC EROSIONS    ENDOSCOPY N/A 2022    Procedure: ESOPHAGOGASTRODUODENOSCOPY;  Surgeon: Santana Cabrera MD;  Location: [facility] ENDOSCOPY;  Service: Gastroenterology;  Laterality: N/A;  PREVIOUS SURGERY    ENDOSCOPY N/A 10/2/2024    Procedure: ESOPHAGOGASTRODUODENOSCOPY WITH BIOPSIES;  Surgeon: Santana Cabrera MD;  Location: [facility] ENDOSCOPY;  Service: Gastroenterology;  Laterality: N/A;  PREVIOUS SURGERY    EYE SURGERY      Implant     GASTRIC BYPASS      HYSTERECTOMY  2009    LAPAROSCOPIC GASTRIC BANDING  2008    SQUAMOUS CELL CARCINOMA EXCISION Right 2023    Right thumb    TONSILLECTOMY        Social History:   Social History     Tobacco Use    Smoking status: Former     Current packs/day: 0.00     Average packs/day: 0.5 packs/day for 25.0 years (12.5 ttl pk-yrs)     Types: Cigarettes     Start date:      Quit date:      Years since quittin.9    Smokeless tobacco: Never    Tobacco comments:     QUIT 15 YEARS AGO   Substance Use Topics    Alcohol use: Yes     Alcohol/week: 2.0 standard drinks of alcohol     Types: 2 Glasses of wine per week     Comment: Occ; has been drinking for 31 or more years      Family History:  Family History   Problem Relation Age of Onset    Heart disease Mother     Diabetes Mother     Arthritis Mother     Osteoporosis Mother     Heart disease Father     Diabetes Father     Arthritis Father     Stroke Sister     Heart disease Other     Diabetes Other     Colon cancer Neg Hx        Home Meds:  Medications Prior to Admission   Medication Sig Dispense Refill Last Dose/Taking    aspirin 81  MG chewable tablet Chew 1 tablet Daily.   12/9/2024    buPROPion XL (WELLBUTRIN XL) 300 MG 24 hr tablet TAKE 1 TABLET BY MOUTH DAILY 90 tablet 3 12/9/2024    carvedilol (COREG) 6.25 MG tablet Take 1 tablet by mouth 2 (Two) Times a Day With Meals. 180 tablet 3 12/8/2024    cetirizine (zyrTEC) 10 MG tablet TAKE 1 TABLET DAILY 90 tablet 3 12/9/2024    clopidogrel (PLAVIX) 75 MG tablet Take 1 tablet by mouth Daily.   12/9/2024    cyclobenzaprine (FLEXERIL) 10 MG tablet TAKE 1 TABLET BY MOUTH 3 TIMES  DAILY FOR BACK PAIN (Patient taking differently: Take 1 tablet by mouth 3 (Three) Times a Day As Needed.) 270 tablet 3 Taking Differently    estradiol (VIVELLE-DOT) 0.1 MG/24HR patch Place 1 patch on the skin as directed by provider 2 (Two) Times a Week. 8 each 11 Taking    ezetimibe-simvastatin (VYTORIN) 10-40 MG per tablet Take 1 tablet by mouth Every Night.   12/8/2024    isosorbide mononitrate (IMDUR) 30 MG 24 hr tablet Take 1 tablet by mouth Daily.   12/9/2024    nitroglycerin (NITROSTAT) 0.4 MG SL tablet Place 1 tablet under the tongue Every 5 (Five) Minutes As Needed for Chest Pain. Take no more than 3 doses in 15 minutes.   Taking As Needed    ondansetron ODT (ZOFRAN-ODT) 8 MG disintegrating tablet Place 1 tablet on the tongue Every 8 (Eight) Hours As Needed for Nausea or Vomiting. 30 tablet 2 Taking As Needed    pantoprazole (PROTONIX) 40 MG EC tablet TAKE 1 TABLET BY MOUTH TWICE  DAILY FOR ESOPHAGUS INFLAMMATION WITH EROSION, STOMACH ULCER (Patient taking differently: Take 1 tablet by mouth 2 (Two) Times a Day.) 180 tablet 3 12/9/2024    fluticasone (FLONASE) 50 MCG/ACT nasal spray Administer 2 sprays into the nostril(s) as directed by provider Daily. 11.1 mL 5     Semaglutide,0.25 or 0.5MG/DOS, (OZEMPIC) 2 MG/1.5ML solution pen-injector Inject 0.25 mg under the skin into the appropriate area as directed 1 (One) Time Per Week. SUNDAYS   12/1/2024     Current Meds:   buPROPion XL, 300 mg, Oral, Daily  pantoprazole,  "40 mg, Oral, Q AM      Allergies:  Allergies   Allergen Reactions    Shellfish Allergy Anaphylaxis    Shellfish-Derived Products Anaphylaxis     Throat swelling    Penicillins Unknown - High Severity     As baby        Review of Systems  Pertinent items are noted in HPI, all other systems reviewed and negative         Vital Signs  Temp:  [97.9 °F (36.6 °C)-98.3 °F (36.8 °C)] 98.3 °F (36.8 °C)  Heart Rate:  [] 73  Resp:  [13-18] 13  BP: ()/(54-79) 123/72  Physical Exam:  General Appearance:    Alert, cooperative, in no acute distress   Head:    Normocephalic, without obvious abnormality, atraumatic   Eyes:          conjunctivae and sclerae normal, no   icterus   Throat:   no thrush, oral mucosa moist   Neck:   Supple, no adenopathy   Lungs:     Clear to auscultation bilaterally    Heart:    Regular rhythm and normal rate    Chest Wall:    No abnormalities observed   Abdomen:     Soft, nondistended, nontender; normal bowel sounds   Extremities:   no edema, no redness   Skin:   No bruising or rash   Psychiatric:  normal mood and insight     Results Review:  [x]  Laboratory   [x]  Radiology  []  Pathology      I reviewed the patient's new clinical results.    Results from last 7 days   Lab Units 12/10/24  0408 12/09/24  1332   WBC 10*3/mm3 9.42 14.97*   HEMOGLOBIN g/dL 7.7* 9.1*   HEMATOCRIT % 24.0* 28.5*   PLATELETS 10*3/mm3 232 305     Results from last 7 days   Lab Units 12/10/24  0408 12/09/24  1332   SODIUM mmol/L 137 135*   POTASSIUM mmol/L 3.9 4.5   CHLORIDE mmol/L 106 101   CO2 mmol/L 24.4 23.2   BUN mg/dL 22 30*   CREATININE mg/dL 0.53* 0.62   CALCIUM mg/dL 7.9* 8.4*   BILIRUBIN mg/dL 0.3 0.3   ALK PHOS U/L 52 61   ALT (SGPT) U/L 17 23   AST (SGOT) U/L 17 19   GLUCOSE mg/dL 109* 117*         No results found for: \"LIPASE\"    Radiology:  CT Abdomen Pelvis With Contrast    Result Date: 12/9/2024  Impression: 1.No acute abnormality identified within the abdomen or pelvis. 2.Colonic diverticulosis. " 3.Additional findings as detailed above. Electronically Signed: Josh Rodriguez MD  12/9/2024 5:22 PM EST  Workstation ID: [id number]     Assessment & Plan       Rectal bleeding    GI bleed  NSAID use  Prior gastric bypass  Acute blood loss anemia    Plan:  I will schedule patient for an upper endoscopy later today.  Discussed risk and benefits of EGD and she is willing to proceed.  I suspect in the setting of Plavix the recent NSAID use has caused an ulcer.  Her Plavix has been on hold and she is on a PPI overnight      I discussed the patients findings and my recommendations with patient and nursing staff.    Fady Schneider MD

## 2024-12-10 NOTE — PROGRESS NOTES
[facility]   Hospitalist Progress Note  Date: 12/10/2024  Patient Name: Viviana Hodges  : 1960  MRN: [id number]  Date of admission: 2024      Subjective   Subjective     Chief Complaint: Follow up for rectal bleeding    Summary: 64 year old F with CAD with prior stenting, HTN, HLD, IBS, gastric bypass 2019, diverticulosis, chronic anemia who presented with rectal bleeding.  Had issues in the past with anemia/bleeding, Brilinta was changed to Plavix which helped.  Remains on Plavix.  Reports NSAID use.  Had EGD and colonoscopy 2 months ago.  EGD with chronic inactive gastritis and colonoscopy unremarkable    Interval Followup:   Blood pressure stable  Hemoglobin down to 7.7 this morning  Going for EGD    Objective   Objective     Vitals:   Temp:  [97.9 °F (36.6 °C)-98.3 °F (36.8 °C)] 98.1 °F (36.7 °C)  Heart Rate:  [] 75  Resp:  [16-18] 16  BP: ()/(54-79) 96/54  Physical Exam    Constitutional: conversant, NAD   Respiratory:  nonlabored respirations    Cardiovascular:  RRR, no edema   Gastrointestinal: soft, nondistended   Neurologic: Alert, speech clear   Skin: Extremities warm    Result Review    Result Review:  I have personally reviewed the following over the last 24 hours (07:00 to 07:00) and agree with the following findings  [x]  Laboratory  CBC          2024    08:02 2024    13:32 12/10/2024    04:08   CBC   WBC 6.88  14.97  9.42    RBC 4.13  3.03  2.54    Hemoglobin 13.0  9.1  7.7    Hematocrit 39.1  28.5  24.0    MCV 94.7  94.1  94.5    MCH 31.5  30.0  30.3    MCHC 33.2  31.9  32.1    RDW 11.5  12.4  12.5    Platelets 330  305  232      CMP          2024    08:02 2024    13:32 12/10/2024    04:08   CMP   Glucose 92  117  109    BUN 10  30  22    Creatinine 0.74  0.62  0.53    EGFR 91.6  100.8  104.7    Sodium 139  135  137    Potassium 4.5  4.5  3.9    Chloride 104  101  106    Calcium 8.9  8.4  7.9    Total Protein 6.2  6.0  5.1    Albumin 3.9  3.8   3.2    Globulin 2.3  2.2  1.9    Total Bilirubin 0.4  0.3  0.3    Alkaline Phosphatase 74  61  52    AST (SGOT) 22  19  17    ALT (SGPT) 20  23  17    Albumin/Globulin Ratio 1.7  1.7  1.7    BUN/Creatinine Ratio 13.5  48.4  41.5    Anion Gap 9.5  10.8  6.6      []  Microbiology  [x]  Radiology  [x]  EKG/Telemetry monitor personally reviewed and independently interpreted: NSR  []  Cardiology/Vascular   []  Pathology  []  Old records  [x]  Other:    Colonoscopy 10/02/2024  The perianal and digital rectal examinations were normal. Findings: - The colon (entire examined portion) appeared normal. Biopsies for histology were taken with a cold forceps from the ascending colon, transverse colon and descending colon for evaluation of microscopic colitis      Assessment & Plan   Assessment / Plan     Assessment/Plan:  Acute on chronic anemia  Rectal bleeding  History of gastric bypass  History of gastritis  Chronic NSAID use  CAD with prior stenting  Essential HTN  History of anxiety/depression       S/p EGD and colonoscopy 2 months ago  EGD 10/2/24: Normal esophagus, evidence of gastric bypass found, biopsy taken-mild chronic inactive gastritis otherwise negative, normal duodenum  Colonoscopy 10/2/24: Good prep, normal colonoscopy-random colon biopsies are negative    Hemoglobin 9.1 -> 7.7.  Platelets normal.  Ferritin 66, iron 129, TSAT 39%, transferrin and TIBC normal  Holding antiplatelet agents  Avoidance of NSAIDs  Continue home Protonix  GI on board.  Noted plans for EGD today  Repeat H&H at 1400, transfuse for hemoglobin less than 7  Blood pressure controlled.  Holding Coreg and Imdur  Restart home Wellbutrin  Advance diet postprocedure  CBC, BMP in a.m.    Discussed plan with RN.    VTE Prophylaxis:  Mechanical VTE prophylaxis orders are present.      CODE STATUS:   Level Of Support Discussed With: Patient  Code Status (Patient has no pulse and is not breathing): CPR (Attempt to Resuscitate)  Medical Interventions  (Patient has pulse or is breathing): Full Support    Electronically signed by Giovanni Day DO, 12/10/24, 1:17 PM EST.

## 2024-12-10 NOTE — H&P (VIEW-ONLY)
[facility]  Initial Inpatient Consult Note    Referring Provider: Hospitalist    Reason for Consultation: Melena, prior gastric bypass, NSAID use    Subjective     History of present illness:    64 y.o. female with a history of lap band and subsequent gastric bypass in 2019 admitted yesterday after having a few episodes of melena at home.  She had a recent upper respiratory infection and had been taking Advil Cold and Sinus multiple times per day over the past week.  She had a gastric bypass in 2019.  She has had troubles with recurrent chronic anemia a few years ago while taking Brilinta but that seemed to resolve after a change to Plavix.  She has a history of coronary artery disease with prior stents.  On arrival here her hemoglobin was 9.1 and decreased to 7.7 this morning.  She had a recent EGD and colonoscopy reviewed per Dr. Cabrera    Past Medical History:  Past Medical History:   Diagnosis Date    Allergic     Angina at rest     Anxiety     Bleeding ulcer     Bursitis of left shoulder 2018    CAD (coronary artery disease) 2015    Chronic allergic rhinitis     DDD (degenerative disc disease), lumbar 2016    GERD (gastroesophageal reflux disease) 2016    History of coronary artery stent placement     HLD (hyperlipidemia)     HTN (hypertension)     IBS (irritable bowel syndrome) 2016    MDD (major depressive disorder)     Nondisplaced fracture of proximal end of right fibula 2017    OA (osteoarthritis)     Obesity (BMI 30-39.9)     Seasonal allergies      Past Surgical History:  Past Surgical History:   Procedure Laterality Date    CARDIAC SURGERY       SECTION      COLONOSCOPY      COLONOSCOPY N/A 2022    Procedure: COLONOSCOPY;  Surgeon: Santana Cabrera MD;  Location: [facility] ENDOSCOPY;  Service: Gastroenterology;  Laterality: N/A;  diverticulosis    COLONOSCOPY N/A 10/2/2024    Procedure: COLONOSCOPY WITH BIOPSIES;  Surgeon:  Santana Cabrera MD;  Location: [facility] ENDOSCOPY;  Service: Gastroenterology;  Laterality: N/A;  DIVERTICULOSIS    CORONARY ANGIOPLASTY WITH STENT PLACEMENT      ENDOSCOPY N/A 2022    Procedure: ESOPHAGOGASTRODUODENOSCOPY WITH BIOPSY;  Surgeon: Santana Cabrera MD;  Location: [facility] ENDOSCOPY;  Service: Gastroenterology;  Laterality: N/A;  PREVIOUS SURGERY/GASTRIC EROSIONS    ENDOSCOPY N/A 2022    Procedure: ESOPHAGOGASTRODUODENOSCOPY;  Surgeon: Santana Cabrera MD;  Location: [facility] ENDOSCOPY;  Service: Gastroenterology;  Laterality: N/A;  PREVIOUS SURGERY    ENDOSCOPY N/A 10/2/2024    Procedure: ESOPHAGOGASTRODUODENOSCOPY WITH BIOPSIES;  Surgeon: Sanatna Cabrera MD;  Location: [facility] ENDOSCOPY;  Service: Gastroenterology;  Laterality: N/A;  PREVIOUS SURGERY    EYE SURGERY      Implant     GASTRIC BYPASS      HYSTERECTOMY  2009    LAPAROSCOPIC GASTRIC BANDING  2008    SQUAMOUS CELL CARCINOMA EXCISION Right 2023    Right thumb    TONSILLECTOMY        Social History:   Social History     Tobacco Use    Smoking status: Former     Current packs/day: 0.00     Average packs/day: 0.5 packs/day for 25.0 years (12.5 ttl pk-yrs)     Types: Cigarettes     Start date:      Quit date:      Years since quittin.9    Smokeless tobacco: Never    Tobacco comments:     QUIT 15 YEARS AGO   Substance Use Topics    Alcohol use: Yes     Alcohol/week: 2.0 standard drinks of alcohol     Types: 2 Glasses of wine per week     Comment: Occ; has been drinking for 31 or more years      Family History:  Family History   Problem Relation Age of Onset    Heart disease Mother     Diabetes Mother     Arthritis Mother     Osteoporosis Mother     Heart disease Father     Diabetes Father     Arthritis Father     Stroke Sister     Heart disease Other     Diabetes Other     Colon cancer Neg Hx        Home Meds:  Medications Prior to Admission   Medication Sig Dispense Refill Last Dose/Taking    aspirin 81  MG chewable tablet Chew 1 tablet Daily.   12/9/2024    buPROPion XL (WELLBUTRIN XL) 300 MG 24 hr tablet TAKE 1 TABLET BY MOUTH DAILY 90 tablet 3 12/9/2024    carvedilol (COREG) 6.25 MG tablet Take 1 tablet by mouth 2 (Two) Times a Day With Meals. 180 tablet 3 12/8/2024    cetirizine (zyrTEC) 10 MG tablet TAKE 1 TABLET DAILY 90 tablet 3 12/9/2024    clopidogrel (PLAVIX) 75 MG tablet Take 1 tablet by mouth Daily.   12/9/2024    cyclobenzaprine (FLEXERIL) 10 MG tablet TAKE 1 TABLET BY MOUTH 3 TIMES  DAILY FOR BACK PAIN (Patient taking differently: Take 1 tablet by mouth 3 (Three) Times a Day As Needed.) 270 tablet 3 Taking Differently    estradiol (VIVELLE-DOT) 0.1 MG/24HR patch Place 1 patch on the skin as directed by provider 2 (Two) Times a Week. 8 each 11 Taking    ezetimibe-simvastatin (VYTORIN) 10-40 MG per tablet Take 1 tablet by mouth Every Night.   12/8/2024    isosorbide mononitrate (IMDUR) 30 MG 24 hr tablet Take 1 tablet by mouth Daily.   12/9/2024    nitroglycerin (NITROSTAT) 0.4 MG SL tablet Place 1 tablet under the tongue Every 5 (Five) Minutes As Needed for Chest Pain. Take no more than 3 doses in 15 minutes.   Taking As Needed    ondansetron ODT (ZOFRAN-ODT) 8 MG disintegrating tablet Place 1 tablet on the tongue Every 8 (Eight) Hours As Needed for Nausea or Vomiting. 30 tablet 2 Taking As Needed    pantoprazole (PROTONIX) 40 MG EC tablet TAKE 1 TABLET BY MOUTH TWICE  DAILY FOR ESOPHAGUS INFLAMMATION WITH EROSION, STOMACH ULCER (Patient taking differently: Take 1 tablet by mouth 2 (Two) Times a Day.) 180 tablet 3 12/9/2024    fluticasone (FLONASE) 50 MCG/ACT nasal spray Administer 2 sprays into the nostril(s) as directed by provider Daily. 11.1 mL 5     Semaglutide,0.25 or 0.5MG/DOS, (OZEMPIC) 2 MG/1.5ML solution pen-injector Inject 0.25 mg under the skin into the appropriate area as directed 1 (One) Time Per Week. SUNDAYS   12/1/2024     Current Meds:   buPROPion XL, 300 mg, Oral, Daily  pantoprazole,  "40 mg, Oral, Q AM      Allergies:  Allergies   Allergen Reactions    Shellfish Allergy Anaphylaxis    Shellfish-Derived Products Anaphylaxis     Throat swelling    Penicillins Unknown - High Severity     As baby        Review of Systems  Pertinent items are noted in HPI, all other systems reviewed and negative         Vital Signs  Temp:  [97.9 °F (36.6 °C)-98.3 °F (36.8 °C)] 98.3 °F (36.8 °C)  Heart Rate:  [] 73  Resp:  [13-18] 13  BP: ()/(54-79) 123/72  Physical Exam:  General Appearance:    Alert, cooperative, in no acute distress   Head:    Normocephalic, without obvious abnormality, atraumatic   Eyes:          conjunctivae and sclerae normal, no   icterus   Throat:   no thrush, oral mucosa moist   Neck:   Supple, no adenopathy   Lungs:     Clear to auscultation bilaterally    Heart:    Regular rhythm and normal rate    Chest Wall:    No abnormalities observed   Abdomen:     Soft, nondistended, nontender; normal bowel sounds   Extremities:   no edema, no redness   Skin:   No bruising or rash   Psychiatric:  normal mood and insight     Results Review:  [x]  Laboratory   [x]  Radiology  []  Pathology      I reviewed the patient's new clinical results.    Results from last 7 days   Lab Units 12/10/24  0408 12/09/24  1332   WBC 10*3/mm3 9.42 14.97*   HEMOGLOBIN g/dL 7.7* 9.1*   HEMATOCRIT % 24.0* 28.5*   PLATELETS 10*3/mm3 232 305     Results from last 7 days   Lab Units 12/10/24  0408 12/09/24  1332   SODIUM mmol/L 137 135*   POTASSIUM mmol/L 3.9 4.5   CHLORIDE mmol/L 106 101   CO2 mmol/L 24.4 23.2   BUN mg/dL 22 30*   CREATININE mg/dL 0.53* 0.62   CALCIUM mg/dL 7.9* 8.4*   BILIRUBIN mg/dL 0.3 0.3   ALK PHOS U/L 52 61   ALT (SGPT) U/L 17 23   AST (SGOT) U/L 17 19   GLUCOSE mg/dL 109* 117*         No results found for: \"LIPASE\"    Radiology:  CT Abdomen Pelvis With Contrast    Result Date: 12/9/2024  Impression: 1.No acute abnormality identified within the abdomen or pelvis. 2.Colonic diverticulosis. " 3.Additional findings as detailed above. Electronically Signed: Josh Rodriguez MD  12/9/2024 5:22 PM EST  Workstation ID: [id number]     Assessment & Plan       Rectal bleeding    GI bleed  NSAID use  Prior gastric bypass  Acute blood loss anemia    Plan:  I will schedule patient for an upper endoscopy later today.  Discussed risk and benefits of EGD and she is willing to proceed.  I suspect in the setting of Plavix the recent NSAID use has caused an ulcer.  Her Plavix has been on hold and she is on a PPI overnight      I discussed the patients findings and my recommendations with patient and nursing staff.    Fady Schneider MD

## 2024-12-11 ENCOUNTER — READMISSION MANAGEMENT (OUTPATIENT)
Dept: CALL CENTER | Facility: HOSPITAL | Age: 64
End: 2024-12-11
Payer: COMMERCIAL

## 2024-12-11 VITALS
WEIGHT: 173.06 LBS | SYSTOLIC BLOOD PRESSURE: 123 MMHG | HEIGHT: 68 IN | TEMPERATURE: 97.2 F | BODY MASS INDEX: 26.23 KG/M2 | HEART RATE: 90 BPM | RESPIRATION RATE: 18 BRPM | DIASTOLIC BLOOD PRESSURE: 65 MMHG | OXYGEN SATURATION: 100 %

## 2024-12-11 PROBLEM — D62 ACUTE BLOOD LOSS ANEMIA: Status: ACTIVE | Noted: 2024-12-11

## 2024-12-11 LAB
ANION GAP SERPL CALCULATED.3IONS-SCNC: 6.5 MMOL/L (ref 5–15)
BUN SERPL-MCNC: 16 MG/DL (ref 8–23)
BUN/CREAT SERPL: 33.3 (ref 7–25)
CALCIUM SPEC-SCNC: 7.7 MG/DL (ref 8.6–10.5)
CHLORIDE SERPL-SCNC: 109 MMOL/L (ref 98–107)
CO2 SERPL-SCNC: 23.5 MMOL/L (ref 22–29)
CREAT SERPL-MCNC: 0.48 MG/DL (ref 0.57–1)
DEPRECATED RDW RBC AUTO: 42.4 FL (ref 37–54)
EGFRCR SERPLBLD CKD-EPI 2021: 107.2 ML/MIN/1.73
ERYTHROCYTE [DISTWIDTH] IN BLOOD BY AUTOMATED COUNT: 12.3 % (ref 12.3–15.4)
GLUCOSE SERPL-MCNC: 85 MG/DL (ref 65–99)
HCT VFR BLD AUTO: 21.5 % (ref 34–46.6)
HCT VFR BLD AUTO: 26.7 % (ref 34–46.6)
HGB BLD-MCNC: 6.8 G/DL (ref 12–15.9)
HGB BLD-MCNC: 8.6 G/DL (ref 12–15.9)
MCH RBC QN AUTO: 30.1 PG (ref 26.6–33)
MCHC RBC AUTO-ENTMCNC: 31.6 G/DL (ref 31.5–35.7)
MCV RBC AUTO: 95.1 FL (ref 79–97)
PLATELET # BLD AUTO: 205 10*3/MM3 (ref 140–450)
PMV BLD AUTO: 10.2 FL (ref 6–12)
POTASSIUM SERPL-SCNC: 4.2 MMOL/L (ref 3.5–5.2)
RBC # BLD AUTO: 2.26 10*6/MM3 (ref 3.77–5.28)
RETICS # AUTO: 0.07 10*6/MM3 (ref 0.02–0.13)
RETICS/RBC NFR AUTO: 2.98 % (ref 0.7–1.9)
SODIUM SERPL-SCNC: 139 MMOL/L (ref 136–145)
WBC NRBC COR # BLD AUTO: 7.39 10*3/MM3 (ref 3.4–10.8)

## 2024-12-11 PROCEDURE — 99239 HOSP IP/OBS DSCHRG MGMT >30: CPT | Performed by: INTERNAL MEDICINE

## 2024-12-11 PROCEDURE — P9016 RBC LEUKOCYTES REDUCED: HCPCS

## 2024-12-11 PROCEDURE — 85018 HEMOGLOBIN: CPT | Performed by: INTERNAL MEDICINE

## 2024-12-11 PROCEDURE — 85014 HEMATOCRIT: CPT | Performed by: INTERNAL MEDICINE

## 2024-12-11 PROCEDURE — 86900 BLOOD TYPING SEROLOGIC ABO: CPT

## 2024-12-11 PROCEDURE — 85027 COMPLETE CBC AUTOMATED: CPT | Performed by: INTERNAL MEDICINE

## 2024-12-11 PROCEDURE — G0378 HOSPITAL OBSERVATION PER HR: HCPCS

## 2024-12-11 PROCEDURE — 85045 AUTOMATED RETICULOCYTE COUNT: CPT | Performed by: FAMILY MEDICINE

## 2024-12-11 PROCEDURE — 36430 TRANSFUSION BLD/BLD COMPNT: CPT

## 2024-12-11 PROCEDURE — 80048 BASIC METABOLIC PNL TOTAL CA: CPT | Performed by: INTERNAL MEDICINE

## 2024-12-11 RX ADMIN — BUPROPION HYDROCHLORIDE 300 MG: 150 TABLET, EXTENDED RELEASE ORAL at 09:29

## 2024-12-11 RX ADMIN — PANTOPRAZOLE SODIUM 40 MG: 40 TABLET, DELAYED RELEASE ORAL at 07:47

## 2024-12-11 NOTE — PLAN OF CARE
Goal Outcome Evaluation:  Plan of Care Reviewed With: patient        Progress: no change  Outcome Evaluation: Patient remains able to move ad jeet. No c/o pain or nausea through the night. Patient has been tolerating regular diet. Hgb 6.8 with AM labs, MD aware, RBCs ordered. Blood not started at this time due to waiting for blood to be ready from blood bank.

## 2024-12-11 NOTE — DISCHARGE SUMMARY
[facility]         HOSPITALIST  DISCHARGE SUMMARY    Patient Name: Viviana Hodges  : 1960  MRN: [id number]    Date of Admission: 2024  Date of Discharge:  24  Primary Care Physician: Delfin Bhandari DO    Consults       Date and Time Order Name Status Description    12/10/2024  5:56 AM Inpatient Gastroenterology Consult Completed     2024  3:27 PM Inpatient Hospitalist Consult      2024  3:27 PM Gastroenterology (on-call MD unless specified)              Active and Resolved Hospital Problems:  Acute on chronic anemia  Rectal bleeding  History of gastric bypass  History of gastritis  Chronic NSAID use  CAD with prior stenting  Essential HTN  History of anxiety/depression    Hospital Course     Hospital Course:  Viviana Hodges is a 64 y.o. female with CAD with prior stenting , HTN, HLD, IBS, gastric bypass in , diverticulosis, chronic anemia who presented with rectal bleeding.  Had issues in the past with anemia/bleeding, Brilinta was changed to Plavix which helped.  Remained on aspirin and Plavix.  Could report recent NSAID use.  On presentation hemoglobin 9.1 but dropped to 7.7.  Platelets normal. Ferritin 66, iron 129, TSAT 39%, transferrin and TIBC normal.  Continued on PPI.  Of note, EGD 10/2/24: Normal esophagus, evidence of gastric bypass found, biopsy taken-mild chronic inactive gastritis otherwise negative, normal duodenum  Colonoscopy 10/2/24: Good prep, normal colonoscopy-random colon biopsies are negative.  EGD performed, reported normal esophagus, gastric bypass with normal-sized pouch, normal jejunum.  Hemoglobin did drop to 6.8 requiring 1 unit PRBC with improvement to 8.6.  Had no further rectal bleeding, melena or other blood loss.  Remained hemodynamically stable and orthostatics were negative.  Discussed holding both aspirin and Plavix until repeat blood work in a couple of days.  Likely does not need both antiplatelet agents as  last stent was over 2 years ago however patient preferred to discuss this with her cardiologist.  Discharged home in stable condition with PCP follow-up scheduled 12/19.    DISCHARGE Follow Up Recommendations for labs and diagnostics:   - H/H in 48 hours    Day of Discharge     Vital Signs:  Temp:  [96.9 °F (36.1 °C)-98.6 °F (37 °C)] 97.2 °F (36.2 °C)  Heart Rate:  [71-98] 90  Resp:  [16-18] 18  BP: ()/(51-80) 123/65  Physical Exam:   GENERAL: conversant and nontoxic.  HEART: No edema  LUNGS: nonlabored  ABDOMEN: Soft, nondistended  NEUROLOGIC: Alert, CN intact    Discharge Details        Discharge Medications        Changes to Medications        Instructions Start Date   cyclobenzaprine 10 MG tablet  Commonly known as: FLEXERIL  What changed: See the new instructions.   TAKE 1 TABLET BY MOUTH 3 TIMES  DAILY FOR BACK PAIN      pantoprazole 40 MG EC tablet  Commonly known as: PROTONIX  What changed: See the new instructions.   TAKE 1 TABLET BY MOUTH TWICE  DAILY FOR ESOPHAGUS INFLAMMATION WITH EROSION, STOMACH ULCER             Continue These Medications        Instructions Start Date   buPROPion  MG 24 hr tablet  Commonly known as: WELLBUTRIN XL   300 mg, Oral, Daily      carvedilol 6.25 MG tablet  Commonly known as: COREG   6.25 mg, Oral, 2 Times Daily With Meals      cetirizine 10 MG tablet  Commonly known as: zyrTEC   TAKE 1 TABLET DAILY      estradiol 0.1 MG/24HR patch  Commonly known as: VIVELLE-DOT   1 patch, Transdermal, 2 Times Weekly      ezetimibe-simvastatin 10-40 MG per tablet  Commonly known as: VYTORIN   1 tablet, Nightly      fluticasone 50 MCG/ACT nasal spray  Commonly known as: FLONASE   2 sprays, Nasal, Daily      isosorbide mononitrate 30 MG 24 hr tablet  Commonly known as: IMDUR   30 mg, Daily      nitroglycerin 0.4 MG SL tablet  Commonly known as: NITROSTAT   0.4 mg, Every 5 Minutes PRN      ondansetron ODT 8 MG disintegrating tablet  Commonly known as: ZOFRAN-ODT   8 mg,  Translingual, Every 8 Hours PRN      Semaglutide(0.25 or 0.5MG/DOS) 2 MG/1.5ML solution pen-injector  Commonly known as: OZEMPIC   0.25 mg, Subcutaneous, Weekly, SUNDAYS             Stop These Medications      aspirin 81 MG chewable tablet     clopidogrel 75 MG tablet  Commonly known as: PLAVIX              Allergies   Allergen Reactions    Shellfish Allergy Anaphylaxis    Shellfish-Derived Products Anaphylaxis     Throat swelling    Penicillins Unknown - High Severity     As baby          Discharge Disposition:  Home or Self Care    Diet:  Hospital:  Diet Order   Procedures    Diet: Regular/House; Fluid Consistency: Thin (IDDSI 0)       Discharge Activity:   Activity Instructions       Activity as Tolerated              CODE STATUS:  Code Status and Medical Interventions: CPR (Attempt to Resuscitate); Full Support   Ordered at: 12/09/24 1541     Level Of Support Discussed With:    Patient     Code Status (Patient has no pulse and is not breathing):    CPR (Attempt to Resuscitate)     Medical Interventions (Patient has pulse or is breathing):    Full Support         No future appointments.    Additional Instructions for the Follow-ups that You Need to Schedule       Discharge Follow-up with PCP   As directed       Currently Documented PCP:    Delfin Bhandari DO    PCP Phone Number:    [phone]     Follow Up Details: within 1 week        Hemoglobin & Hematocrit, Blood    Dec 13, 2024 (Approximate)      Release to patient: Routine Release                Pertinent  and/or Most Recent Results     PROCEDURES:   EGD  - Normal esophagus. - Gastric bypass with a normal-sized pouch. - Normal examined jejunum. - No specimens collected    LAB RESULTS:      Lab 12/11/24  1316 12/11/24  0525 12/10/24  1415 12/10/24  0408 12/09/24  1332   WBC  --  7.39  --  9.42 14.97*   HEMOGLOBIN 8.6* 6.8* 8.1* 7.7* 9.1*   HEMATOCRIT 26.7* 21.5* 25.4* 24.0* 28.5*   PLATELETS  --  205  --  232 305   NEUTROS ABS  --   --   --  5.19 10.29*    IMMATURE GRANS (ABS)  --   --   --  0.05 0.07*   LYMPHS ABS  --   --   --  3.29* 3.55*   MONOS ABS  --   --   --  0.72 0.95*   EOS ABS  --   --   --  0.12 0.06   MCV  --  95.1  --  94.5 94.1   LACTATE  --   --   --   --  1.3         Lab 12/11/24  0525 12/10/24  0408 12/09/24  1332   SODIUM 139 137 135*   POTASSIUM 4.2 3.9 4.5   CHLORIDE 109* 106 101   CO2 23.5 24.4 23.2   ANION GAP 6.5 6.6 10.8   BUN 16 22 30*   CREATININE 0.48* 0.53* 0.62   EGFR 107.2 104.7 100.8   GLUCOSE 85 109* 117*   CALCIUM 7.7* 7.9* 8.4*         Lab 12/10/24  0408 12/09/24  1332   TOTAL PROTEIN 5.1* 6.0   ALBUMIN 3.2* 3.8   GLOBULIN 1.9 2.2   ALT (SGPT) 17 23   AST (SGOT) 17 19   BILIRUBIN 0.3 0.3   ALK PHOS 52 61                 Lab 12/10/24  0408 12/09/24  1332   IRON 129  --    IRON SATURATION (TSAT) 39  --    TIBC 332  --    TRANSFERRIN 223  --    FERRITIN 66.19  --    ABO TYPING  --  O   RH TYPING  --  Positive   ANTIBODY SCREEN  --  Negative         Brief Urine Lab Results       None          Microbiology Results (last 10 days)       ** No results found for the last 240 hours. **            CT Abdomen Pelvis With Contrast    Result Date: 12/9/2024  Impression: 1.No acute abnormality identified within the abdomen or pelvis. 2.Colonic diverticulosis. 3.Additional findings as detailed above. Electronically Signed: Josh Rodriguez MD  12/9/2024 5:22 PM EST  Workstation ID: [id number]                  Labs Pending at Discharge:        Time spent on Discharge including face to face service: >30 minutes    Electronically signed by Giovanni Day DO, 12/11/24, 3:32 PM EST.

## 2024-12-11 NOTE — OUTREACH NOTE
Prep Survey      Flowsheet Row Responses   [employer] facility patient discharged from? [facility]   Is LACE score < 7 ? Yes   Eligibility [facility]   Date of Admission 12/09/24   Date of Discharge 12/11/24   Discharge Disposition Home or Self Care   Discharge diagnosis Rectal bleeding -EGD this visit   Does the patient have one of the following disease processes/diagnoses(primary or secondary)? Other   Does the patient have Home health ordered? No   Is there a DME ordered? No   Prep survey completed? Yes            GOMEZ ALEMAN - Registered Nurse

## 2024-12-11 NOTE — PLAN OF CARE
Goal Outcome Evaluation:         Education complete. Patient is discharging.

## 2024-12-12 ENCOUNTER — TRANSITIONAL CARE MANAGEMENT TELEPHONE ENCOUNTER (OUTPATIENT)
Dept: CALL CENTER | Facility: HOSPITAL | Age: 64
End: 2024-12-12
Payer: COMMERCIAL

## 2024-12-12 LAB
BH BB BLOOD EXPIRATION DATE: NORMAL
BH BB BLOOD TYPE BARCODE: 5100
BH BB DISPENSE STATUS: NORMAL
BH BB PRODUCT CODE: NORMAL
BH BB UNIT NUMBER: NORMAL
CROSSMATCH INTERPRETATION: NORMAL
UNIT  ABO: NORMAL
UNIT  RH: NORMAL

## 2024-12-12 NOTE — OUTREACH NOTE
Call Center TCM Note      Flowsheet Row Responses   [facility] [place] facility patient discharged from? [facility]   Does the patient have one of the following disease processes/diagnoses(primary or secondary)? Other   TCM attempt successful? No   Unsuccessful attempts Attempt 1            Rosaline Clay RN    12/12/2024, 13:15 EST

## 2024-12-12 NOTE — OUTREACH NOTE
Call Center TCM Note      Flowsheet Row Responses   [facility] facility patient discharged from? [facility]   Does the patient have one of the following disease processes/diagnoses(primary or secondary)? Other   TCM attempt successful? No   Unsuccessful attempts Attempt 2            Rosaline Clay RN    12/12/2024, 15:28 EST

## 2024-12-13 ENCOUNTER — LAB (OUTPATIENT)
Dept: LAB | Facility: HOSPITAL | Age: 64
End: 2024-12-13
Payer: COMMERCIAL

## 2024-12-13 ENCOUNTER — TRANSITIONAL CARE MANAGEMENT TELEPHONE ENCOUNTER (OUTPATIENT)
Dept: CALL CENTER | Facility: HOSPITAL | Age: 64
End: 2024-12-13
Payer: COMMERCIAL

## 2024-12-13 DIAGNOSIS — D62 ACUTE BLOOD LOSS ANEMIA: ICD-10-CM

## 2024-12-13 DIAGNOSIS — D50.0 IRON DEFICIENCY ANEMIA DUE TO CHRONIC BLOOD LOSS: Chronic | ICD-10-CM

## 2024-12-13 DIAGNOSIS — J30.9 CHRONIC ALLERGIC RHINITIS: Chronic | ICD-10-CM

## 2024-12-13 DIAGNOSIS — K21.9 CHRONIC GERD: Chronic | ICD-10-CM

## 2024-12-13 DIAGNOSIS — I10 PRIMARY HYPERTENSION: Chronic | ICD-10-CM

## 2024-12-13 DIAGNOSIS — Z98.61 CORONARY ANGIOPLASTY STATUS: Chronic | ICD-10-CM

## 2024-12-13 DIAGNOSIS — I25.10 ATHEROSCLEROSIS OF NATIVE CORONARY ARTERY OF NATIVE HEART WITHOUT ANGINA PECTORIS: Chronic | ICD-10-CM

## 2024-12-13 DIAGNOSIS — N95.9 POSTMENOPAUSAL SYMPTOMS: Chronic | ICD-10-CM

## 2024-12-13 DIAGNOSIS — E78.2 MIXED HYPERLIPIDEMIA: Chronic | ICD-10-CM

## 2024-12-13 DIAGNOSIS — E66.3 OVERWEIGHT (BMI 25.0-29.9): Chronic | ICD-10-CM

## 2024-12-13 LAB
ALBUMIN SERPL-MCNC: 3.6 G/DL (ref 3.5–5.2)
ALBUMIN/GLOB SERPL: 1.6 G/DL
ALP SERPL-CCNC: 62 U/L (ref 39–117)
ALT SERPL W P-5'-P-CCNC: 18 U/L (ref 1–33)
ANION GAP SERPL CALCULATED.3IONS-SCNC: 7.7 MMOL/L (ref 5–15)
AST SERPL-CCNC: 19 U/L (ref 1–32)
BILIRUB SERPL-MCNC: 0.2 MG/DL (ref 0–1.2)
BUN SERPL-MCNC: 13 MG/DL (ref 8–23)
BUN/CREAT SERPL: 21.3 (ref 7–25)
CALCIUM SPEC-SCNC: 8.5 MG/DL (ref 8.6–10.5)
CHLORIDE SERPL-SCNC: 106 MMOL/L (ref 98–107)
CHOLEST SERPL-MCNC: 120 MG/DL (ref 0–200)
CO2 SERPL-SCNC: 24.3 MMOL/L (ref 22–29)
CREAT SERPL-MCNC: 0.61 MG/DL (ref 0.57–1)
DEPRECATED RDW RBC AUTO: 43.9 FL (ref 37–54)
EGFRCR SERPLBLD CKD-EPI 2021: 101.2 ML/MIN/1.73
ERYTHROCYTE [DISTWIDTH] IN BLOOD BY AUTOMATED COUNT: 13 % (ref 12.3–15.4)
FERRITIN SERPL-MCNC: 54.5 NG/ML (ref 13–150)
GLOBULIN UR ELPH-MCNC: 2.2 GM/DL
GLUCOSE SERPL-MCNC: 98 MG/DL (ref 65–99)
HCT VFR BLD AUTO: 26.6 % (ref 34–46.6)
HDLC SERPL-MCNC: 57 MG/DL (ref 40–60)
HGB BLD-MCNC: 8.8 G/DL (ref 12–15.9)
IRON 24H UR-MRATE: 53 MCG/DL (ref 37–145)
IRON SATN MFR SERPL: 14 % (ref 20–50)
LDLC SERPL CALC-MCNC: 50 MG/DL (ref 0–100)
LDLC/HDLC SERPL: 0.89 {RATIO}
MCH RBC QN AUTO: 31.2 PG (ref 26.6–33)
MCHC RBC AUTO-ENTMCNC: 33.1 G/DL (ref 31.5–35.7)
MCV RBC AUTO: 94.3 FL (ref 79–97)
PLATELET # BLD AUTO: 307 10*3/MM3 (ref 140–450)
PMV BLD AUTO: 10.4 FL (ref 6–12)
POTASSIUM SERPL-SCNC: 4.2 MMOL/L (ref 3.5–5.2)
PROT SERPL-MCNC: 5.8 G/DL (ref 6–8.5)
RBC # BLD AUTO: 2.82 10*6/MM3 (ref 3.77–5.28)
SODIUM SERPL-SCNC: 138 MMOL/L (ref 136–145)
T4 FREE SERPL-MCNC: 1.27 NG/DL (ref 0.92–1.68)
TIBC SERPL-MCNC: 392 MCG/DL (ref 298–536)
TRANSFERRIN SERPL-MCNC: 263 MG/DL (ref 200–360)
TRIGL SERPL-MCNC: 60 MG/DL (ref 0–150)
TSH SERPL DL<=0.05 MIU/L-ACNC: 1.99 UIU/ML (ref 0.27–4.2)
VLDLC SERPL-MCNC: 13 MG/DL (ref 5–40)
WBC NRBC COR # BLD AUTO: 8.12 10*3/MM3 (ref 3.4–10.8)

## 2024-12-13 PROCEDURE — 36415 COLL VENOUS BLD VENIPUNCTURE: CPT

## 2024-12-13 PROCEDURE — 80061 LIPID PANEL: CPT

## 2024-12-13 PROCEDURE — 84439 ASSAY OF FREE THYROXINE: CPT

## 2024-12-13 PROCEDURE — 82728 ASSAY OF FERRITIN: CPT

## 2024-12-13 PROCEDURE — 83540 ASSAY OF IRON: CPT

## 2024-12-13 PROCEDURE — 80050 GENERAL HEALTH PANEL: CPT

## 2024-12-13 PROCEDURE — 84466 ASSAY OF TRANSFERRIN: CPT

## 2024-12-13 NOTE — OUTREACH NOTE
Call Center TCM Note      Flowsheet Row Responses   [facility] facility patient discharged from? [facility]   Does the patient have one of the following disease processes/diagnoses(primary or secondary)? Other   TCM attempt successful? No   Unsuccessful attempts Attempt 3  [attempted patient and spouse Jason listed on PCP verbal release]            Maria Guadalupe Lau RN    12/13/2024, 12:00 EST

## 2024-12-15 PROBLEM — K95.89: Status: ACTIVE | Noted: 2024-12-15

## 2024-12-15 PROBLEM — K22.4: Status: ACTIVE | Noted: 2024-12-15

## 2024-12-15 NOTE — PROGRESS NOTES
"Chief Complaint  Weight Loss (Discuss Zepbound and pharmacy issues. )    Subjective          Vviiana Hodges presents to [facility]  Weight Loss    Patient presents for follow up on continuing medicine to help with long term management of weight and control to current BMI range 25-27, has had gastric bypass in past 5 years or so and lap-band previously so maintaining her weight at reasonable low risk range is very important to her bc she knows how excess weight makes her feel as well as complicates her already known high risk for heart attack bc she has already had to have multiple stents and procedure for her CAD and prescribed high dose chronic medicines to control cholesterol, manage blood pressure at goal <140/90    She follows with cardiology regularly with ECHOs and stress testing making sure everything is patent. She also has chronic blood loss and anemia from past surgeries and chronic blood thinners can easily become severely anemic and high risk for GI bleed, we check regularly as well as iron levels if able to keep iron stores helps with reducing and managing.     Refilling and titrating zepbound for patient to use weekly at doses preferably aimed for weight maintenance after she is able to reduce weight back to her goal BMI <25 or so        Objective   Vital Signs:   /80 (BP Location: Left arm, Patient Position: Sitting, Cuff Size: Adult)   Pulse 73   Temp 97.2 °F (36.2 °C)   Resp 16   Ht 172.7 cm (68\")   Wt 79.8 kg (176 lb)   SpO2 97%   BMI 26.76 kg/m²            Physical Exam  Vitals reviewed.   Constitutional:       Appearance: Normal appearance. She is well-developed.   HENT:      Head: Normocephalic and atraumatic.      Right Ear: External ear normal.      Left Ear: External ear normal.      Nose: Nose normal.   Eyes:      Conjunctiva/sclera: Conjunctivae normal.      Pupils: Pupils are equal, round, and reactive to light.   Cardiovascular:      " Rate and Rhythm: Normal rate.   Pulmonary:      Effort: Pulmonary effort is normal.      Breath sounds: Normal breath sounds.   Abdominal:      General: There is no distension.   Skin:     General: Skin is warm and dry.   Neurological:      Mental Status: She is alert and oriented to person, place, and time. Mental status is at baseline.   Psychiatric:         Mood and Affect: Mood and affect normal.         Behavior: Behavior normal.         Thought Content: Thought content normal.         Judgment: Judgment normal.          Result Review :   The following data was reviewed by: Delfin Bhandari DO on 12/04/2024:  Common labs          12/10/2024    04:08 12/10/2024    14:15 12/11/2024    05:25 12/11/2024    13:16 12/13/2024    10:21   Common Labs   Glucose 109   85   98    BUN 22   16   13    Creatinine 0.53   0.48   0.61    Sodium 137   139   138    Potassium 3.9   4.2   4.2    Chloride 106   109   106    Calcium 7.9   7.7   8.5    Albumin 3.2     3.6    Total Bilirubin 0.3     0.2    Alkaline Phosphatase 52     62    AST (SGOT) 17     19    ALT (SGPT) 17     18    WBC 9.42   7.39   8.12    Hemoglobin 7.7  8.1  6.8  8.6  8.8    Hematocrit 24.0  25.4  21.5  26.7  26.6    Platelets 232   205   307    Total Cholesterol     120    Triglycerides     60    HDL Cholesterol     57    LDL Cholesterol      50      Data reviewed : Radiologic studies last mammogram 10/2024 birads 1 can repeat every 1-2 years as appropriate for screening      CT abd pelvis 8/2024 ordered for chronic RUQ pain patient continues to experience and cause pain symptoms that radiate across abdomen,    Overall normal findings, diverticulosis seen, lower pole left kidney 1.9cm cyst, 8mm left adrenal gland nodule, post surgical findings changes from gastric bypass     CT Abdomen Pelvis Without Contrast (08/07/2024 15:10)     US gallbladder 9/11/2024 showed no evidence of stones or any other findings to correlate to her symptoms and radiating RUQ pain, did  note right kidney mildly small in size  *discussed more workup, HIDA scan for gallbladder if indicated or gastric emptying study    US Gallbladder (09/11/2024 08:18)            Assessment and Plan    Diagnoses and all orders for this visit:    1. Overweight (BMI 25.0-29.9) (Primary)    2. Coronary angioplasty status    3. Atherosclerosis of native coronary artery of native heart without angina pectoris    4. Chronic allergic rhinitis    5. Postmenopausal symptoms    6. Mixed hyperlipidemia    7. Chronic GERD    8. H/O gastric bypass    9. Chronic coronary microvascular dysfunction    10. Right upper quadrant abdominal pain    11. Acute blood loss anemia    12. Esophageal dysmotility after bariatric surgery      Zepbound rx to go along with form signed for patient to receive prescription assistance or alternative similar program with medicine that is more affordable  Keep weekly dose at maintenance dose regimen to keep BMI around 25, still establishing what that dose may be    Continue medicines as prescribed for blood pressure, at goal <140/90    Continue medicine for cholesterol, LDL goal less than 70 is taking Vytorin    Continue Protonix twice daily for chronic GI complaints, follows with GI has had EGDs and colonoscopies, did note esophageal dysmotility from previous gastric surgeries unsure if related to patient's ongoing chronic complaint of radiating abd pain more in upper portion of abd what radiates across to her right side    Continue monitoring blood counts and lab overall to avoid exacerbation of anemia due to acute blood loss, recheck CBC with iron ferritin and other labs in the next month or so or as scheduled    Testing to diagnosis associated underlying condition has resulted so far in only fecal lactoferrin elevation which is associated with inflammation      Follow Up   Return in about 2 months (around 2/4/2025), or if symptoms worsen or fail to improve, for Next scheduled follow up, Labs before,  Recheck.  Patient was given instructions and counseling regarding her condition or for health maintenance advice. Please see specific information pulled into the AVS if appropriate.     Transcribed from ambient dictation for Delfin Bhandari DO by Delfin Bhandari DO.  12/15/24   02:20 EST

## 2024-12-19 ENCOUNTER — OFFICE VISIT (OUTPATIENT)
Dept: FAMILY MEDICINE CLINIC | Facility: CLINIC | Age: 64
End: 2024-12-19
Payer: COMMERCIAL

## 2024-12-19 ENCOUNTER — LAB (OUTPATIENT)
Dept: LAB | Facility: HOSPITAL | Age: 64
End: 2024-12-19
Payer: COMMERCIAL

## 2024-12-19 VITALS
WEIGHT: 180.6 LBS | RESPIRATION RATE: 16 BRPM | HEART RATE: 70 BPM | BODY MASS INDEX: 27.37 KG/M2 | TEMPERATURE: 97.2 F | HEIGHT: 68 IN | DIASTOLIC BLOOD PRESSURE: 67 MMHG | SYSTOLIC BLOOD PRESSURE: 130 MMHG | OXYGEN SATURATION: 97 %

## 2024-12-19 DIAGNOSIS — K62.5 RECTAL BLEEDING: Chronic | ICD-10-CM

## 2024-12-19 DIAGNOSIS — E66.3 OVERWEIGHT (BMI 25.0-29.9): Chronic | ICD-10-CM

## 2024-12-19 DIAGNOSIS — D62 ACUTE BLOOD LOSS ANEMIA: ICD-10-CM

## 2024-12-19 DIAGNOSIS — J01.10 ACUTE NON-RECURRENT FRONTAL SINUSITIS: ICD-10-CM

## 2024-12-19 DIAGNOSIS — D62 ACUTE BLOOD LOSS ANEMIA: Primary | ICD-10-CM

## 2024-12-19 LAB
DEPRECATED RDW RBC AUTO: 44.3 FL (ref 37–54)
ERYTHROCYTE [DISTWIDTH] IN BLOOD BY AUTOMATED COUNT: 13 % (ref 12.3–15.4)
HCT VFR BLD AUTO: 28.3 % (ref 34–46.6)
HGB BLD-MCNC: 9.1 G/DL (ref 12–15.9)
MCH RBC QN AUTO: 30.6 PG (ref 26.6–33)
MCHC RBC AUTO-ENTMCNC: 32.2 G/DL (ref 31.5–35.7)
MCV RBC AUTO: 95.3 FL (ref 79–97)
PLATELET # BLD AUTO: 412 10*3/MM3 (ref 140–450)
PMV BLD AUTO: 9.7 FL (ref 6–12)
RBC # BLD AUTO: 2.97 10*6/MM3 (ref 3.77–5.28)
WBC NRBC COR # BLD AUTO: 5.59 10*3/MM3 (ref 3.4–10.8)

## 2024-12-19 PROCEDURE — 36415 COLL VENOUS BLD VENIPUNCTURE: CPT

## 2024-12-19 PROCEDURE — 85027 COMPLETE CBC AUTOMATED: CPT

## 2024-12-19 PROCEDURE — 99214 OFFICE O/P EST MOD 30 MIN: CPT | Performed by: FAMILY MEDICINE

## 2024-12-19 RX ORDER — DOXYCYCLINE 100 MG/1
100 CAPSULE ORAL 2 TIMES DAILY
Qty: 14 CAPSULE | Refills: 0 | Status: SHIPPED | OUTPATIENT
Start: 2024-12-19

## 2025-04-03 DIAGNOSIS — K21.9 CHRONIC GERD: ICD-10-CM

## 2025-04-03 RX ORDER — PANTOPRAZOLE SODIUM 40 MG/1
TABLET, DELAYED RELEASE ORAL
Qty: 180 TABLET | Refills: 3 | Status: SHIPPED | OUTPATIENT
Start: 2025-04-03

## 2025-06-09 RX ORDER — CYCLOBENZAPRINE HCL 10 MG
TABLET ORAL
Qty: 270 TABLET | Refills: 3 | Status: SHIPPED | OUTPATIENT
Start: 2025-06-09

## (undated) DEVICE — SOL IRRG H2O PL/BG 1000ML STRL

## (undated) DEVICE — Device: Brand: DEFENDO AIR/WATER/SUCTION AND BIOPSY VALVE

## (undated) DEVICE — CONN JET HYDRA H20 AUXILIARY DISP

## (undated) DEVICE — LINER SURG CANSTR SXN S/RIGD 1500CC

## (undated) DEVICE — COLON KIT: Brand: MEDLINE INDUSTRIES, INC.

## (undated) DEVICE — SINGLE-USE BIOPSY FORCEPS: Brand: RADIAL JAW 4

## (undated) DEVICE — BLCK/BITE BLOX WO/DENTL/RIM W/STRAP 54F

## (undated) DEVICE — SOLIDIFIER LIQLOC PLS 1500CC BT

## (undated) DEVICE — EGD OR ERCP KIT: Brand: MEDLINE INDUSTRIES, INC.

## (undated) DEVICE — Device